# Patient Record
Sex: FEMALE | Race: BLACK OR AFRICAN AMERICAN | Employment: UNEMPLOYED | URBAN - METROPOLITAN AREA
[De-identification: names, ages, dates, MRNs, and addresses within clinical notes are randomized per-mention and may not be internally consistent; named-entity substitution may affect disease eponyms.]

---

## 2022-01-01 ENCOUNTER — APPOINTMENT (OUTPATIENT)
Dept: GENERAL RADIOLOGY | Age: 47
DRG: 720 | End: 2022-01-01
Payer: MEDICARE

## 2022-01-01 ENCOUNTER — APPOINTMENT (OUTPATIENT)
Dept: NUCLEAR MEDICINE | Age: 47
DRG: 720 | End: 2022-01-01
Payer: MEDICARE

## 2022-01-01 ENCOUNTER — APPOINTMENT (OUTPATIENT)
Dept: CT IMAGING | Age: 47
DRG: 720 | End: 2022-01-01
Payer: MEDICARE

## 2022-01-01 ENCOUNTER — APPOINTMENT (OUTPATIENT)
Dept: MRI IMAGING | Age: 47
DRG: 720 | End: 2022-01-01
Payer: MEDICARE

## 2022-01-01 ENCOUNTER — HOSPITAL ENCOUNTER (INPATIENT)
Age: 47
LOS: 10 days | DRG: 720 | End: 2022-11-29
Attending: EMERGENCY MEDICINE | Admitting: STUDENT IN AN ORGANIZED HEALTH CARE EDUCATION/TRAINING PROGRAM
Payer: MEDICARE

## 2022-01-01 VITALS
DIASTOLIC BLOOD PRESSURE: 56 MMHG | WEIGHT: 214.73 LBS | HEART RATE: 67 BPM | BODY MASS INDEX: 38.05 KG/M2 | SYSTOLIC BLOOD PRESSURE: 111 MMHG | HEIGHT: 63 IN | RESPIRATION RATE: 20 BRPM | TEMPERATURE: 97.5 F | OXYGEN SATURATION: 69 %

## 2022-01-01 DIAGNOSIS — R94.31 ABNORMAL EKG: ICD-10-CM

## 2022-01-01 DIAGNOSIS — A41.9 SEPTICEMIA (HCC): ICD-10-CM

## 2022-01-01 DIAGNOSIS — G93.1 ANOXIA OF BRAIN (HCC): Primary | ICD-10-CM

## 2022-01-01 DIAGNOSIS — J69.0 ASPIRATION PNEUMONIA, UNSPECIFIED ASPIRATION PNEUMONIA TYPE, UNSPECIFIED LATERALITY, UNSPECIFIED PART OF LUNG (HCC): ICD-10-CM

## 2022-01-01 DIAGNOSIS — A41.9 SEPTIC SHOCK (HCC): ICD-10-CM

## 2022-01-01 DIAGNOSIS — I46.9 CARDIAC ARREST (HCC): ICD-10-CM

## 2022-01-01 DIAGNOSIS — R65.21 SEPTIC SHOCK (HCC): ICD-10-CM

## 2022-01-01 LAB
A/G RATIO: 1.2 (ref 1.1–2.2)
ALBUMIN SERPL-MCNC: 3 G/DL (ref 3.4–5)
ALBUMIN SERPL-MCNC: 3.3 G/DL (ref 3.4–5)
ALP BLD-CCNC: 79 U/L (ref 40–129)
ALP BLD-CCNC: 79 U/L (ref 40–129)
ALT SERPL-CCNC: 17 U/L (ref 10–40)
ALT SERPL-CCNC: 23 U/L (ref 10–40)
AMPHETAMINE SCREEN, URINE: NORMAL
ANION GAP SERPL CALCULATED.3IONS-SCNC: 10 MMOL/L (ref 3–16)
ANION GAP SERPL CALCULATED.3IONS-SCNC: 10 MMOL/L (ref 3–16)
ANION GAP SERPL CALCULATED.3IONS-SCNC: 11 MMOL/L (ref 3–16)
ANION GAP SERPL CALCULATED.3IONS-SCNC: 12 MMOL/L (ref 3–16)
ANION GAP SERPL CALCULATED.3IONS-SCNC: 12 MMOL/L (ref 3–16)
ANION GAP SERPL CALCULATED.3IONS-SCNC: 13 MMOL/L (ref 3–16)
ANION GAP SERPL CALCULATED.3IONS-SCNC: 13 MMOL/L (ref 3–16)
ANION GAP SERPL CALCULATED.3IONS-SCNC: 14 MMOL/L (ref 3–16)
ANION GAP SERPL CALCULATED.3IONS-SCNC: 14 MMOL/L (ref 3–16)
ANION GAP SERPL CALCULATED.3IONS-SCNC: 22 MMOL/L (ref 3–16)
ANION GAP SERPL CALCULATED.3IONS-SCNC: 7 MMOL/L (ref 3–16)
ANISOCYTOSIS: ABNORMAL
APTT: 104.9 SEC (ref 23–34.3)
APTT: 112.1 SEC (ref 23–34.3)
APTT: 71.4 SEC (ref 23–34.3)
APTT: 72.8 SEC (ref 23–34.3)
AST SERPL-CCNC: 22 U/L (ref 15–37)
AST SERPL-CCNC: 34 U/L (ref 15–37)
BACTERIA: ABNORMAL /HPF
BANDED NEUTROPHILS RELATIVE PERCENT: 2 % (ref 0–7)
BANDED NEUTROPHILS RELATIVE PERCENT: 2 % (ref 0–7)
BANDED NEUTROPHILS RELATIVE PERCENT: 5 % (ref 0–7)
BANDED NEUTROPHILS RELATIVE PERCENT: 6 % (ref 0–7)
BARBITURATE SCREEN URINE: NORMAL
BASE EXCESS ARTERIAL: -10.8 MMOL/L (ref -3–3)
BASE EXCESS ARTERIAL: -11.5 MMOL/L (ref -3–3)
BASE EXCESS ARTERIAL: -12 MMOL/L (ref -3–3)
BASE EXCESS ARTERIAL: -2.4 MMOL/L (ref -3–3)
BASE EXCESS ARTERIAL: -5.1 MMOL/L (ref -3–3)
BASE EXCESS ARTERIAL: 0 MMOL/L (ref -3–3)
BASE EXCESS ARTERIAL: 10.4 MMOL/L (ref -3–3)
BASE EXCESS ARTERIAL: 10.7 MMOL/L (ref -3–3)
BASE EXCESS ARTERIAL: 11.3 MMOL/L (ref -3–3)
BASE EXCESS ARTERIAL: 2 (ref -3–3)
BASE EXCESS ARTERIAL: 2.9 MMOL/L (ref -3–3)
BASE EXCESS ARTERIAL: 3 (ref -3–3)
BASE EXCESS ARTERIAL: 5.7 MMOL/L (ref -3–3)
BASE EXCESS ARTERIAL: 5.8 MMOL/L (ref -3–3)
BASE EXCESS ARTERIAL: 5.8 MMOL/L (ref -3–3)
BASE EXCESS ARTERIAL: 6.3 MMOL/L (ref -3–3)
BASE EXCESS ARTERIAL: 6.4 MMOL/L (ref -3–3)
BASE EXCESS ARTERIAL: 7 MMOL/L (ref -3–3)
BASE EXCESS ARTERIAL: 8 (ref -3–3)
BASE EXCESS ARTERIAL: 8.1 MMOL/L (ref -3–3)
BASE EXCESS ARTERIAL: 8.1 MMOL/L (ref -3–3)
BASE EXCESS ARTERIAL: 8.4 MMOL/L (ref -3–3)
BASOPHILS ABSOLUTE: 0 K/UL (ref 0–0.2)
BASOPHILS ABSOLUTE: 0.1 K/UL (ref 0–0.2)
BASOPHILS ABSOLUTE: 0.1 K/UL (ref 0–0.2)
BASOPHILS RELATIVE PERCENT: 0 %
BASOPHILS RELATIVE PERCENT: 0.1 %
BASOPHILS RELATIVE PERCENT: 0.3 %
BASOPHILS RELATIVE PERCENT: 0.3 %
BASOPHILS RELATIVE PERCENT: 0.4 %
BASOPHILS RELATIVE PERCENT: 0.6 %
BASOPHILS RELATIVE PERCENT: 1.1 %
BENZODIAZEPINE SCREEN, URINE: NORMAL
BILIRUB SERPL-MCNC: 0.5 MG/DL (ref 0–1)
BILIRUB SERPL-MCNC: <0.2 MG/DL (ref 0–1)
BILIRUBIN DIRECT: <0.2 MG/DL (ref 0–0.3)
BILIRUBIN URINE: NEGATIVE
BILIRUBIN, INDIRECT: ABNORMAL MG/DL (ref 0–1)
BLOOD CULTURE, ROUTINE: NORMAL
BLOOD CULTURE, ROUTINE: NORMAL
BLOOD, URINE: ABNORMAL
BUN BLDV-MCNC: 10 MG/DL (ref 7–20)
BUN BLDV-MCNC: 13 MG/DL (ref 7–20)
BUN BLDV-MCNC: 14 MG/DL (ref 7–20)
BUN BLDV-MCNC: 15 MG/DL (ref 7–20)
BUN BLDV-MCNC: 15 MG/DL (ref 7–20)
BUN BLDV-MCNC: 17 MG/DL (ref 7–20)
BUN BLDV-MCNC: 18 MG/DL (ref 7–20)
BUN BLDV-MCNC: 18 MG/DL (ref 7–20)
BUN BLDV-MCNC: 20 MG/DL (ref 7–20)
BUN BLDV-MCNC: 22 MG/DL (ref 7–20)
BUN BLDV-MCNC: 23 MG/DL (ref 7–20)
BUN BLDV-MCNC: 23 MG/DL (ref 7–20)
BUN BLDV-MCNC: 30 MG/DL (ref 7–20)
BUN BLDV-MCNC: 8 MG/DL (ref 7–20)
BUN BLDV-MCNC: 9 MG/DL (ref 7–20)
CALCIUM IONIZED: 0.99 MMOL/L (ref 1.12–1.32)
CALCIUM IONIZED: 1.01 MMOL/L (ref 1.12–1.32)
CALCIUM IONIZED: 1.01 MMOL/L (ref 1.12–1.32)
CALCIUM IONIZED: 1.03 MMOL/L (ref 1.12–1.32)
CALCIUM IONIZED: 1.05 MMOL/L (ref 1.12–1.32)
CALCIUM IONIZED: 1.05 MMOL/L (ref 1.12–1.32)
CALCIUM IONIZED: 1.06 MMOL/L (ref 1.12–1.32)
CALCIUM IONIZED: 1.07 MMOL/L (ref 1.12–1.32)
CALCIUM IONIZED: 1.09 MMOL/L (ref 1.12–1.32)
CALCIUM SERPL-MCNC: 7 MG/DL (ref 8.3–10.6)
CALCIUM SERPL-MCNC: 7 MG/DL (ref 8.3–10.6)
CALCIUM SERPL-MCNC: 7.1 MG/DL (ref 8.3–10.6)
CALCIUM SERPL-MCNC: 7.2 MG/DL (ref 8.3–10.6)
CALCIUM SERPL-MCNC: 7.2 MG/DL (ref 8.3–10.6)
CALCIUM SERPL-MCNC: 7.4 MG/DL (ref 8.3–10.6)
CALCIUM SERPL-MCNC: 7.7 MG/DL (ref 8.3–10.6)
CALCIUM SERPL-MCNC: 7.9 MG/DL (ref 8.3–10.6)
CALCIUM SERPL-MCNC: 7.9 MG/DL (ref 8.3–10.6)
CALCIUM SERPL-MCNC: 8.5 MG/DL (ref 8.3–10.6)
CALCIUM SERPL-MCNC: 8.6 MG/DL (ref 8.3–10.6)
CALCIUM SERPL-MCNC: 8.8 MG/DL (ref 8.3–10.6)
CANNABINOID SCREEN URINE: NORMAL
CARBOXYHEMOGLOBIN ARTERIAL: 0.4 % (ref 0–1.5)
CARBOXYHEMOGLOBIN ARTERIAL: 0.4 % (ref 0–1.5)
CARBOXYHEMOGLOBIN ARTERIAL: 0.5 % (ref 0–1.5)
CARBOXYHEMOGLOBIN ARTERIAL: 0.6 % (ref 0–1.5)
CARBOXYHEMOGLOBIN ARTERIAL: 0.7 % (ref 0–1.5)
CARBOXYHEMOGLOBIN ARTERIAL: 0.7 % (ref 0–1.5)
CARBOXYHEMOGLOBIN ARTERIAL: 0.8 % (ref 0–1.5)
CARBOXYHEMOGLOBIN ARTERIAL: 1 % (ref 0–1.5)
CARBOXYHEMOGLOBIN ARTERIAL: 1.1 % (ref 0–1.5)
CARBOXYHEMOGLOBIN ARTERIAL: 1.2 % (ref 0–1.5)
CARBOXYHEMOGLOBIN ARTERIAL: 1.3 % (ref 0–1.5)
CARBOXYHEMOGLOBIN ARTERIAL: 1.3 % (ref 0–1.5)
CARBOXYHEMOGLOBIN ARTERIAL: 1.4 % (ref 0–1.5)
CARBOXYHEMOGLOBIN ARTERIAL: 1.4 % (ref 0–1.5)
CARBOXYHEMOGLOBIN ARTERIAL: 1.5 % (ref 0–1.5)
CARBOXYHEMOGLOBIN ARTERIAL: 1.9 % (ref 0–1.5)
CHLORIDE BLD-SCNC: 103 MMOL/L (ref 99–110)
CHLORIDE BLD-SCNC: 104 MMOL/L (ref 99–110)
CHLORIDE BLD-SCNC: 104 MMOL/L (ref 99–110)
CHLORIDE BLD-SCNC: 105 MMOL/L (ref 99–110)
CHLORIDE BLD-SCNC: 107 MMOL/L (ref 99–110)
CHLORIDE BLD-SCNC: 109 MMOL/L (ref 99–110)
CHLORIDE BLD-SCNC: 109 MMOL/L (ref 99–110)
CHLORIDE BLD-SCNC: 111 MMOL/L (ref 99–110)
CHLORIDE BLD-SCNC: 112 MMOL/L (ref 99–110)
CHLORIDE BLD-SCNC: 113 MMOL/L (ref 99–110)
CHLORIDE BLD-SCNC: 118 MMOL/L (ref 99–110)
CHLORIDE BLD-SCNC: 119 MMOL/L (ref 99–110)
CHLORIDE BLD-SCNC: 119 MMOL/L (ref 99–110)
CHLORIDE BLD-SCNC: 121 MMOL/L (ref 99–110)
CHLORIDE BLD-SCNC: 122 MMOL/L (ref 99–110)
CHLORIDE BLD-SCNC: 123 MMOL/L (ref 99–110)
CHLORIDE BLD-SCNC: 124 MMOL/L (ref 99–110)
CLARITY: ABNORMAL
CO2: 16 MMOL/L (ref 21–32)
CO2: 17 MMOL/L (ref 21–32)
CO2: 17 MMOL/L (ref 21–32)
CO2: 20 MMOL/L (ref 21–32)
CO2: 21 MMOL/L (ref 21–32)
CO2: 22 MMOL/L (ref 21–32)
CO2: 24 MMOL/L (ref 21–32)
CO2: 25 MMOL/L (ref 21–32)
CO2: 26 MMOL/L (ref 21–32)
CO2: 27 MMOL/L (ref 21–32)
CO2: 27 MMOL/L (ref 21–32)
CO2: 28 MMOL/L (ref 21–32)
CO2: 29 MMOL/L (ref 21–32)
CO2: 30 MMOL/L (ref 21–32)
CO2: 31 MMOL/L (ref 21–32)
CO2: 31 MMOL/L (ref 21–32)
CO2: 33 MMOL/L (ref 21–32)
COCAINE METABOLITE SCREEN URINE: NORMAL
COLOR: YELLOW
COMMENT UA: ABNORMAL
CREAT SERPL-MCNC: 0.5 MG/DL (ref 0.6–1.1)
CREAT SERPL-MCNC: 0.6 MG/DL (ref 0.6–1.1)
CREAT SERPL-MCNC: 0.7 MG/DL (ref 0.6–1.1)
CREAT SERPL-MCNC: 0.8 MG/DL (ref 0.6–1.1)
CREAT SERPL-MCNC: 0.9 MG/DL (ref 0.6–1.1)
CREAT SERPL-MCNC: <0.5 MG/DL (ref 0.6–1.1)
CREAT SERPL-MCNC: <0.5 MG/DL (ref 0.6–1.1)
CRYSTALS, UA: ABNORMAL /HPF
CULTURE, RESPIRATORY: NORMAL
EKG ATRIAL RATE: 159 BPM
EKG ATRIAL RATE: 84 BPM
EKG ATRIAL RATE: 93 BPM
EKG DIAGNOSIS: NORMAL
EKG P AXIS: 35 DEGREES
EKG P AXIS: 54 DEGREES
EKG P AXIS: 59 DEGREES
EKG P-R INTERVAL: 114 MS
EKG P-R INTERVAL: 134 MS
EKG P-R INTERVAL: 180 MS
EKG Q-T INTERVAL: 314 MS
EKG Q-T INTERVAL: 386 MS
EKG Q-T INTERVAL: 448 MS
EKG QRS DURATION: 86 MS
EKG QRS DURATION: 88 MS
EKG QRS DURATION: 92 MS
EKG QTC CALCULATION (BAZETT): 479 MS
EKG QTC CALCULATION (BAZETT): 497 MS
EKG QTC CALCULATION (BAZETT): 529 MS
EKG R AXIS: 57 DEGREES
EKG R AXIS: 64 DEGREES
EKG R AXIS: 75 DEGREES
EKG T AXIS: -32 DEGREES
EKG T AXIS: -82 DEGREES
EKG T AXIS: 37 DEGREES
EKG VENTRICULAR RATE: 151 BPM
EKG VENTRICULAR RATE: 84 BPM
EKG VENTRICULAR RATE: 93 BPM
EOSINOPHILS ABSOLUTE: 0 K/UL (ref 0–0.6)
EOSINOPHILS ABSOLUTE: 0.1 K/UL (ref 0–0.6)
EOSINOPHILS ABSOLUTE: 0.2 K/UL (ref 0–0.6)
EOSINOPHILS ABSOLUTE: 0.2 K/UL (ref 0–0.6)
EOSINOPHILS ABSOLUTE: 0.3 K/UL (ref 0–0.6)
EOSINOPHILS RELATIVE PERCENT: 0 %
EOSINOPHILS RELATIVE PERCENT: 0.2 %
EOSINOPHILS RELATIVE PERCENT: 1.9 %
EOSINOPHILS RELATIVE PERCENT: 2 %
EOSINOPHILS RELATIVE PERCENT: 2.1 %
EOSINOPHILS RELATIVE PERCENT: 2.3 %
EPITHELIAL CELLS, UA: 13 /HPF (ref 0–5)
ESTIMATED AVERAGE GLUCOSE: 125.5 MG/DL
FENTANYL SCREEN, URINE: NORMAL
FUNGUS (MYCOLOGY) CULTURE: NORMAL
FUNGUS STAIN: NORMAL
GFR SERPL CREATININE-BSD FRML MDRD: >60 ML/MIN/{1.73_M2}
GLUCOSE BLD-MCNC: 114 MG/DL (ref 70–99)
GLUCOSE BLD-MCNC: 124 MG/DL (ref 70–99)
GLUCOSE BLD-MCNC: 125 MG/DL (ref 70–99)
GLUCOSE BLD-MCNC: 129 MG/DL (ref 70–99)
GLUCOSE BLD-MCNC: 131 MG/DL (ref 70–99)
GLUCOSE BLD-MCNC: 132 MG/DL (ref 70–99)
GLUCOSE BLD-MCNC: 132 MG/DL (ref 70–99)
GLUCOSE BLD-MCNC: 135 MG/DL (ref 70–99)
GLUCOSE BLD-MCNC: 136 MG/DL (ref 70–99)
GLUCOSE BLD-MCNC: 136 MG/DL (ref 70–99)
GLUCOSE BLD-MCNC: 138 MG/DL (ref 70–99)
GLUCOSE BLD-MCNC: 138 MG/DL (ref 70–99)
GLUCOSE BLD-MCNC: 139 MG/DL (ref 70–99)
GLUCOSE BLD-MCNC: 139 MG/DL (ref 70–99)
GLUCOSE BLD-MCNC: 140 MG/DL (ref 70–99)
GLUCOSE BLD-MCNC: 142 MG/DL (ref 70–99)
GLUCOSE BLD-MCNC: 144 MG/DL (ref 70–99)
GLUCOSE BLD-MCNC: 145 MG/DL (ref 70–99)
GLUCOSE BLD-MCNC: 146 MG/DL (ref 70–99)
GLUCOSE BLD-MCNC: 147 MG/DL (ref 70–99)
GLUCOSE BLD-MCNC: 151 MG/DL (ref 70–99)
GLUCOSE BLD-MCNC: 152 MG/DL (ref 70–99)
GLUCOSE BLD-MCNC: 152 MG/DL (ref 70–99)
GLUCOSE BLD-MCNC: 153 MG/DL (ref 70–99)
GLUCOSE BLD-MCNC: 154 MG/DL (ref 70–99)
GLUCOSE BLD-MCNC: 155 MG/DL (ref 70–99)
GLUCOSE BLD-MCNC: 156 MG/DL (ref 70–99)
GLUCOSE BLD-MCNC: 157 MG/DL (ref 70–99)
GLUCOSE BLD-MCNC: 158 MG/DL (ref 70–99)
GLUCOSE BLD-MCNC: 158 MG/DL (ref 70–99)
GLUCOSE BLD-MCNC: 159 MG/DL (ref 70–99)
GLUCOSE BLD-MCNC: 159 MG/DL (ref 70–99)
GLUCOSE BLD-MCNC: 160 MG/DL (ref 70–99)
GLUCOSE BLD-MCNC: 161 MG/DL (ref 70–99)
GLUCOSE BLD-MCNC: 162 MG/DL (ref 70–99)
GLUCOSE BLD-MCNC: 164 MG/DL (ref 70–99)
GLUCOSE BLD-MCNC: 169 MG/DL (ref 70–99)
GLUCOSE BLD-MCNC: 170 MG/DL (ref 70–99)
GLUCOSE BLD-MCNC: 172 MG/DL (ref 70–99)
GLUCOSE BLD-MCNC: 173 MG/DL (ref 70–99)
GLUCOSE BLD-MCNC: 174 MG/DL (ref 70–99)
GLUCOSE BLD-MCNC: 177 MG/DL (ref 70–99)
GLUCOSE BLD-MCNC: 181 MG/DL (ref 70–99)
GLUCOSE BLD-MCNC: 187 MG/DL (ref 70–99)
GLUCOSE BLD-MCNC: 189 MG/DL (ref 70–99)
GLUCOSE BLD-MCNC: 191 MG/DL (ref 70–99)
GLUCOSE BLD-MCNC: 191 MG/DL (ref 70–99)
GLUCOSE BLD-MCNC: 193 MG/DL (ref 70–99)
GLUCOSE BLD-MCNC: 193 MG/DL (ref 70–99)
GLUCOSE BLD-MCNC: 194 MG/DL (ref 70–99)
GLUCOSE BLD-MCNC: 196 MG/DL (ref 70–99)
GLUCOSE BLD-MCNC: 197 MG/DL (ref 70–99)
GLUCOSE BLD-MCNC: 198 MG/DL (ref 70–99)
GLUCOSE BLD-MCNC: 199 MG/DL (ref 70–99)
GLUCOSE BLD-MCNC: 200 MG/DL (ref 70–99)
GLUCOSE BLD-MCNC: 207 MG/DL (ref 70–99)
GLUCOSE BLD-MCNC: 208 MG/DL (ref 70–99)
GLUCOSE BLD-MCNC: 213 MG/DL (ref 70–99)
GLUCOSE BLD-MCNC: 214 MG/DL (ref 70–99)
GLUCOSE BLD-MCNC: 216 MG/DL (ref 70–99)
GLUCOSE BLD-MCNC: 220 MG/DL (ref 70–99)
GLUCOSE BLD-MCNC: 227 MG/DL (ref 70–99)
GLUCOSE BLD-MCNC: 227 MG/DL (ref 70–99)
GLUCOSE BLD-MCNC: 230 MG/DL (ref 70–99)
GLUCOSE BLD-MCNC: 231 MG/DL (ref 70–99)
GLUCOSE BLD-MCNC: 231 MG/DL (ref 70–99)
GLUCOSE BLD-MCNC: 232 MG/DL (ref 70–99)
GLUCOSE BLD-MCNC: 233 MG/DL (ref 70–99)
GLUCOSE BLD-MCNC: 234 MG/DL (ref 70–99)
GLUCOSE BLD-MCNC: 236 MG/DL (ref 70–99)
GLUCOSE BLD-MCNC: 237 MG/DL (ref 70–99)
GLUCOSE BLD-MCNC: 240 MG/DL (ref 70–99)
GLUCOSE BLD-MCNC: 243 MG/DL (ref 70–99)
GLUCOSE BLD-MCNC: 247 MG/DL (ref 70–99)
GLUCOSE BLD-MCNC: 252 MG/DL (ref 70–99)
GLUCOSE BLD-MCNC: 256 MG/DL (ref 70–99)
GLUCOSE BLD-MCNC: 260 MG/DL (ref 70–99)
GLUCOSE BLD-MCNC: 261 MG/DL (ref 70–99)
GLUCOSE BLD-MCNC: 261 MG/DL (ref 70–99)
GLUCOSE BLD-MCNC: 262 MG/DL (ref 70–99)
GLUCOSE BLD-MCNC: 263 MG/DL (ref 70–99)
GLUCOSE BLD-MCNC: 265 MG/DL (ref 70–99)
GLUCOSE BLD-MCNC: 268 MG/DL (ref 70–99)
GLUCOSE BLD-MCNC: 270 MG/DL (ref 70–99)
GLUCOSE BLD-MCNC: 275 MG/DL (ref 70–99)
GLUCOSE BLD-MCNC: 280 MG/DL (ref 70–99)
GLUCOSE BLD-MCNC: 286 MG/DL (ref 70–99)
GLUCOSE BLD-MCNC: 290 MG/DL (ref 70–99)
GLUCOSE BLD-MCNC: 304 MG/DL (ref 70–99)
GLUCOSE BLD-MCNC: 307 MG/DL (ref 70–99)
GLUCOSE BLD-MCNC: 308 MG/DL (ref 70–99)
GLUCOSE BLD-MCNC: 313 MG/DL (ref 70–99)
GLUCOSE BLD-MCNC: 320 MG/DL (ref 70–99)
GLUCOSE BLD-MCNC: 322 MG/DL (ref 70–99)
GLUCOSE BLD-MCNC: 331 MG/DL (ref 70–99)
GLUCOSE BLD-MCNC: 333 MG/DL (ref 70–99)
GLUCOSE BLD-MCNC: 336 MG/DL (ref 70–99)
GLUCOSE BLD-MCNC: 338 MG/DL (ref 70–99)
GLUCOSE BLD-MCNC: 339 MG/DL (ref 70–99)
GLUCOSE BLD-MCNC: 344 MG/DL (ref 70–99)
GLUCOSE BLD-MCNC: 350 MG/DL (ref 70–99)
GLUCOSE BLD-MCNC: 351 MG/DL (ref 70–99)
GLUCOSE BLD-MCNC: 356 MG/DL (ref 70–99)
GLUCOSE BLD-MCNC: 374 MG/DL (ref 70–99)
GLUCOSE BLD-MCNC: 378 MG/DL (ref 70–99)
GLUCOSE URINE: NEGATIVE MG/DL
GRAM STAIN RESULT: NORMAL
HBA1C MFR BLD: 6 %
HCO3 ARTERIAL: 18.1 MMOL/L (ref 21–29)
HCO3 ARTERIAL: 18.5 MMOL/L (ref 21–29)
HCO3 ARTERIAL: 18.7 MMOL/L (ref 21–29)
HCO3 ARTERIAL: 24.3 MMOL/L (ref 21–29)
HCO3 ARTERIAL: 26.4 MMOL/L (ref 21–29)
HCO3 ARTERIAL: 27.9 MMOL/L (ref 21–29)
HCO3 ARTERIAL: 29.6 MMOL/L (ref 21–29)
HCO3 ARTERIAL: 29.6 MMOL/L (ref 21–29)
HCO3 ARTERIAL: 30 MMOL/L (ref 21–29)
HCO3 ARTERIAL: 31 MMOL/L (ref 21–29)
HCO3 ARTERIAL: 31.1 MMOL/L (ref 21–29)
HCO3 ARTERIAL: 31.8 MMOL/L (ref 21–29)
HCO3 ARTERIAL: 32 MMOL/L (ref 21–29)
HCO3 ARTERIAL: 32.7 MMOL/L (ref 21–29)
HCO3 ARTERIAL: 33 MMOL/L (ref 21–29)
HCO3 ARTERIAL: 33.1 MMOL/L (ref 21–29)
HCO3 ARTERIAL: 33.3 MMOL/L (ref 21–29)
HCO3 ARTERIAL: 33.5 MMOL/L (ref 21–29)
HCO3 ARTERIAL: 33.8 MMOL/L (ref 21–29)
HCO3 ARTERIAL: 34 MMOL/L (ref 21–29)
HCO3 ARTERIAL: 34.2 MMOL/L (ref 21–29)
HCO3 ARTERIAL: 34.8 MMOL/L (ref 21–29)
HCO3 ARTERIAL: 35.2 MMOL/L (ref 21–29)
HCO3 ARTERIAL: 36 MMOL/L (ref 21–29)
HCT VFR BLD CALC: 24 % (ref 36–48)
HCT VFR BLD CALC: 25.2 % (ref 36–48)
HCT VFR BLD CALC: 26.3 % (ref 36–48)
HCT VFR BLD CALC: 27.4 % (ref 36–48)
HCT VFR BLD CALC: 27.6 % (ref 36–48)
HCT VFR BLD CALC: 27.9 % (ref 36–48)
HCT VFR BLD CALC: 30.8 % (ref 36–48)
HCT VFR BLD CALC: 32.9 % (ref 36–48)
HCT VFR BLD CALC: 33 % (ref 36–48)
HCT VFR BLD CALC: 39.8 % (ref 36–48)
HCT VFR BLD CALC: 40.9 % (ref 36–48)
HEMATOLOGY PATH CONSULT: NO
HEMOGLOBIN, ART, EXTENDED: 10 G/DL (ref 12–16)
HEMOGLOBIN, ART, EXTENDED: 10.2 G/DL (ref 12–16)
HEMOGLOBIN, ART, EXTENDED: 10.3 G/DL (ref 12–16)
HEMOGLOBIN, ART, EXTENDED: 10.7 G/DL (ref 12–16)
HEMOGLOBIN, ART, EXTENDED: 11.1 G/DL (ref 12–16)
HEMOGLOBIN, ART, EXTENDED: 12.3 G/DL (ref 12–16)
HEMOGLOBIN, ART, EXTENDED: 12.4 G/DL (ref 12–16)
HEMOGLOBIN, ART, EXTENDED: 12.7 G/DL (ref 12–16)
HEMOGLOBIN, ART, EXTENDED: 12.8 G/DL (ref 12–16)
HEMOGLOBIN, ART, EXTENDED: 12.9 G/DL (ref 12–16)
HEMOGLOBIN, ART, EXTENDED: 12.9 G/DL (ref 12–16)
HEMOGLOBIN, ART, EXTENDED: 13.2 G/DL (ref 12–16)
HEMOGLOBIN, ART, EXTENDED: 13.5 G/DL (ref 12–16)
HEMOGLOBIN, ART, EXTENDED: 14.5 G/DL (ref 12–16)
HEMOGLOBIN, ART, EXTENDED: 16.8 G/DL (ref 12–16)
HEMOGLOBIN, ART, EXTENDED: 8.9 G/DL (ref 12–16)
HEMOGLOBIN, ART, EXTENDED: 9.9 G/DL (ref 12–16)
HEMOGLOBIN: 10.4 G/DL (ref 12–16)
HEMOGLOBIN: 10.4 G/DL (ref 12–16)
HEMOGLOBIN: 12.2 G/DL (ref 12–16)
HEMOGLOBIN: 12.5 G/DL (ref 12–16)
HEMOGLOBIN: 7.3 G/DL (ref 12–16)
HEMOGLOBIN: 7.5 G/DL (ref 12–16)
HEMOGLOBIN: 7.9 G/DL (ref 12–16)
HEMOGLOBIN: 8.5 G/DL (ref 12–16)
HEMOGLOBIN: 8.5 G/DL (ref 12–16)
HEMOGLOBIN: 9.1 G/DL (ref 12–16)
HEMOGLOBIN: 9.8 G/DL (ref 12–16)
HYALINE CASTS: 4 /LPF (ref 0–8)
HYPOCHROMIA: ABNORMAL
INR BLD: 1.39 (ref 0.87–1.14)
KETONES, URINE: 15 MG/DL
LACTIC ACID: 1.5 MMOL/L (ref 0.4–2)
LACTIC ACID: 1.7 MMOL/L (ref 0.4–2)
LACTIC ACID: 1.7 MMOL/L (ref 0.4–2)
LACTIC ACID: 2 MMOL/L (ref 0.4–2)
LACTIC ACID: 2.1 MMOL/L (ref 0.4–2)
LACTIC ACID: 2.2 MMOL/L (ref 0.4–2)
LACTIC ACID: 2.5 MMOL/L (ref 0.4–2)
LACTIC ACID: 2.6 MMOL/L (ref 0.4–2)
LACTIC ACID: 2.9 MMOL/L (ref 0.4–2)
LACTIC ACID: 3.2 MMOL/L (ref 0.4–2)
LACTIC ACID: 7.3 MMOL/L (ref 0.4–2)
LEUKOCYTE ESTERASE, URINE: NEGATIVE
LIPASE: 14 U/L (ref 13–60)
LV EF: 55 %
LVEF MODALITY: NORMAL
LYMPHOCYTES ABSOLUTE: 0.4 K/UL (ref 1–5.1)
LYMPHOCYTES ABSOLUTE: 0.4 K/UL (ref 1–5.1)
LYMPHOCYTES ABSOLUTE: 0.5 K/UL (ref 1–5.1)
LYMPHOCYTES ABSOLUTE: 0.6 K/UL (ref 1–5.1)
LYMPHOCYTES ABSOLUTE: 0.7 K/UL (ref 1–5.1)
LYMPHOCYTES ABSOLUTE: 0.9 K/UL (ref 1–5.1)
LYMPHOCYTES ABSOLUTE: 1.1 K/UL (ref 1–5.1)
LYMPHOCYTES ABSOLUTE: 1.3 K/UL (ref 1–5.1)
LYMPHOCYTES ABSOLUTE: 3.6 K/UL (ref 1–5.1)
LYMPHOCYTES RELATIVE PERCENT: 11.9 %
LYMPHOCYTES RELATIVE PERCENT: 14.1 %
LYMPHOCYTES RELATIVE PERCENT: 18 %
LYMPHOCYTES RELATIVE PERCENT: 2.9 %
LYMPHOCYTES RELATIVE PERCENT: 25.8 %
LYMPHOCYTES RELATIVE PERCENT: 3 %
LYMPHOCYTES RELATIVE PERCENT: 3.3 %
LYMPHOCYTES RELATIVE PERCENT: 6 %
LYMPHOCYTES RELATIVE PERCENT: 7.4 %
LYMPHOCYTES RELATIVE PERCENT: 9 %
LYMPHOCYTES RELATIVE PERCENT: 9.5 %
Lab: NORMAL
MACROCYTES: ABNORMAL
MACROCYTES: ABNORMAL
MAGNESIUM: 1.8 MG/DL (ref 1.8–2.4)
MAGNESIUM: 1.9 MG/DL (ref 1.8–2.4)
MAGNESIUM: 2 MG/DL (ref 1.8–2.4)
MAGNESIUM: 2 MG/DL (ref 1.8–2.4)
MAGNESIUM: 2.2 MG/DL (ref 1.8–2.4)
MAGNESIUM: 2.3 MG/DL (ref 1.8–2.4)
MAGNESIUM: 2.3 MG/DL (ref 1.8–2.4)
MAGNESIUM: 2.4 MG/DL (ref 1.8–2.4)
MAGNESIUM: 2.7 MG/DL (ref 1.8–2.4)
MAGNESIUM: 2.9 MG/DL (ref 1.8–2.4)
MAGNESIUM: 3 MG/DL (ref 1.8–2.4)
MCH RBC QN AUTO: 23.4 PG (ref 26–34)
MCH RBC QN AUTO: 23.4 PG (ref 26–34)
MCH RBC QN AUTO: 23.8 PG (ref 26–34)
MCH RBC QN AUTO: 24 PG (ref 26–34)
MCH RBC QN AUTO: 24.2 PG (ref 26–34)
MCH RBC QN AUTO: 24.2 PG (ref 26–34)
MCH RBC QN AUTO: 24.3 PG (ref 26–34)
MCH RBC QN AUTO: 24.3 PG (ref 26–34)
MCHC RBC AUTO-ENTMCNC: 29.7 G/DL (ref 31–36)
MCHC RBC AUTO-ENTMCNC: 29.8 G/DL (ref 31–36)
MCHC RBC AUTO-ENTMCNC: 30.1 G/DL (ref 31–36)
MCHC RBC AUTO-ENTMCNC: 30.5 G/DL (ref 31–36)
MCHC RBC AUTO-ENTMCNC: 30.8 G/DL (ref 31–36)
MCHC RBC AUTO-ENTMCNC: 30.9 G/DL (ref 31–36)
MCHC RBC AUTO-ENTMCNC: 31.5 G/DL (ref 31–36)
MCHC RBC AUTO-ENTMCNC: 31.5 G/DL (ref 31–36)
MCHC RBC AUTO-ENTMCNC: 31.7 G/DL (ref 31–36)
MCHC RBC AUTO-ENTMCNC: 32 G/DL (ref 31–36)
MCHC RBC AUTO-ENTMCNC: 32.4 G/DL (ref 31–36)
MCV RBC AUTO: 74.9 FL (ref 80–100)
MCV RBC AUTO: 75 FL (ref 80–100)
MCV RBC AUTO: 76.3 FL (ref 80–100)
MCV RBC AUTO: 76.4 FL (ref 80–100)
MCV RBC AUTO: 77.2 FL (ref 80–100)
MCV RBC AUTO: 77.2 FL (ref 80–100)
MCV RBC AUTO: 77.5 FL (ref 80–100)
MCV RBC AUTO: 77.6 FL (ref 80–100)
MCV RBC AUTO: 78.4 FL (ref 80–100)
MCV RBC AUTO: 78.6 FL (ref 80–100)
MCV RBC AUTO: 81.3 FL (ref 80–100)
METAMYELOCYTES RELATIVE PERCENT: 1 %
METHADONE SCREEN, URINE: NORMAL
METHEMOGLOBIN ARTERIAL: 0.2 %
METHEMOGLOBIN ARTERIAL: 0.4 %
METHEMOGLOBIN ARTERIAL: 0.5 %
METHEMOGLOBIN ARTERIAL: 0.6 %
METHEMOGLOBIN ARTERIAL: 0.7 %
METHEMOGLOBIN ARTERIAL: 0.7 %
METHEMOGLOBIN ARTERIAL: 0.8 %
METHEMOGLOBIN ARTERIAL: 0.8 %
MICROCYTES: ABNORMAL
MICROCYTES: ABNORMAL
MICROSCOPIC EXAMINATION: YES
MONOCYTES ABSOLUTE: 0 K/UL (ref 0–1.3)
MONOCYTES ABSOLUTE: 0.2 K/UL (ref 0–1.3)
MONOCYTES ABSOLUTE: 0.2 K/UL (ref 0–1.3)
MONOCYTES ABSOLUTE: 0.3 K/UL (ref 0–1.3)
MONOCYTES ABSOLUTE: 0.4 K/UL (ref 0–1.3)
MONOCYTES ABSOLUTE: 0.5 K/UL (ref 0–1.3)
MONOCYTES ABSOLUTE: 0.7 K/UL (ref 0–1.3)
MONOCYTES ABSOLUTE: 0.8 K/UL (ref 0–1.3)
MONOCYTES RELATIVE PERCENT: 0 %
MONOCYTES RELATIVE PERCENT: 1.8 %
MONOCYTES RELATIVE PERCENT: 2.6 %
MONOCYTES RELATIVE PERCENT: 2.6 %
MONOCYTES RELATIVE PERCENT: 2.8 %
MONOCYTES RELATIVE PERCENT: 2.9 %
MONOCYTES RELATIVE PERCENT: 3 %
MONOCYTES RELATIVE PERCENT: 3.5 %
MONOCYTES RELATIVE PERCENT: 4 %
MONOCYTES RELATIVE PERCENT: 5 %
MONOCYTES RELATIVE PERCENT: 5.6 %
MRSA SCREEN RT-PCR: NORMAL
MYELOCYTE PERCENT: 1 %
NEUTROPHILS ABSOLUTE: 10 K/UL (ref 1.7–7.7)
NEUTROPHILS ABSOLUTE: 11.3 K/UL (ref 1.7–7.7)
NEUTROPHILS ABSOLUTE: 12.3 K/UL (ref 1.7–7.7)
NEUTROPHILS ABSOLUTE: 17.3 K/UL (ref 1.7–7.7)
NEUTROPHILS ABSOLUTE: 5.8 K/UL (ref 1.7–7.7)
NEUTROPHILS ABSOLUTE: 6.4 K/UL (ref 1.7–7.7)
NEUTROPHILS ABSOLUTE: 7.2 K/UL (ref 1.7–7.7)
NEUTROPHILS ABSOLUTE: 8.9 K/UL (ref 1.7–7.7)
NEUTROPHILS ABSOLUTE: 9.1 K/UL (ref 1.7–7.7)
NEUTROPHILS ABSOLUTE: 9.4 K/UL (ref 1.7–7.7)
NEUTROPHILS ABSOLUTE: 9.8 K/UL (ref 1.7–7.7)
NEUTROPHILS RELATIVE PERCENT: 67.8 %
NEUTROPHILS RELATIVE PERCENT: 74 %
NEUTROPHILS RELATIVE PERCENT: 80.8 %
NEUTROPHILS RELATIVE PERCENT: 82.9 %
NEUTROPHILS RELATIVE PERCENT: 84 %
NEUTROPHILS RELATIVE PERCENT: 84.7 %
NEUTROPHILS RELATIVE PERCENT: 85 %
NEUTROPHILS RELATIVE PERCENT: 88 %
NEUTROPHILS RELATIVE PERCENT: 89.7 %
NEUTROPHILS RELATIVE PERCENT: 93.6 %
NEUTROPHILS RELATIVE PERCENT: 94.5 %
NITRITE, URINE: NEGATIVE
NUCLEATED RED BLOOD CELLS: 1 /100 WBC
NUCLEATED RED BLOOD CELLS: 2 /100 WBC
NUCLEATED RED BLOOD CELLS: 2 /100 WBC
O2 SAT, ARTERIAL: 43 % (ref 93–100)
O2 SAT, ARTERIAL: 44 % (ref 93–100)
O2 SAT, ARTERIAL: 80.5 %
O2 SAT, ARTERIAL: 84.1 %
O2 SAT, ARTERIAL: 84.8 %
O2 SAT, ARTERIAL: 87.7 %
O2 SAT, ARTERIAL: 87.8 %
O2 SAT, ARTERIAL: 88.6 %
O2 SAT, ARTERIAL: 89.5 %
O2 SAT, ARTERIAL: 91.8 %
O2 SAT, ARTERIAL: 92.1 %
O2 SAT, ARTERIAL: 93.2 %
O2 SAT, ARTERIAL: 93.6 %
O2 SAT, ARTERIAL: 94.4 %
O2 SAT, ARTERIAL: 96 % (ref 93–100)
O2 SAT, ARTERIAL: 97.3 %
O2 SAT, ARTERIAL: 97.8 %
O2 SAT, ARTERIAL: 98.3 %
O2 SAT, ARTERIAL: 98.4 %
O2 SAT, ARTERIAL: 98.6 %
O2 SAT, ARTERIAL: 98.9 %
O2 SAT, ARTERIAL: 99.6 %
O2 SAT, ARTERIAL: >100 %
O2 SAT, ARTERIAL: >100 %
O2 THERAPY: ABNORMAL
OPIATE SCREEN URINE: NORMAL
ORGANISM: ABNORMAL
OXYCODONE URINE: NORMAL
PCO2 ARTERIAL: 34.3 MMHG (ref 35–45)
PCO2 ARTERIAL: 38.1 MMHG (ref 35–45)
PCO2 ARTERIAL: 40.3 MMHG (ref 35–45)
PCO2 ARTERIAL: 40.5 MMHG (ref 35–45)
PCO2 ARTERIAL: 40.9 MMHG (ref 35–45)
PCO2 ARTERIAL: 44.2 MMHG (ref 35–45)
PCO2 ARTERIAL: 46.7 MMHG (ref 35–45)
PCO2 ARTERIAL: 50.3 MMHG (ref 35–45)
PCO2 ARTERIAL: 52.4 MMHG (ref 35–45)
PCO2 ARTERIAL: 53.6 MMHG (ref 35–45)
PCO2 ARTERIAL: 53.8 MMHG (ref 35–45)
PCO2 ARTERIAL: 54.5 MM HG (ref 35–45)
PCO2 ARTERIAL: 56.5 MMHG (ref 35–45)
PCO2 ARTERIAL: 56.9 MMHG (ref 35–45)
PCO2 ARTERIAL: 58.2 MMHG (ref 35–45)
PCO2 ARTERIAL: 58.7 MMHG (ref 35–45)
PCO2 ARTERIAL: 59.2 MMHG (ref 35–45)
PCO2 ARTERIAL: 64 MMHG (ref 35–45)
PCO2 ARTERIAL: 64.9 MMHG (ref 35–45)
PCO2 ARTERIAL: 70 MM HG (ref 35–45)
PCO2 ARTERIAL: 74.6 MM HG (ref 35–45)
PCO2 ARTERIAL: 75.3 MMHG (ref 35–45)
PCO2 ARTERIAL: 79.3 MMHG (ref 35–45)
PCO2 ARTERIAL: 84.6 MMHG (ref 35–45)
PDW BLD-RTO: 15.9 % (ref 12.4–15.4)
PDW BLD-RTO: 16.1 % (ref 12.4–15.4)
PDW BLD-RTO: 16.2 % (ref 12.4–15.4)
PDW BLD-RTO: 16.3 % (ref 12.4–15.4)
PDW BLD-RTO: 16.3 % (ref 12.4–15.4)
PDW BLD-RTO: 16.4 % (ref 12.4–15.4)
PDW BLD-RTO: 16.5 % (ref 12.4–15.4)
PDW BLD-RTO: 16.6 % (ref 12.4–15.4)
PDW BLD-RTO: 16.8 % (ref 12.4–15.4)
PDW BLD-RTO: 16.9 % (ref 12.4–15.4)
PDW BLD-RTO: 17.1 % (ref 12.4–15.4)
PERFORMED ON: ABNORMAL
PERFORMED ON: NORMAL
PERFORMED ON: NORMAL
PH ARTERIAL: 7.1 (ref 7.35–7.45)
PH ARTERIAL: 7.1 (ref 7.35–7.45)
PH ARTERIAL: 7.13 (ref 7.35–7.45)
PH ARTERIAL: 7.18 (ref 7.35–7.45)
PH ARTERIAL: 7.22 (ref 7.35–7.45)
PH ARTERIAL: 7.23 (ref 7.35–7.45)
PH ARTERIAL: 7.23 (ref 7.35–7.45)
PH ARTERIAL: 7.24 (ref 7.35–7.45)
PH ARTERIAL: 7.25 (ref 7.35–7.45)
PH ARTERIAL: 7.28 (ref 7.35–7.45)
PH ARTERIAL: 7.3 (ref 7.35–7.45)
PH ARTERIAL: 7.36 (ref 7.35–7.45)
PH ARTERIAL: 7.36 (ref 7.35–7.45)
PH ARTERIAL: 7.38 (ref 7.35–7.45)
PH ARTERIAL: 7.39 (ref 7.35–7.45)
PH ARTERIAL: 7.4 (ref 7.35–7.45)
PH ARTERIAL: 7.44 (ref 7.35–7.45)
PH ARTERIAL: 7.46 (ref 7.35–7.45)
PH ARTERIAL: 7.46 (ref 7.35–7.45)
PH ARTERIAL: 7.5 (ref 7.35–7.45)
PH ARTERIAL: 7.5 (ref 7.35–7.45)
PH ARTERIAL: 7.53 (ref 7.35–7.45)
PH ARTERIAL: 7.54 (ref 7.35–7.45)
PH ARTERIAL: 7.6 (ref 7.35–7.45)
PH UA: 5
PH UA: 5.5 (ref 5–8)
PH VENOUS: 7.1 (ref 7.35–7.45)
PH VENOUS: 7.1 (ref 7.35–7.45)
PH VENOUS: 7.13 (ref 7.35–7.45)
PH VENOUS: 7.18 (ref 7.35–7.45)
PH VENOUS: 7.23 (ref 7.35–7.45)
PH VENOUS: 7.31 (ref 7.35–7.45)
PH VENOUS: 7.37 (ref 7.35–7.45)
PH VENOUS: 7.4 (ref 7.35–7.45)
PH VENOUS: 7.42 (ref 7.35–7.45)
PHENCYCLIDINE SCREEN URINE: NORMAL
PHOSPHORUS: 0.8 MG/DL (ref 2.5–4.9)
PHOSPHORUS: 1.5 MG/DL (ref 2.5–4.9)
PHOSPHORUS: 1.9 MG/DL (ref 2.5–4.9)
PHOSPHORUS: 2.2 MG/DL (ref 2.5–4.9)
PHOSPHORUS: 2.8 MG/DL (ref 2.5–4.9)
PHOSPHORUS: 3.3 MG/DL (ref 2.5–4.9)
PHOSPHORUS: 3.3 MG/DL (ref 2.5–4.9)
PHOSPHORUS: 3.7 MG/DL (ref 2.5–4.9)
PHOSPHORUS: 3.8 MG/DL (ref 2.5–4.9)
PHOSPHORUS: 3.9 MG/DL (ref 2.5–4.9)
PHOSPHORUS: 5.1 MG/DL (ref 2.5–4.9)
PHOSPHORUS: 5.1 MG/DL (ref 2.5–4.9)
PHOSPHORUS: 6.2 MG/DL (ref 2.5–4.9)
PLATELET # BLD: 123 K/UL (ref 135–450)
PLATELET # BLD: 128 K/UL (ref 135–450)
PLATELET # BLD: 136 K/UL (ref 135–450)
PLATELET # BLD: 137 K/UL (ref 135–450)
PLATELET # BLD: 146 K/UL (ref 135–450)
PLATELET # BLD: 161 K/UL (ref 135–450)
PLATELET # BLD: 46 K/UL (ref 135–450)
PLATELET # BLD: 49 K/UL (ref 135–450)
PLATELET # BLD: 53 K/UL (ref 135–450)
PLATELET # BLD: 55 K/UL (ref 135–450)
PLATELET # BLD: 85 K/UL (ref 135–450)
PLATELET SLIDE REVIEW: ABNORMAL
PLATELET SLIDE REVIEW: ADEQUATE
PMV BLD AUTO: 10 FL (ref 5–10.5)
PMV BLD AUTO: 10.2 FL (ref 5–10.5)
PMV BLD AUTO: 10.2 FL (ref 5–10.5)
PMV BLD AUTO: 9 FL (ref 5–10.5)
PMV BLD AUTO: 9.4 FL (ref 5–10.5)
PMV BLD AUTO: 9.5 FL (ref 5–10.5)
PMV BLD AUTO: 9.5 FL (ref 5–10.5)
PMV BLD AUTO: 9.6 FL (ref 5–10.5)
PMV BLD AUTO: 9.8 FL (ref 5–10.5)
PO2 ARTERIAL: 105 MMHG (ref 75–108)
PO2 ARTERIAL: 116 MMHG (ref 75–108)
PO2 ARTERIAL: 120 MMHG (ref 75–108)
PO2 ARTERIAL: 171 MMHG (ref 75–108)
PO2 ARTERIAL: 200 MMHG (ref 75–108)
PO2 ARTERIAL: 288 MMHG (ref 75–108)
PO2 ARTERIAL: 29.5 MM HG (ref 75–108)
PO2 ARTERIAL: 30.1 MM HG (ref 75–108)
PO2 ARTERIAL: 52.8 MMHG (ref 75–108)
PO2 ARTERIAL: 54.6 MMHG (ref 75–108)
PO2 ARTERIAL: 55.2 MMHG (ref 75–108)
PO2 ARTERIAL: 59.8 MMHG (ref 75–108)
PO2 ARTERIAL: 62.9 MMHG (ref 75–108)
PO2 ARTERIAL: 68.2 MMHG (ref 75–108)
PO2 ARTERIAL: 71 MMHG (ref 75–108)
PO2 ARTERIAL: 71.3 MMHG (ref 75–108)
PO2 ARTERIAL: 72.4 MMHG (ref 75–108)
PO2 ARTERIAL: 74.9 MMHG (ref 75–108)
PO2 ARTERIAL: 75.4 MMHG (ref 75–108)
PO2 ARTERIAL: 77.8 MMHG (ref 75–108)
PO2 ARTERIAL: 84.1 MM HG (ref 75–108)
PO2 ARTERIAL: 87.4 MMHG (ref 75–108)
PO2 ARTERIAL: 88.4 MMHG (ref 75–108)
PO2 ARTERIAL: 89.4 MMHG (ref 75–108)
POC POTASSIUM: 3 MMOL/L (ref 3.5–5.1)
POC POTASSIUM: 3 MMOL/L (ref 3.5–5.1)
POC POTASSIUM: 3.2 MMOL/L (ref 3.5–5.1)
POC POTASSIUM: 3.2 MMOL/L (ref 3.5–5.1)
POC POTASSIUM: 3.3 MMOL/L (ref 3.5–5.1)
POC POTASSIUM: 3.3 MMOL/L (ref 3.5–5.1)
POC POTASSIUM: 3.4 MMOL/L (ref 3.5–5.1)
POC POTASSIUM: 3.6 MMOL/L (ref 3.5–5.1)
POC POTASSIUM: 3.9 MMOL/L (ref 3.5–5.1)
POC POTASSIUM: 4.5 MMOL/L (ref 3.5–5.1)
POC SAMPLE TYPE: ABNORMAL
POC SAMPLE TYPE: NORMAL
POC SAMPLE TYPE: NORMAL
POTASSIUM REFLEX MAGNESIUM: 3.5 MMOL/L (ref 3.5–5.1)
POTASSIUM SERPL-SCNC: 3.2 MMOL/L (ref 3.5–5.1)
POTASSIUM SERPL-SCNC: 3.4 MMOL/L (ref 3.5–5.1)
POTASSIUM SERPL-SCNC: 3.4 MMOL/L (ref 3.5–5.1)
POTASSIUM SERPL-SCNC: 3.5 MMOL/L (ref 3.5–5.1)
POTASSIUM SERPL-SCNC: 3.6 MMOL/L (ref 3.5–5.1)
POTASSIUM SERPL-SCNC: 3.8 MMOL/L (ref 3.5–5.1)
POTASSIUM SERPL-SCNC: 3.9 MMOL/L (ref 3.5–5.1)
POTASSIUM SERPL-SCNC: 4.1 MMOL/L (ref 3.5–5.1)
POTASSIUM SERPL-SCNC: 4.3 MMOL/L (ref 3.5–5.1)
POTASSIUM SERPL-SCNC: 4.4 MMOL/L (ref 3.5–5.1)
POTASSIUM SERPL-SCNC: 4.6 MMOL/L (ref 3.5–5.1)
PRO-BNP: 1088 PG/ML (ref 0–124)
PROCALCITONIN: 3.53 NG/ML (ref 0–0.15)
PROCALCITONIN: 8.34 NG/ML (ref 0–0.15)
PROMYELOCYTES PERCENT: 2 %
PROTEIN UA: 30 MG/DL
PROTHROMBIN TIME: 17 SEC (ref 11.7–14.5)
RAPID INFLUENZA  B AGN: NEGATIVE
RAPID INFLUENZA A AGN: NEGATIVE
RBC # BLD: 3.06 M/UL (ref 4–5.2)
RBC # BLD: 3.22 M/UL (ref 4–5.2)
RBC # BLD: 3.39 M/UL (ref 4–5.2)
RBC # BLD: 3.54 M/UL (ref 4–5.2)
RBC # BLD: 3.57 M/UL (ref 4–5.2)
RBC # BLD: 3.72 M/UL (ref 4–5.2)
RBC # BLD: 4.11 M/UL (ref 4–5.2)
RBC # BLD: 4.28 M/UL (ref 4–5.2)
RBC # BLD: 4.31 M/UL (ref 4–5.2)
RBC # BLD: 5.03 M/UL (ref 4–5.2)
RBC # BLD: 5.21 M/UL (ref 4–5.2)
RBC UA: ABNORMAL /HPF (ref 0–4)
REPORT: NORMAL
RESPIRATORY PANEL PCR: ABNORMAL
SARS-COV-2, NAAT: NOT DETECTED
SLIDE REVIEW: ABNORMAL
SODIUM BLD-SCNC: 136 MMOL/L (ref 136–145)
SODIUM BLD-SCNC: 137 MMOL/L (ref 136–145)
SODIUM BLD-SCNC: 137 MMOL/L (ref 136–145)
SODIUM BLD-SCNC: 140 MMOL/L (ref 136–145)
SODIUM BLD-SCNC: 141 MMOL/L (ref 136–145)
SODIUM BLD-SCNC: 142 MMOL/L (ref 136–145)
SODIUM BLD-SCNC: 142 MMOL/L (ref 136–145)
SODIUM BLD-SCNC: 143 MMOL/L (ref 136–145)
SODIUM BLD-SCNC: 144 MMOL/L (ref 136–145)
SODIUM BLD-SCNC: 144 MMOL/L (ref 136–145)
SODIUM BLD-SCNC: 145 MMOL/L (ref 136–145)
SODIUM BLD-SCNC: 149 MMOL/L (ref 136–145)
SODIUM BLD-SCNC: 156 MMOL/L (ref 136–145)
SODIUM BLD-SCNC: 156 MMOL/L (ref 136–145)
SODIUM BLD-SCNC: 157 MMOL/L (ref 136–145)
SODIUM BLD-SCNC: 157 MMOL/L (ref 136–145)
SODIUM BLD-SCNC: 159 MMOL/L (ref 136–145)
SODIUM BLD-SCNC: 160 MMOL/L (ref 136–145)
SODIUM BLD-SCNC: 162 MMOL/L (ref 136–145)
SODIUM BLD-SCNC: 164 MMOL/L (ref 136–145)
SPECIFIC GRAVITY UA: 1.04 (ref 1–1.03)
TCO2 ARTERIAL: 19.9 MMOL/L
TCO2 ARTERIAL: 20.3 MMOL/L
TCO2 ARTERIAL: 20.5 MMOL/L
TCO2 ARTERIAL: 26.3 MMOL/L
TCO2 ARTERIAL: 28.4 MMOL/L
TCO2 ARTERIAL: 29.7 MMOL/L
TCO2 ARTERIAL: 31.1 MMOL/L
TCO2 ARTERIAL: 31.2 MMOL/L
TCO2 ARTERIAL: 32 MMOL/L
TCO2 ARTERIAL: 32.4 MMOL/L
TCO2 ARTERIAL: 33 MMOL/L
TCO2 ARTERIAL: 33.2 MMOL/L
TCO2 ARTERIAL: 33.5 MMOL/L
TCO2 ARTERIAL: 34.4 MMOL/L
TCO2 ARTERIAL: 34.5 MMOL/L
TCO2 ARTERIAL: 34.5 MMOL/L
TCO2 ARTERIAL: 34.8 MMOL/L
TCO2 ARTERIAL: 35 MMOL/L
TCO2 ARTERIAL: 35.3 MMOL/L
TCO2 ARTERIAL: 35.3 MMOL/L
TCO2 ARTERIAL: 35.4 MMOL/L
TCO2 ARTERIAL: 37.2 MMOL/L
TCO2 ARTERIAL: 37.5 MMOL/L
TCO2 ARTERIAL: 38.5 MMOL/L
TOTAL PROTEIN: 5.5 G/DL (ref 6.4–8.2)
TOTAL PROTEIN: 6 G/DL (ref 6.4–8.2)
TRIGL SERPL-MCNC: 341 MG/DL (ref 0–150)
TROPONIN: 0.05 NG/ML
TROPONIN: 0.06 NG/ML
TROPONIN: <0.01 NG/ML
URINE REFLEX TO CULTURE: ABNORMAL
URINE TYPE: ABNORMAL
UROBILINOGEN, URINE: 1 E.U./DL
WBC # BLD: 10.3 K/UL (ref 4–11)
WBC # BLD: 11 K/UL (ref 4–11)
WBC # BLD: 11 K/UL (ref 4–11)
WBC # BLD: 11.6 K/UL (ref 4–11)
WBC # BLD: 12.1 K/UL (ref 4–11)
WBC # BLD: 13 K/UL (ref 4–11)
WBC # BLD: 13.9 K/UL (ref 4–11)
WBC # BLD: 18.6 K/UL (ref 4–11)
WBC # BLD: 7.1 K/UL (ref 4–11)
WBC # BLD: 7.3 K/UL (ref 4–11)
WBC # BLD: 8.9 K/UL (ref 4–11)
WBC UA: ABNORMAL /HPF (ref 0–5)

## 2022-01-01 PROCEDURE — 84145 PROCALCITONIN (PCT): CPT

## 2022-01-01 PROCEDURE — 37799 UNLISTED PX VASCULAR SURGERY: CPT

## 2022-01-01 PROCEDURE — 85025 COMPLETE CBC W/AUTO DIFF WBC: CPT

## 2022-01-01 PROCEDURE — 6360000002 HC RX W HCPCS: Performed by: EMERGENCY MEDICINE

## 2022-01-01 PROCEDURE — 6370000000 HC RX 637 (ALT 250 FOR IP): Performed by: NURSE PRACTITIONER

## 2022-01-01 PROCEDURE — 6370000000 HC RX 637 (ALT 250 FOR IP): Performed by: INTERNAL MEDICINE

## 2022-01-01 PROCEDURE — 83735 ASSAY OF MAGNESIUM: CPT

## 2022-01-01 PROCEDURE — 84100 ASSAY OF PHOSPHORUS: CPT

## 2022-01-01 PROCEDURE — 2000000000 HC ICU R&B

## 2022-01-01 PROCEDURE — 0BH17EZ INSERTION OF ENDOTRACHEAL AIRWAY INTO TRACHEA, VIA NATURAL OR ARTIFICIAL OPENING: ICD-10-PCS | Performed by: EMERGENCY MEDICINE

## 2022-01-01 PROCEDURE — 80048 BASIC METABOLIC PNL TOTAL CA: CPT

## 2022-01-01 PROCEDURE — C9113 INJ PANTOPRAZOLE SODIUM, VIA: HCPCS | Performed by: INTERNAL MEDICINE

## 2022-01-01 PROCEDURE — 94761 N-INVAS EAR/PLS OXIMETRY MLT: CPT

## 2022-01-01 PROCEDURE — 2500000003 HC RX 250 WO HCPCS: Performed by: INTERNAL MEDICINE

## 2022-01-01 PROCEDURE — 2500000003 HC RX 250 WO HCPCS: Performed by: NURSE PRACTITIONER

## 2022-01-01 PROCEDURE — 2700000000 HC OXYGEN THERAPY PER DAY

## 2022-01-01 PROCEDURE — 2580000003 HC RX 258: Performed by: EMERGENCY MEDICINE

## 2022-01-01 PROCEDURE — 6360000002 HC RX W HCPCS: Performed by: INTERNAL MEDICINE

## 2022-01-01 PROCEDURE — 87635 SARS-COV-2 COVID-19 AMP PRB: CPT

## 2022-01-01 PROCEDURE — 6360000002 HC RX W HCPCS

## 2022-01-01 PROCEDURE — 84478 ASSAY OF TRIGLYCERIDES: CPT

## 2022-01-01 PROCEDURE — 83605 ASSAY OF LACTIC ACID: CPT

## 2022-01-01 PROCEDURE — 71045 X-RAY EXAM CHEST 1 VIEW: CPT

## 2022-01-01 PROCEDURE — 2580000003 HC RX 258: Performed by: INTERNAL MEDICINE

## 2022-01-01 PROCEDURE — 94003 VENT MGMT INPAT SUBQ DAY: CPT

## 2022-01-01 PROCEDURE — 36620 INSERTION CATHETER ARTERY: CPT

## 2022-01-01 PROCEDURE — 2580000003 HC RX 258: Performed by: NURSE PRACTITIONER

## 2022-01-01 PROCEDURE — 99291 CRITICAL CARE FIRST HOUR: CPT | Performed by: INTERNAL MEDICINE

## 2022-01-01 PROCEDURE — 6360000002 HC RX W HCPCS: Performed by: NURSE PRACTITIONER

## 2022-01-01 PROCEDURE — 31622 DX BRONCHOSCOPE/WASH: CPT

## 2022-01-01 PROCEDURE — 31624 DX BRONCHOSCOPE/LAVAGE: CPT | Performed by: INTERNAL MEDICINE

## 2022-01-01 PROCEDURE — A9569 TECHNETIUM TC-99M AUTO WBC: HCPCS | Performed by: NURSE PRACTITIONER

## 2022-01-01 PROCEDURE — 6370000000 HC RX 637 (ALT 250 FOR IP): Performed by: EMERGENCY MEDICINE

## 2022-01-01 PROCEDURE — 6370000000 HC RX 637 (ALT 250 FOR IP): Performed by: STUDENT IN AN ORGANIZED HEALTH CARE EDUCATION/TRAINING PROGRAM

## 2022-01-01 PROCEDURE — 78315 BONE IMAGING 3 PHASE: CPT

## 2022-01-01 PROCEDURE — 6360000002 HC RX W HCPCS: Performed by: HOSPITALIST

## 2022-01-01 PROCEDURE — 82947 ASSAY GLUCOSE BLOOD QUANT: CPT

## 2022-01-01 PROCEDURE — 2500000003 HC RX 250 WO HCPCS

## 2022-01-01 PROCEDURE — 82803 BLOOD GASES ANY COMBINATION: CPT

## 2022-01-01 PROCEDURE — 3430000000 HC RX DIAGNOSTIC RADIOPHARMACEUTICAL: Performed by: NURSE PRACTITIONER

## 2022-01-01 PROCEDURE — APPNB15 APP NON BILLABLE TIME 0-15 MINS: Performed by: NURSE PRACTITIONER

## 2022-01-01 PROCEDURE — 85610 PROTHROMBIN TIME: CPT

## 2022-01-01 PROCEDURE — 83880 ASSAY OF NATRIURETIC PEPTIDE: CPT

## 2022-01-01 PROCEDURE — 84132 ASSAY OF SERUM POTASSIUM: CPT

## 2022-01-01 PROCEDURE — 84484 ASSAY OF TROPONIN QUANT: CPT

## 2022-01-01 PROCEDURE — 93306 TTE W/DOPPLER COMPLETE: CPT

## 2022-01-01 PROCEDURE — 80053 COMPREHEN METABOLIC PANEL: CPT

## 2022-01-01 PROCEDURE — 99233 SBSQ HOSP IP/OBS HIGH 50: CPT | Performed by: NURSE PRACTITIONER

## 2022-01-01 PROCEDURE — 85730 THROMBOPLASTIN TIME PARTIAL: CPT

## 2022-01-01 PROCEDURE — 80307 DRUG TEST PRSMV CHEM ANLYZR: CPT

## 2022-01-01 PROCEDURE — 93005 ELECTROCARDIOGRAM TRACING: CPT | Performed by: NURSE PRACTITIONER

## 2022-01-01 PROCEDURE — 2580000003 HC RX 258: Performed by: STUDENT IN AN ORGANIZED HEALTH CARE EDUCATION/TRAINING PROGRAM

## 2022-01-01 PROCEDURE — 2500000003 HC RX 250 WO HCPCS: Performed by: STUDENT IN AN ORGANIZED HEALTH CARE EDUCATION/TRAINING PROGRAM

## 2022-01-01 PROCEDURE — 36415 COLL VENOUS BLD VENIPUNCTURE: CPT

## 2022-01-01 PROCEDURE — 5A1955Z RESPIRATORY VENTILATION, GREATER THAN 96 CONSECUTIVE HOURS: ICD-10-PCS | Performed by: EMERGENCY MEDICINE

## 2022-01-01 PROCEDURE — 82330 ASSAY OF CALCIUM: CPT

## 2022-01-01 PROCEDURE — 87205 SMEAR GRAM STAIN: CPT

## 2022-01-01 PROCEDURE — 6360000002 HC RX W HCPCS: Performed by: STUDENT IN AN ORGANIZED HEALTH CARE EDUCATION/TRAINING PROGRAM

## 2022-01-01 PROCEDURE — 87641 MR-STAPH DNA AMP PROBE: CPT

## 2022-01-01 PROCEDURE — 36556 INSERT NON-TUNNEL CV CATH: CPT

## 2022-01-01 PROCEDURE — 81001 URINALYSIS AUTO W/SCOPE: CPT

## 2022-01-01 PROCEDURE — 93010 ELECTROCARDIOGRAM REPORT: CPT | Performed by: INTERNAL MEDICINE

## 2022-01-01 PROCEDURE — 94760 N-INVAS EAR/PLS OXIMETRY 1: CPT

## 2022-01-01 PROCEDURE — 31500 INSERT EMERGENCY AIRWAY: CPT

## 2022-01-01 PROCEDURE — 36592 COLLECT BLOOD FROM PICC: CPT

## 2022-01-01 PROCEDURE — 70450 CT HEAD/BRAIN W/O DYE: CPT

## 2022-01-01 PROCEDURE — 80076 HEPATIC FUNCTION PANEL: CPT

## 2022-01-01 PROCEDURE — 99285 EMERGENCY DEPT VISIT HI MDM: CPT

## 2022-01-01 PROCEDURE — 6360000004 HC RX CONTRAST MEDICATION: Performed by: EMERGENCY MEDICINE

## 2022-01-01 PROCEDURE — 51702 INSERT TEMP BLADDER CATH: CPT

## 2022-01-01 PROCEDURE — 71260 CT THORAX DX C+: CPT | Performed by: EMERGENCY MEDICINE

## 2022-01-01 PROCEDURE — 87040 BLOOD CULTURE FOR BACTERIA: CPT

## 2022-01-01 PROCEDURE — 70551 MRI BRAIN STEM W/O DYE: CPT

## 2022-01-01 PROCEDURE — 36620 INSERTION CATHETER ARTERY: CPT | Performed by: INTERNAL MEDICINE

## 2022-01-01 PROCEDURE — 76937 US GUIDE VASCULAR ACCESS: CPT | Performed by: INTERNAL MEDICINE

## 2022-01-01 PROCEDURE — 88305 TISSUE EXAM BY PATHOLOGIST: CPT

## 2022-01-01 PROCEDURE — 84295 ASSAY OF SERUM SODIUM: CPT

## 2022-01-01 PROCEDURE — 87102 FUNGUS ISOLATION CULTURE: CPT

## 2022-01-01 PROCEDURE — 83036 HEMOGLOBIN GLYCOSYLATED A1C: CPT

## 2022-01-01 PROCEDURE — 95819 EEG AWAKE AND ASLEEP: CPT

## 2022-01-01 PROCEDURE — 83690 ASSAY OF LIPASE: CPT

## 2022-01-01 PROCEDURE — 87070 CULTURE OTHR SPECIMN AEROBIC: CPT

## 2022-01-01 PROCEDURE — 0202U NFCT DS 22 TRGT SARS-COV-2: CPT

## 2022-01-01 PROCEDURE — 93005 ELECTROCARDIOGRAM TRACING: CPT | Performed by: INTERNAL MEDICINE

## 2022-01-01 PROCEDURE — 99291 CRITICAL CARE FIRST HOUR: CPT

## 2022-01-01 PROCEDURE — 99223 1ST HOSP IP/OBS HIGH 75: CPT | Performed by: INTERNAL MEDICINE

## 2022-01-01 PROCEDURE — 87804 INFLUENZA ASSAY W/OPTIC: CPT

## 2022-01-01 PROCEDURE — 0B9C8ZX DRAINAGE OF RIGHT UPPER LUNG LOBE, VIA NATURAL OR ARTIFICIAL OPENING ENDOSCOPIC, DIAGNOSTIC: ICD-10-PCS | Performed by: INTERNAL MEDICINE

## 2022-01-01 PROCEDURE — 99233 SBSQ HOSP IP/OBS HIGH 50: CPT | Performed by: INTERNAL MEDICINE

## 2022-01-01 PROCEDURE — 88112 CYTOPATH CELL ENHANCE TECH: CPT

## 2022-01-01 PROCEDURE — 02HV33Z INSERTION OF INFUSION DEVICE INTO SUPERIOR VENA CAVA, PERCUTANEOUS APPROACH: ICD-10-PCS | Performed by: EMERGENCY MEDICINE

## 2022-01-01 PROCEDURE — 94002 VENT MGMT INPAT INIT DAY: CPT

## 2022-01-01 PROCEDURE — 2500000003 HC RX 250 WO HCPCS: Performed by: EMERGENCY MEDICINE

## 2022-01-01 RX ORDER — FENTANYL CITRATE-0.9 % NACL/PF 10 MCG/ML
25-200 PLASTIC BAG, INJECTION (ML) INTRAVENOUS CONTINUOUS
Status: DISCONTINUED | OUTPATIENT
Start: 2022-01-01 | End: 2022-01-01

## 2022-01-01 RX ORDER — FENTANYL CITRATE-0.9 % NACL/PF 10 MCG/ML
50 PLASTIC BAG, INJECTION (ML) INTRAVENOUS EVERY 30 MIN PRN
Status: DISCONTINUED | OUTPATIENT
Start: 2022-01-01 | End: 2022-01-01

## 2022-01-01 RX ORDER — HEPARIN SODIUM 10000 [USP'U]/100ML
0-4000 INJECTION, SOLUTION INTRAVENOUS CONTINUOUS
Status: DISCONTINUED | OUTPATIENT
Start: 2022-01-01 | End: 2022-01-01

## 2022-01-01 RX ORDER — ACETAMINOPHEN 650 MG/1
650 SUPPOSITORY RECTAL EVERY 4 HOURS PRN
Status: DISCONTINUED | OUTPATIENT
Start: 2022-01-01 | End: 2022-11-30 | Stop reason: HOSPADM

## 2022-01-01 RX ORDER — POTASSIUM CHLORIDE 29.8 MG/ML
20 INJECTION INTRAVENOUS
Status: COMPLETED | OUTPATIENT
Start: 2022-01-01 | End: 2022-01-01

## 2022-01-01 RX ORDER — MAGNESIUM SULFATE IN WATER 40 MG/ML
2000 INJECTION, SOLUTION INTRAVENOUS ONCE
Status: COMPLETED | OUTPATIENT
Start: 2022-01-01 | End: 2022-01-01

## 2022-01-01 RX ORDER — ONDANSETRON 4 MG/1
4 TABLET, ORALLY DISINTEGRATING ORAL EVERY 8 HOURS PRN
Status: DISCONTINUED | OUTPATIENT
Start: 2022-01-01 | End: 2022-11-30 | Stop reason: HOSPADM

## 2022-01-01 RX ORDER — PROPOFOL 10 MG/ML
5-50 INJECTION, EMULSION INTRAVENOUS CONTINUOUS
Status: DISCONTINUED | OUTPATIENT
Start: 2022-01-01 | End: 2022-01-01

## 2022-01-01 RX ORDER — ROCURONIUM BROMIDE 10 MG/ML
0.6 INJECTION, SOLUTION INTRAVENOUS ONCE
Status: COMPLETED | OUTPATIENT
Start: 2022-01-01 | End: 2022-01-01

## 2022-01-01 RX ORDER — AMLODIPINE BESYLATE 5 MG/1
5 TABLET ORAL DAILY
COMMUNITY

## 2022-01-01 RX ORDER — OSELTAMIVIR PHOSPHATE 6 MG/ML
75 FOR SUSPENSION ORAL 2 TIMES DAILY
Status: COMPLETED | OUTPATIENT
Start: 2022-01-01 | End: 2022-01-01

## 2022-01-01 RX ORDER — CALCIUM GLUCONATE 20 MG/ML
1000 INJECTION, SOLUTION INTRAVENOUS ONCE
Status: COMPLETED | OUTPATIENT
Start: 2022-01-01 | End: 2022-01-01

## 2022-01-01 RX ORDER — ASPIRIN 300 MG/1
600 SUPPOSITORY RECTAL ONCE
Status: COMPLETED | OUTPATIENT
Start: 2022-01-01 | End: 2022-01-01

## 2022-01-01 RX ORDER — ONDANSETRON 2 MG/ML
4 INJECTION INTRAMUSCULAR; INTRAVENOUS EVERY 6 HOURS PRN
Status: DISCONTINUED | OUTPATIENT
Start: 2022-01-01 | End: 2022-11-30 | Stop reason: HOSPADM

## 2022-01-01 RX ORDER — DEXTROSE MONOHYDRATE 100 MG/ML
INJECTION, SOLUTION INTRAVENOUS CONTINUOUS PRN
Status: DISCONTINUED | OUTPATIENT
Start: 2022-01-01 | End: 2022-01-01 | Stop reason: SDUPTHER

## 2022-01-01 RX ORDER — DEXTROSE MONOHYDRATE 50 MG/ML
INJECTION, SOLUTION INTRAVENOUS CONTINUOUS
Status: DISCONTINUED | OUTPATIENT
Start: 2022-01-01 | End: 2022-11-30 | Stop reason: HOSPADM

## 2022-01-01 RX ORDER — LEVETIRACETAM 500 MG/1
500 TABLET ORAL 2 TIMES DAILY
COMMUNITY

## 2022-01-01 RX ORDER — ENOXAPARIN SODIUM 100 MG/ML
40 INJECTION SUBCUTANEOUS DAILY
Status: DISCONTINUED | OUTPATIENT
Start: 2022-01-01 | End: 2022-11-30 | Stop reason: HOSPADM

## 2022-01-01 RX ORDER — METRONIDAZOLE 500 MG/100ML
500 INJECTION, SOLUTION INTRAVENOUS EVERY 8 HOURS
Status: COMPLETED | OUTPATIENT
Start: 2022-01-01 | End: 2022-01-01

## 2022-01-01 RX ORDER — 0.9 % SODIUM CHLORIDE 0.9 %
1000 INTRAVENOUS SOLUTION INTRAVENOUS ONCE
Status: COMPLETED | OUTPATIENT
Start: 2022-01-01 | End: 2022-01-01

## 2022-01-01 RX ORDER — OSELTAMIVIR PHOSPHATE 6 MG/ML
75 FOR SUSPENSION ORAL 2 TIMES DAILY
Status: CANCELLED | OUTPATIENT
Start: 2022-01-01 | End: 2022-01-01

## 2022-01-01 RX ORDER — ACETAMINOPHEN 325 MG/1
650 TABLET ORAL EVERY 4 HOURS PRN
Status: DISCONTINUED | OUTPATIENT
Start: 2022-01-01 | End: 2022-11-30 | Stop reason: HOSPADM

## 2022-01-01 RX ORDER — INSULIN GLARGINE 100 [IU]/ML
10 INJECTION, SOLUTION SUBCUTANEOUS NIGHTLY
Status: DISCONTINUED | OUTPATIENT
Start: 2022-01-01 | End: 2022-01-01

## 2022-01-01 RX ORDER — INSULIN LISPRO 100 [IU]/ML
0-4 INJECTION, SOLUTION INTRAVENOUS; SUBCUTANEOUS EVERY 4 HOURS
Status: DISCONTINUED | OUTPATIENT
Start: 2022-01-01 | End: 2022-01-01

## 2022-01-01 RX ORDER — CALCIUM GLUCONATE 20 MG/ML
2000 INJECTION, SOLUTION INTRAVENOUS ONCE
Status: COMPLETED | OUTPATIENT
Start: 2022-01-01 | End: 2022-01-01

## 2022-01-01 RX ORDER — CHLORHEXIDINE GLUCONATE 0.12 MG/ML
15 RINSE ORAL 2 TIMES DAILY
Status: DISCONTINUED | OUTPATIENT
Start: 2022-01-01 | End: 2022-11-30 | Stop reason: HOSPADM

## 2022-01-01 RX ORDER — DEXTROSE AND SODIUM CHLORIDE 5; .45 G/100ML; G/100ML
INJECTION, SOLUTION INTRAVENOUS CONTINUOUS
Status: DISCONTINUED | OUTPATIENT
Start: 2022-01-01 | End: 2022-01-01

## 2022-01-01 RX ORDER — INSULIN LISPRO 100 [IU]/ML
0-16 INJECTION, SOLUTION INTRAVENOUS; SUBCUTANEOUS EVERY 4 HOURS
Status: DISCONTINUED | OUTPATIENT
Start: 2022-01-01 | End: 2022-11-30 | Stop reason: HOSPADM

## 2022-01-01 RX ORDER — HEPARIN SODIUM 1000 [USP'U]/ML
4000 INJECTION, SOLUTION INTRAVENOUS; SUBCUTANEOUS ONCE
Status: COMPLETED | OUTPATIENT
Start: 2022-01-01 | End: 2022-01-01

## 2022-01-01 RX ORDER — FUROSEMIDE 10 MG/ML
40 INJECTION INTRAMUSCULAR; INTRAVENOUS 2 TIMES DAILY
Status: DISCONTINUED | OUTPATIENT
Start: 2022-01-01 | End: 2022-01-01

## 2022-01-01 RX ORDER — METHYLPREDNISOLONE SODIUM SUCCINATE 40 MG/ML
40 INJECTION, POWDER, LYOPHILIZED, FOR SOLUTION INTRAMUSCULAR; INTRAVENOUS EVERY 12 HOURS
Status: DISCONTINUED | OUTPATIENT
Start: 2022-01-01 | End: 2022-01-01

## 2022-01-01 RX ORDER — LACTOBACILLUS RHAMNOSUS GG 10B CELL
2 CAPSULE ORAL 2 TIMES DAILY
Status: DISCONTINUED | OUTPATIENT
Start: 2022-01-01 | End: 2022-11-30 | Stop reason: HOSPADM

## 2022-01-01 RX ORDER — PANTOPRAZOLE SODIUM 40 MG/10ML
40 INJECTION, POWDER, LYOPHILIZED, FOR SOLUTION INTRAVENOUS DAILY
Status: DISCONTINUED | OUTPATIENT
Start: 2022-01-01 | End: 2022-11-30 | Stop reason: HOSPADM

## 2022-01-01 RX ORDER — MINERAL OIL AND WHITE PETROLATUM 150; 830 MG/G; MG/G
OINTMENT OPHTHALMIC
Status: DISCONTINUED | OUTPATIENT
Start: 2022-01-01 | End: 2022-11-30 | Stop reason: HOSPADM

## 2022-01-01 RX ORDER — LEVETIRACETAM 10 MG/ML
1000 INJECTION INTRAVASCULAR EVERY 12 HOURS
Status: DISCONTINUED | OUTPATIENT
Start: 2022-01-01 | End: 2022-11-30 | Stop reason: HOSPADM

## 2022-01-01 RX ORDER — INSULIN LISPRO 100 [IU]/ML
0-8 INJECTION, SOLUTION INTRAVENOUS; SUBCUTANEOUS EVERY 4 HOURS
Status: DISCONTINUED | OUTPATIENT
Start: 2022-01-01 | End: 2022-01-01

## 2022-01-01 RX ORDER — HEPARIN SODIUM 1000 [USP'U]/ML
4000 INJECTION, SOLUTION INTRAVENOUS; SUBCUTANEOUS PRN
Status: DISCONTINUED | OUTPATIENT
Start: 2022-01-01 | End: 2022-01-01

## 2022-01-01 RX ORDER — DEXTROSE MONOHYDRATE 100 MG/ML
INJECTION, SOLUTION INTRAVENOUS CONTINUOUS PRN
Status: DISCONTINUED | OUTPATIENT
Start: 2022-01-01 | End: 2022-11-30 | Stop reason: HOSPADM

## 2022-01-01 RX ORDER — ACETAMINOPHEN 650 MG/1
650 SUPPOSITORY RECTAL EVERY 4 HOURS PRN
Status: DISCONTINUED | OUTPATIENT
Start: 2022-01-01 | End: 2022-01-01

## 2022-01-01 RX ORDER — HEPARIN SODIUM 1000 [USP'U]/ML
2000 INJECTION, SOLUTION INTRAVENOUS; SUBCUTANEOUS PRN
Status: DISCONTINUED | OUTPATIENT
Start: 2022-01-01 | End: 2022-01-01

## 2022-01-01 RX ORDER — INSULIN GLARGINE 100 [IU]/ML
15 INJECTION, SOLUTION SUBCUTANEOUS NIGHTLY
Status: DISCONTINUED | OUTPATIENT
Start: 2022-01-01 | End: 2022-01-01

## 2022-01-01 RX ORDER — INSULIN GLARGINE 100 [IU]/ML
20 INJECTION, SOLUTION SUBCUTANEOUS NIGHTLY
Status: DISCONTINUED | OUTPATIENT
Start: 2022-01-01 | End: 2022-11-30 | Stop reason: HOSPADM

## 2022-01-01 RX ORDER — METHYLPREDNISOLONE SODIUM SUCCINATE 40 MG/ML
40 INJECTION, POWDER, LYOPHILIZED, FOR SOLUTION INTRAMUSCULAR; INTRAVENOUS DAILY
Status: COMPLETED | OUTPATIENT
Start: 2022-01-01 | End: 2022-01-01

## 2022-01-01 RX ORDER — POTASSIUM CHLORIDE 29.8 MG/ML
20 INJECTION INTRAVENOUS ONCE
Status: COMPLETED | OUTPATIENT
Start: 2022-01-01 | End: 2022-01-01

## 2022-01-01 RX ADMIN — WHITE PETROLATUM 57.7 %-MINERAL OIL 31.9 % EYE OINTMENT: at 09:55

## 2022-01-01 RX ADMIN — MUPIROCIN: 20 OINTMENT TOPICAL at 20:27

## 2022-01-01 RX ADMIN — Medication 15 MCG/MIN: at 02:37

## 2022-01-01 RX ADMIN — CHLORHEXIDINE GLUCONATE 0.12% ORAL RINSE 15 ML: 1.2 LIQUID ORAL at 08:33

## 2022-01-01 RX ADMIN — PANTOPRAZOLE SODIUM 40 MG: 40 INJECTION, POWDER, FOR SOLUTION INTRAVENOUS at 08:55

## 2022-01-01 RX ADMIN — WHITE PETROLATUM 57.7 %-MINERAL OIL 31.9 % EYE OINTMENT: at 07:59

## 2022-01-01 RX ADMIN — LEVETIRACETAM 1000 MG: 10 INJECTION INTRAVENOUS at 20:36

## 2022-01-01 RX ADMIN — PROPOFOL 50 MCG/KG/MIN: 10 INJECTION, EMULSION INTRAVENOUS at 18:15

## 2022-01-01 RX ADMIN — MUPIROCIN: 20 OINTMENT TOPICAL at 08:18

## 2022-01-01 RX ADMIN — LEVETIRACETAM 1000 MG: 10 INJECTION INTRAVENOUS at 20:17

## 2022-01-01 RX ADMIN — METRONIDAZOLE 500 MG: 500 INJECTION, SOLUTION INTRAVENOUS at 01:32

## 2022-01-01 RX ADMIN — WHITE PETROLATUM 57.7 %-MINERAL OIL 31.9 % EYE OINTMENT: at 20:10

## 2022-01-01 RX ADMIN — Medication 2 CAPSULE: at 08:04

## 2022-01-01 RX ADMIN — Medication 2 CAPSULE: at 08:33

## 2022-01-01 RX ADMIN — LEVETIRACETAM 1000 MG: 10 INJECTION INTRAVENOUS at 07:50

## 2022-01-01 RX ADMIN — PROPOFOL 50 MCG/KG/MIN: 10 INJECTION, EMULSION INTRAVENOUS at 20:49

## 2022-01-01 RX ADMIN — INSULIN LISPRO 8 UNITS: 100 INJECTION, SOLUTION INTRAVENOUS; SUBCUTANEOUS at 16:39

## 2022-01-01 RX ADMIN — ASPIRIN 600 MG: 300 SUPPOSITORY RECTAL at 10:14

## 2022-01-01 RX ADMIN — WHITE PETROLATUM 57.7 %-MINERAL OIL 31.9 % EYE OINTMENT: at 16:57

## 2022-01-01 RX ADMIN — PROPOFOL 50 MCG/KG/MIN: 10 INJECTION, EMULSION INTRAVENOUS at 23:04

## 2022-01-01 RX ADMIN — Medication 75 MG: at 09:02

## 2022-01-01 RX ADMIN — MAGNESIUM SULFATE HEPTAHYDRATE 2000 MG: 40 INJECTION, SOLUTION INTRAVENOUS at 12:29

## 2022-01-01 RX ADMIN — Medication 2 CAPSULE: at 08:22

## 2022-01-01 RX ADMIN — Medication 2 CAPSULE: at 09:07

## 2022-01-01 RX ADMIN — CISATRACURIUM BESYLATE 4 MCG/KG/MIN: 10 INJECTION INTRAVENOUS at 12:47

## 2022-01-01 RX ADMIN — LEVETIRACETAM 1000 MG: 10 INJECTION INTRAVENOUS at 20:20

## 2022-01-01 RX ADMIN — Medication 75 MG: at 20:10

## 2022-01-01 RX ADMIN — INSULIN LISPRO 8 UNITS: 100 INJECTION, SOLUTION INTRAVENOUS; SUBCUTANEOUS at 04:41

## 2022-01-01 RX ADMIN — PANTOPRAZOLE SODIUM 40 MG: 40 INJECTION, POWDER, FOR SOLUTION INTRAVENOUS at 08:39

## 2022-01-01 RX ADMIN — SODIUM BICARBONATE: 84 INJECTION, SOLUTION INTRAVENOUS at 20:03

## 2022-01-01 RX ADMIN — CISATRACURIUM BESYLATE 4 MCG/KG/MIN: 10 INJECTION INTRAVENOUS at 20:20

## 2022-01-01 RX ADMIN — CALCIUM GLUCONATE 1000 MG: 20 INJECTION, SOLUTION INTRAVENOUS at 09:15

## 2022-01-01 RX ADMIN — METRONIDAZOLE 500 MG: 500 INJECTION, SOLUTION INTRAVENOUS at 18:00

## 2022-01-01 RX ADMIN — WHITE PETROLATUM 57.7 %-MINERAL OIL 31.9 % EYE OINTMENT: at 16:54

## 2022-01-01 RX ADMIN — CEFEPIME 2000 MG: 2 INJECTION, POWDER, FOR SOLUTION INTRAVENOUS at 01:42

## 2022-01-01 RX ADMIN — Medication 15 MCG/MIN: at 06:31

## 2022-01-01 RX ADMIN — METRONIDAZOLE 500 MG: 500 INJECTION, SOLUTION INTRAVENOUS at 08:55

## 2022-01-01 RX ADMIN — PROPOFOL 50 MCG/KG/MIN: 10 INJECTION, EMULSION INTRAVENOUS at 13:34

## 2022-01-01 RX ADMIN — MUPIROCIN: 20 OINTMENT TOPICAL at 08:31

## 2022-01-01 RX ADMIN — PROPOFOL 50 MCG/KG/MIN: 10 INJECTION, EMULSION INTRAVENOUS at 04:00

## 2022-01-01 RX ADMIN — Medication 75 MG: at 20:28

## 2022-01-01 RX ADMIN — SODIUM BICARBONATE: 84 INJECTION, SOLUTION INTRAVENOUS at 08:40

## 2022-01-01 RX ADMIN — DOCUSATE SODIUM 100 MG: 50 LIQUID ORAL at 08:55

## 2022-01-01 RX ADMIN — POTASSIUM PHOSPHATE, MONOBASIC AND POTASSIUM PHOSPHATE, DIBASIC 20 MMOL: 224; 236 INJECTION, SOLUTION, CONCENTRATE INTRAVENOUS at 12:19

## 2022-01-01 RX ADMIN — LEVETIRACETAM 1000 MG: 10 INJECTION INTRAVENOUS at 20:59

## 2022-01-01 RX ADMIN — METRONIDAZOLE 500 MG: 500 INJECTION, SOLUTION INTRAVENOUS at 17:11

## 2022-01-01 RX ADMIN — DOCUSATE SODIUM 100 MG: 50 LIQUID ORAL at 08:33

## 2022-01-01 RX ADMIN — LEVETIRACETAM 1000 MG: 10 INJECTION INTRAVENOUS at 21:55

## 2022-01-01 RX ADMIN — Medication 50 MCG/HR: at 01:04

## 2022-01-01 RX ADMIN — DEXTROSE MONOHYDRATE: 50 INJECTION, SOLUTION INTRAVENOUS at 08:14

## 2022-01-01 RX ADMIN — PROPOFOL 50 MCG/KG/MIN: 10 INJECTION, EMULSION INTRAVENOUS at 23:40

## 2022-01-01 RX ADMIN — MUPIROCIN: 20 OINTMENT TOPICAL at 20:41

## 2022-01-01 RX ADMIN — CEFEPIME 2000 MG: 2 INJECTION, POWDER, FOR SOLUTION INTRAVENOUS at 02:06

## 2022-01-01 RX ADMIN — CEFEPIME 2000 MG: 2 INJECTION, POWDER, FOR SOLUTION INTRAVENOUS at 02:02

## 2022-01-01 RX ADMIN — WHITE PETROLATUM 57.7 %-MINERAL OIL 31.9 % EYE OINTMENT: at 20:09

## 2022-01-01 RX ADMIN — Medication 20 MEQ: at 05:31

## 2022-01-01 RX ADMIN — PANTOPRAZOLE SODIUM 40 MG: 40 INJECTION, POWDER, FOR SOLUTION INTRAVENOUS at 08:23

## 2022-01-01 RX ADMIN — METRONIDAZOLE 500 MG: 500 INJECTION, SOLUTION INTRAVENOUS at 18:37

## 2022-01-01 RX ADMIN — WHITE PETROLATUM 57.7 %-MINERAL OIL 31.9 % EYE OINTMENT: at 16:02

## 2022-01-01 RX ADMIN — WHITE PETROLATUM 57.7 %-MINERAL OIL 31.9 % EYE OINTMENT: at 00:14

## 2022-01-01 RX ADMIN — Medication 75 MG: at 08:44

## 2022-01-01 RX ADMIN — PANTOPRAZOLE SODIUM 40 MG: 40 INJECTION, POWDER, FOR SOLUTION INTRAVENOUS at 09:08

## 2022-01-01 RX ADMIN — DOCUSATE SODIUM 100 MG: 50 LIQUID ORAL at 09:44

## 2022-01-01 RX ADMIN — Medication 15 MCG/MIN: at 10:11

## 2022-01-01 RX ADMIN — LEVETIRACETAM 1000 MG: 10 INJECTION INTRAVENOUS at 09:36

## 2022-01-01 RX ADMIN — PROPOFOL 50 MCG/KG/MIN: 10 INJECTION, EMULSION INTRAVENOUS at 15:42

## 2022-01-01 RX ADMIN — INSULIN LISPRO 4 UNITS: 100 INJECTION, SOLUTION INTRAVENOUS; SUBCUTANEOUS at 03:49

## 2022-01-01 RX ADMIN — Medication 75 MG: at 20:30

## 2022-01-01 RX ADMIN — LEVETIRACETAM 1000 MG: 10 INJECTION INTRAVENOUS at 10:10

## 2022-01-01 RX ADMIN — PROPOFOL 20 MCG/KG/MIN: 10 INJECTION, EMULSION INTRAVENOUS at 05:54

## 2022-01-01 RX ADMIN — Medication 2 CAPSULE: at 21:03

## 2022-01-01 RX ADMIN — CHLORHEXIDINE GLUCONATE 0.12% ORAL RINSE 15 ML: 1.2 LIQUID ORAL at 20:33

## 2022-01-01 RX ADMIN — LEVETIRACETAM 1000 MG: 10 INJECTION INTRAVENOUS at 20:29

## 2022-01-01 RX ADMIN — INSULIN LISPRO 12 UNITS: 100 INJECTION, SOLUTION INTRAVENOUS; SUBCUTANEOUS at 09:44

## 2022-01-01 RX ADMIN — METHYLPREDNISOLONE SODIUM SUCCINATE 40 MG: 40 INJECTION, POWDER, FOR SOLUTION INTRAMUSCULAR; INTRAVENOUS at 08:23

## 2022-01-01 RX ADMIN — WHITE PETROLATUM 57.7 %-MINERAL OIL 31.9 % EYE OINTMENT: at 20:14

## 2022-01-01 RX ADMIN — PROPOFOL 50 MCG/KG/MIN: 10 INJECTION, EMULSION INTRAVENOUS at 00:38

## 2022-01-01 RX ADMIN — METHYLPREDNISOLONE SODIUM SUCCINATE 40 MG: 40 INJECTION, POWDER, FOR SOLUTION INTRAMUSCULAR; INTRAVENOUS at 08:12

## 2022-01-01 RX ADMIN — PROPOFOL 50 MCG/KG/MIN: 10 INJECTION, EMULSION INTRAVENOUS at 06:29

## 2022-01-01 RX ADMIN — METRONIDAZOLE 500 MG: 500 INJECTION, SOLUTION INTRAVENOUS at 10:43

## 2022-01-01 RX ADMIN — POTASSIUM CHLORIDE 20 MEQ: 29.8 INJECTION, SOLUTION INTRAVENOUS at 01:16

## 2022-01-01 RX ADMIN — METHYLPREDNISOLONE SODIUM SUCCINATE 40 MG: 40 INJECTION, POWDER, FOR SOLUTION INTRAMUSCULAR; INTRAVENOUS at 20:27

## 2022-01-01 RX ADMIN — ENOXAPARIN SODIUM 40 MG: 100 INJECTION SUBCUTANEOUS at 09:08

## 2022-01-01 RX ADMIN — PROPOFOL 50 MCG/KG/MIN: 10 INJECTION, EMULSION INTRAVENOUS at 10:06

## 2022-01-01 RX ADMIN — DEXTROSE MONOHYDRATE: 50 INJECTION, SOLUTION INTRAVENOUS at 00:56

## 2022-01-01 RX ADMIN — WHITE PETROLATUM 57.7 %-MINERAL OIL 31.9 % EYE OINTMENT: at 08:56

## 2022-01-01 RX ADMIN — CHLORHEXIDINE GLUCONATE 0.12% ORAL RINSE 15 ML: 1.2 LIQUID ORAL at 20:06

## 2022-01-01 RX ADMIN — CHLORHEXIDINE GLUCONATE 0.12% ORAL RINSE 15 ML: 1.2 LIQUID ORAL at 20:05

## 2022-01-01 RX ADMIN — CEFEPIME 2000 MG: 2 INJECTION, POWDER, FOR SOLUTION INTRAVENOUS at 09:19

## 2022-01-01 RX ADMIN — WHITE PETROLATUM 57.7 %-MINERAL OIL 31.9 % EYE OINTMENT: at 23:45

## 2022-01-01 RX ADMIN — DEXTROSE MONOHYDRATE: 50 INJECTION, SOLUTION INTRAVENOUS at 16:23

## 2022-01-01 RX ADMIN — CHLORHEXIDINE GLUCONATE 0.12% ORAL RINSE 15 ML: 1.2 LIQUID ORAL at 08:38

## 2022-01-01 RX ADMIN — INSULIN LISPRO 4 UNITS: 100 INJECTION, SOLUTION INTRAVENOUS; SUBCUTANEOUS at 16:54

## 2022-01-01 RX ADMIN — INSULIN LISPRO 4 UNITS: 100 INJECTION, SOLUTION INTRAVENOUS; SUBCUTANEOUS at 20:22

## 2022-01-01 RX ADMIN — METRONIDAZOLE 500 MG: 500 INJECTION, SOLUTION INTRAVENOUS at 10:01

## 2022-01-01 RX ADMIN — METHYLPREDNISOLONE SODIUM SUCCINATE 40 MG: 40 INJECTION, POWDER, FOR SOLUTION INTRAMUSCULAR; INTRAVENOUS at 08:40

## 2022-01-01 RX ADMIN — PROPOFOL 50 MCG/KG/MIN: 10 INJECTION, EMULSION INTRAVENOUS at 09:48

## 2022-01-01 RX ADMIN — MUPIROCIN: 20 OINTMENT TOPICAL at 08:40

## 2022-01-01 RX ADMIN — METRONIDAZOLE 500 MG: 500 INJECTION, SOLUTION INTRAVENOUS at 09:23

## 2022-01-01 RX ADMIN — CEFEPIME 2000 MG: 2 INJECTION, POWDER, FOR SOLUTION INTRAVENOUS at 01:52

## 2022-01-01 RX ADMIN — INSULIN LISPRO 12 UNITS: 100 INJECTION, SOLUTION INTRAVENOUS; SUBCUTANEOUS at 00:12

## 2022-01-01 RX ADMIN — INSULIN LISPRO 4 UNITS: 100 INJECTION, SOLUTION INTRAVENOUS; SUBCUTANEOUS at 20:09

## 2022-01-01 RX ADMIN — Medication 2 CAPSULE: at 20:33

## 2022-01-01 RX ADMIN — WHITE PETROLATUM 57.7 %-MINERAL OIL 31.9 % EYE OINTMENT: at 20:21

## 2022-01-01 RX ADMIN — DOCUSATE SODIUM 100 MG: 50 LIQUID ORAL at 08:04

## 2022-01-01 RX ADMIN — INSULIN LISPRO 6 UNITS: 100 INJECTION, SOLUTION INTRAVENOUS; SUBCUTANEOUS at 03:47

## 2022-01-01 RX ADMIN — PANTOPRAZOLE SODIUM 40 MG: 40 INJECTION, POWDER, FOR SOLUTION INTRAVENOUS at 07:51

## 2022-01-01 RX ADMIN — PROPOFOL 50 MCG/KG/MIN: 10 INJECTION, EMULSION INTRAVENOUS at 06:34

## 2022-01-01 RX ADMIN — Medication 2 CAPSULE: at 08:02

## 2022-01-01 RX ADMIN — WHITE PETROLATUM 57.7 %-MINERAL OIL 31.9 % EYE OINTMENT: at 12:15

## 2022-01-01 RX ADMIN — CEFEPIME 2000 MG: 2 INJECTION, POWDER, FOR SOLUTION INTRAVENOUS at 18:07

## 2022-01-01 RX ADMIN — EXAMETAZIME 25 MILLICURIE: KIT at 13:36

## 2022-01-01 RX ADMIN — METHYLPREDNISOLONE SODIUM SUCCINATE 40 MG: 40 INJECTION, POWDER, FOR SOLUTION INTRAMUSCULAR; INTRAVENOUS at 20:16

## 2022-01-01 RX ADMIN — METRONIDAZOLE 500 MG: 500 INJECTION, SOLUTION INTRAVENOUS at 02:07

## 2022-01-01 RX ADMIN — HEPARIN SODIUM 1000 UNITS/HR: 10000 INJECTION, SOLUTION INTRAVENOUS at 06:25

## 2022-01-01 RX ADMIN — INSULIN GLARGINE 10 UNITS: 100 INJECTION, SOLUTION SUBCUTANEOUS at 20:56

## 2022-01-01 RX ADMIN — METRONIDAZOLE 500 MG: 500 INJECTION, SOLUTION INTRAVENOUS at 02:01

## 2022-01-01 RX ADMIN — WHITE PETROLATUM 57.7 %-MINERAL OIL 31.9 % EYE OINTMENT: at 23:47

## 2022-01-01 RX ADMIN — METHYLPREDNISOLONE SODIUM SUCCINATE 40 MG: 40 INJECTION, POWDER, FOR SOLUTION INTRAMUSCULAR; INTRAVENOUS at 09:42

## 2022-01-01 RX ADMIN — PROPOFOL 50 MCG/KG/MIN: 10 INJECTION, EMULSION INTRAVENOUS at 06:28

## 2022-01-01 RX ADMIN — DEXTROSE MONOHYDRATE: 50 INJECTION, SOLUTION INTRAVENOUS at 23:04

## 2022-01-01 RX ADMIN — PANTOPRAZOLE SODIUM 40 MG: 40 INJECTION, POWDER, FOR SOLUTION INTRAVENOUS at 08:04

## 2022-01-01 RX ADMIN — ENOXAPARIN SODIUM 40 MG: 100 INJECTION SUBCUTANEOUS at 08:22

## 2022-01-01 RX ADMIN — PANTOPRAZOLE SODIUM 40 MG: 40 INJECTION, POWDER, FOR SOLUTION INTRAVENOUS at 09:42

## 2022-01-01 RX ADMIN — Medication 10 MCG/MIN: at 14:49

## 2022-01-01 RX ADMIN — DEXTROSE MONOHYDRATE: 50 INJECTION, SOLUTION INTRAVENOUS at 21:44

## 2022-01-01 RX ADMIN — INSULIN LISPRO 8 UNITS: 100 INJECTION, SOLUTION INTRAVENOUS; SUBCUTANEOUS at 11:59

## 2022-01-01 RX ADMIN — INSULIN HUMAN 1.5 UNITS/HR: 1 INJECTION, SOLUTION INTRAVENOUS at 01:07

## 2022-01-01 RX ADMIN — PROPOFOL 50 MCG/KG/MIN: 10 INJECTION, EMULSION INTRAVENOUS at 03:25

## 2022-01-01 RX ADMIN — DEXTROSE MONOHYDRATE: 50 INJECTION, SOLUTION INTRAVENOUS at 06:04

## 2022-01-01 RX ADMIN — METHYLPREDNISOLONE SODIUM SUCCINATE 40 MG: 40 INJECTION, POWDER, FOR SOLUTION INTRAMUSCULAR; INTRAVENOUS at 08:33

## 2022-01-01 RX ADMIN — Medication 2 CAPSULE: at 08:44

## 2022-01-01 RX ADMIN — Medication 50 MCG/HR: at 19:01

## 2022-01-01 RX ADMIN — WHITE PETROLATUM 57.7 %-MINERAL OIL 31.9 % EYE OINTMENT: at 12:14

## 2022-01-01 RX ADMIN — INSULIN LISPRO 16 UNITS: 100 INJECTION, SOLUTION INTRAVENOUS; SUBCUTANEOUS at 16:52

## 2022-01-01 RX ADMIN — METRONIDAZOLE 500 MG: 500 INJECTION, SOLUTION INTRAVENOUS at 02:15

## 2022-01-01 RX ADMIN — CHLORHEXIDINE GLUCONATE 0.12% ORAL RINSE 15 ML: 1.2 LIQUID ORAL at 08:56

## 2022-01-01 RX ADMIN — PROPOFOL 50 MCG/KG/MIN: 10 INJECTION, EMULSION INTRAVENOUS at 01:54

## 2022-01-01 RX ADMIN — DEXTROSE AND SODIUM CHLORIDE: 5; 450 INJECTION, SOLUTION INTRAVENOUS at 09:29

## 2022-01-01 RX ADMIN — CEFEPIME 2000 MG: 2 INJECTION, POWDER, FOR SOLUTION INTRAVENOUS at 01:35

## 2022-01-01 RX ADMIN — METRONIDAZOLE 500 MG: 500 INJECTION, SOLUTION INTRAVENOUS at 01:34

## 2022-01-01 RX ADMIN — CISATRACURIUM BESYLATE 2 MCG/KG/MIN: 10 INJECTION INTRAVENOUS at 23:13

## 2022-01-01 RX ADMIN — CHLORHEXIDINE GLUCONATE 0.12% ORAL RINSE 15 ML: 1.2 LIQUID ORAL at 20:10

## 2022-01-01 RX ADMIN — CHLORHEXIDINE GLUCONATE 0.12% ORAL RINSE 15 ML: 1.2 LIQUID ORAL at 09:24

## 2022-01-01 RX ADMIN — Medication 2 CAPSULE: at 20:21

## 2022-01-01 RX ADMIN — METHYLPREDNISOLONE SODIUM SUCCINATE 40 MG: 40 INJECTION, POWDER, FOR SOLUTION INTRAMUSCULAR; INTRAVENOUS at 21:22

## 2022-01-01 RX ADMIN — Medication 16 MG: at 05:15

## 2022-01-01 RX ADMIN — PROPOFOL 50 MCG/KG/MIN: 10 INJECTION, EMULSION INTRAVENOUS at 05:56

## 2022-01-01 RX ADMIN — ENOXAPARIN SODIUM 40 MG: 100 INJECTION SUBCUTANEOUS at 08:39

## 2022-01-01 RX ADMIN — CALCIUM GLUCONATE 2000 MG: 20 INJECTION, SOLUTION INTRAVENOUS at 09:46

## 2022-01-01 RX ADMIN — INSULIN LISPRO 4 UNITS: 100 INJECTION, SOLUTION INTRAVENOUS; SUBCUTANEOUS at 23:47

## 2022-01-01 RX ADMIN — Medication 2 CAPSULE: at 20:28

## 2022-01-01 RX ADMIN — Medication 2 CAPSULE: at 08:29

## 2022-01-01 RX ADMIN — METRONIDAZOLE 500 MG: 500 INJECTION, SOLUTION INTRAVENOUS at 10:12

## 2022-01-01 RX ADMIN — LEVETIRACETAM 1000 MG: 10 INJECTION INTRAVENOUS at 08:46

## 2022-01-01 RX ADMIN — CISATRACURIUM BESYLATE 4 MCG/KG/MIN: 10 INJECTION INTRAVENOUS at 13:09

## 2022-01-01 RX ADMIN — Medication 2 CAPSULE: at 20:10

## 2022-01-01 RX ADMIN — CALCIUM GLUCONATE 1000 MG: 20 INJECTION, SOLUTION INTRAVENOUS at 06:28

## 2022-01-01 RX ADMIN — Medication 50 MCG/HR: at 18:59

## 2022-01-01 RX ADMIN — DEXTROSE MONOHYDRATE: 50 INJECTION, SOLUTION INTRAVENOUS at 04:38

## 2022-01-01 RX ADMIN — WHITE PETROLATUM 57.7 %-MINERAL OIL 31.9 % EYE OINTMENT: at 03:47

## 2022-01-01 RX ADMIN — PROPOFOL 50 MCG/KG/MIN: 10 INJECTION, EMULSION INTRAVENOUS at 14:10

## 2022-01-01 RX ADMIN — PANTOPRAZOLE SODIUM 40 MG: 40 INJECTION, POWDER, FOR SOLUTION INTRAVENOUS at 08:35

## 2022-01-01 RX ADMIN — METHYLPREDNISOLONE SODIUM SUCCINATE 40 MG: 40 INJECTION, POWDER, FOR SOLUTION INTRAMUSCULAR; INTRAVENOUS at 20:21

## 2022-01-01 RX ADMIN — CISATRACURIUM BESYLATE 2 MCG/KG/MIN: 10 INJECTION INTRAVENOUS at 12:16

## 2022-01-01 RX ADMIN — METRONIDAZOLE 500 MG: 500 INJECTION, SOLUTION INTRAVENOUS at 18:28

## 2022-01-01 RX ADMIN — METRONIDAZOLE 500 MG: 500 INJECTION, SOLUTION INTRAVENOUS at 01:52

## 2022-01-01 RX ADMIN — WHITE PETROLATUM 57.7 %-MINERAL OIL 31.9 % EYE OINTMENT: at 20:23

## 2022-01-01 RX ADMIN — POTASSIUM PHOSPHATE, MONOBASIC AND POTASSIUM PHOSPHATE, DIBASIC 20 MMOL: 224; 236 INJECTION, SOLUTION, CONCENTRATE INTRAVENOUS at 10:56

## 2022-01-01 RX ADMIN — WHITE PETROLATUM 57.7 %-MINERAL OIL 31.9 % EYE OINTMENT: at 04:17

## 2022-01-01 RX ADMIN — DEXTROSE MONOHYDRATE: 50 INJECTION, SOLUTION INTRAVENOUS at 16:37

## 2022-01-01 RX ADMIN — WHITE PETROLATUM 57.7 %-MINERAL OIL 31.9 % EYE OINTMENT: at 00:09

## 2022-01-01 RX ADMIN — LEVETIRACETAM 1000 MG: 10 INJECTION INTRAVENOUS at 07:57

## 2022-01-01 RX ADMIN — WHITE PETROLATUM 57.7 %-MINERAL OIL 31.9 % EYE OINTMENT: at 08:42

## 2022-01-01 RX ADMIN — DEXTROSE MONOHYDRATE: 50 INJECTION, SOLUTION INTRAVENOUS at 12:02

## 2022-01-01 RX ADMIN — DEXTROSE MONOHYDRATE: 50 INJECTION, SOLUTION INTRAVENOUS at 23:16

## 2022-01-01 RX ADMIN — PIPERACILLIN AND TAZOBACTAM 3375 MG: 3; .375 INJECTION, POWDER, FOR SOLUTION INTRAVENOUS at 17:49

## 2022-01-01 RX ADMIN — FUROSEMIDE 40 MG: 10 INJECTION, SOLUTION INTRAMUSCULAR; INTRAVENOUS at 09:16

## 2022-01-01 RX ADMIN — Medication 7 MCG/MIN: at 04:07

## 2022-01-01 RX ADMIN — DOCUSATE SODIUM 100 MG: 50 LIQUID ORAL at 09:08

## 2022-01-01 RX ADMIN — DEXTROSE MONOHYDRATE: 50 INJECTION, SOLUTION INTRAVENOUS at 21:32

## 2022-01-01 RX ADMIN — SODIUM BICARBONATE: 84 INJECTION, SOLUTION INTRAVENOUS at 20:44

## 2022-01-01 RX ADMIN — WHITE PETROLATUM 57.7 %-MINERAL OIL 31.9 % EYE OINTMENT: at 17:07

## 2022-01-01 RX ADMIN — POTASSIUM CHLORIDE 20 MEQ: 29.8 INJECTION, SOLUTION INTRAVENOUS at 23:09

## 2022-01-01 RX ADMIN — PROPOFOL 40 MCG/KG/MIN: 10 INJECTION, EMULSION INTRAVENOUS at 02:44

## 2022-01-01 RX ADMIN — METHYLPREDNISOLONE SODIUM SUCCINATE 40 MG: 40 INJECTION, POWDER, FOR SOLUTION INTRAMUSCULAR; INTRAVENOUS at 09:09

## 2022-01-01 RX ADMIN — CHLORHEXIDINE GLUCONATE 0.12% ORAL RINSE 15 ML: 1.2 LIQUID ORAL at 20:27

## 2022-01-01 RX ADMIN — LEVETIRACETAM 1000 MG: 10 INJECTION INTRAVENOUS at 07:58

## 2022-01-01 RX ADMIN — CEFEPIME 2000 MG: 2 INJECTION, POWDER, FOR SOLUTION INTRAVENOUS at 18:28

## 2022-01-01 RX ADMIN — CEFEPIME 2000 MG: 2 INJECTION, POWDER, FOR SOLUTION INTRAVENOUS at 10:39

## 2022-01-01 RX ADMIN — CEFEPIME 2000 MG: 2 INJECTION, POWDER, FOR SOLUTION INTRAVENOUS at 09:59

## 2022-01-01 RX ADMIN — WHITE PETROLATUM 57.7 %-MINERAL OIL 31.9 % EYE OINTMENT: at 04:51

## 2022-01-01 RX ADMIN — CEFEPIME 2000 MG: 2 INJECTION, POWDER, FOR SOLUTION INTRAVENOUS at 18:24

## 2022-01-01 RX ADMIN — WHITE PETROLATUM 57.7 %-MINERAL OIL 31.9 % EYE OINTMENT: at 00:18

## 2022-01-01 RX ADMIN — Medication 2 CAPSULE: at 09:42

## 2022-01-01 RX ADMIN — WHITE PETROLATUM 57.7 %-MINERAL OIL 31.9 % EYE OINTMENT: at 12:44

## 2022-01-01 RX ADMIN — PROPOFOL 50 MCG/KG/MIN: 10 INJECTION, EMULSION INTRAVENOUS at 13:51

## 2022-01-01 RX ADMIN — WHITE PETROLATUM 57.7 %-MINERAL OIL 31.9 % EYE OINTMENT: at 12:17

## 2022-01-01 RX ADMIN — CEFEPIME 2000 MG: 2 INJECTION, POWDER, FOR SOLUTION INTRAVENOUS at 10:09

## 2022-01-01 RX ADMIN — CEFEPIME 2000 MG: 2 INJECTION, POWDER, FOR SOLUTION INTRAVENOUS at 17:52

## 2022-01-01 RX ADMIN — WHITE PETROLATUM 57.7 %-MINERAL OIL 31.9 % EYE OINTMENT: at 04:15

## 2022-01-01 RX ADMIN — METRONIDAZOLE 500 MG: 500 INJECTION, SOLUTION INTRAVENOUS at 17:55

## 2022-01-01 RX ADMIN — METRONIDAZOLE 500 MG: 500 INJECTION, SOLUTION INTRAVENOUS at 18:23

## 2022-01-01 RX ADMIN — DEXTROSE MONOHYDRATE: 50 INJECTION, SOLUTION INTRAVENOUS at 11:13

## 2022-01-01 RX ADMIN — WHITE PETROLATUM 57.7 %-MINERAL OIL 31.9 % EYE OINTMENT: at 12:49

## 2022-01-01 RX ADMIN — INSULIN LISPRO 4 UNITS: 100 INJECTION, SOLUTION INTRAVENOUS; SUBCUTANEOUS at 03:23

## 2022-01-01 RX ADMIN — ENOXAPARIN SODIUM 40 MG: 100 INJECTION SUBCUTANEOUS at 08:02

## 2022-01-01 RX ADMIN — PIPERACILLIN AND TAZOBACTAM 3375 MG: 3; .375 INJECTION, POWDER, FOR SOLUTION INTRAVENOUS at 00:41

## 2022-01-01 RX ADMIN — WHITE PETROLATUM 57.7 %-MINERAL OIL 31.9 % EYE OINTMENT: at 08:31

## 2022-01-01 RX ADMIN — DEXTROSE MONOHYDRATE: 50 INJECTION, SOLUTION INTRAVENOUS at 20:08

## 2022-01-01 RX ADMIN — Medication 14 MCG/MIN: at 21:20

## 2022-01-01 RX ADMIN — CEFEPIME 2000 MG: 2 INJECTION, POWDER, FOR SOLUTION INTRAVENOUS at 18:38

## 2022-01-01 RX ADMIN — PANTOPRAZOLE SODIUM 40 MG: 40 INJECTION, POWDER, FOR SOLUTION INTRAVENOUS at 08:13

## 2022-01-01 RX ADMIN — METRONIDAZOLE 500 MG: 500 INJECTION, SOLUTION INTRAVENOUS at 17:49

## 2022-01-01 RX ADMIN — SODIUM BICARBONATE: 84 INJECTION, SOLUTION INTRAVENOUS at 08:04

## 2022-01-01 RX ADMIN — WHITE PETROLATUM 57.7 %-MINERAL OIL 31.9 % EYE OINTMENT: at 16:09

## 2022-01-01 RX ADMIN — Medication 20 MEQ: at 06:32

## 2022-01-01 RX ADMIN — Medication 75 MG: at 20:29

## 2022-01-01 RX ADMIN — ACETAMINOPHEN 650 MG: 325 TABLET ORAL at 02:42

## 2022-01-01 RX ADMIN — INSULIN LISPRO 4 UNITS: 100 INJECTION, SOLUTION INTRAVENOUS; SUBCUTANEOUS at 08:29

## 2022-01-01 RX ADMIN — CHLORHEXIDINE GLUCONATE 0.12% ORAL RINSE 15 ML: 1.2 LIQUID ORAL at 09:07

## 2022-01-01 RX ADMIN — DEXTROSE MONOHYDRATE: 50 INJECTION, SOLUTION INTRAVENOUS at 07:46

## 2022-01-01 RX ADMIN — INSULIN LISPRO 16 UNITS: 100 INJECTION, SOLUTION INTRAVENOUS; SUBCUTANEOUS at 20:30

## 2022-01-01 RX ADMIN — INSULIN LISPRO 12 UNITS: 100 INJECTION, SOLUTION INTRAVENOUS; SUBCUTANEOUS at 12:08

## 2022-01-01 RX ADMIN — WHITE PETROLATUM 57.7 %-MINERAL OIL 31.9 % EYE OINTMENT: at 21:00

## 2022-01-01 RX ADMIN — Medication 50 MCG/HR: at 06:43

## 2022-01-01 RX ADMIN — WHITE PETROLATUM 57.7 %-MINERAL OIL 31.9 % EYE OINTMENT: at 16:25

## 2022-01-01 RX ADMIN — POTASSIUM CHLORIDE 20 MEQ: 29.8 INJECTION, SOLUTION INTRAVENOUS at 12:23

## 2022-01-01 RX ADMIN — CALCIUM GLUCONATE 2000 MG: 20 INJECTION, SOLUTION INTRAVENOUS at 02:35

## 2022-01-01 RX ADMIN — Medication 2 CAPSULE: at 09:44

## 2022-01-01 RX ADMIN — INSULIN GLARGINE 20 UNITS: 100 INJECTION, SOLUTION SUBCUTANEOUS at 20:09

## 2022-01-01 RX ADMIN — WHITE PETROLATUM 57.7 %-MINERAL OIL 31.9 % EYE OINTMENT: at 12:12

## 2022-01-01 RX ADMIN — CEFEPIME 2000 MG: 2 INJECTION, POWDER, FOR SOLUTION INTRAVENOUS at 17:13

## 2022-01-01 RX ADMIN — INSULIN LISPRO 12 UNITS: 100 INJECTION, SOLUTION INTRAVENOUS; SUBCUTANEOUS at 04:16

## 2022-01-01 RX ADMIN — WHITE PETROLATUM 57.7 %-MINERAL OIL 31.9 % EYE OINTMENT: at 23:55

## 2022-01-01 RX ADMIN — HEPARIN SODIUM 1000 UNITS/HR: 10000 INJECTION, SOLUTION INTRAVENOUS at 06:29

## 2022-01-01 RX ADMIN — MUPIROCIN: 20 OINTMENT TOPICAL at 20:22

## 2022-01-01 RX ADMIN — FUROSEMIDE 40 MG: 10 INJECTION, SOLUTION INTRAMUSCULAR; INTRAVENOUS at 18:22

## 2022-01-01 RX ADMIN — CHLORHEXIDINE GLUCONATE 0.12% ORAL RINSE 15 ML: 1.2 LIQUID ORAL at 20:11

## 2022-01-01 RX ADMIN — WHITE PETROLATUM 57.7 %-MINERAL OIL 31.9 % EYE OINTMENT: at 04:03

## 2022-01-01 RX ADMIN — DEXTROSE MONOHYDRATE: 50 INJECTION, SOLUTION INTRAVENOUS at 10:42

## 2022-01-01 RX ADMIN — PROPOFOL 50 MCG/KG/MIN: 10 INJECTION, EMULSION INTRAVENOUS at 14:18

## 2022-01-01 RX ADMIN — LEVETIRACETAM 1000 MG: 10 INJECTION INTRAVENOUS at 09:09

## 2022-01-01 RX ADMIN — Medication 75 MG: at 21:22

## 2022-01-01 RX ADMIN — INSULIN LISPRO 16 UNITS: 100 INJECTION, SOLUTION INTRAVENOUS; SUBCUTANEOUS at 08:25

## 2022-01-01 RX ADMIN — METRONIDAZOLE 500 MG: 500 INJECTION, SOLUTION INTRAVENOUS at 09:54

## 2022-01-01 RX ADMIN — SODIUM CHLORIDE 1000 ML: 9 INJECTION, SOLUTION INTRAVENOUS at 05:51

## 2022-01-01 RX ADMIN — CEFEPIME 2000 MG: 2 INJECTION, POWDER, FOR SOLUTION INTRAVENOUS at 09:12

## 2022-01-01 RX ADMIN — PIPERACILLIN AND TAZOBACTAM 3375 MG: 3; .375 INJECTION, POWDER, FOR SOLUTION INTRAVENOUS at 08:31

## 2022-01-01 RX ADMIN — LEVETIRACETAM 1000 MG: 10 INJECTION INTRAVENOUS at 08:22

## 2022-01-01 RX ADMIN — WHITE PETROLATUM 57.7 %-MINERAL OIL 31.9 % EYE OINTMENT: at 08:33

## 2022-01-01 RX ADMIN — WHITE PETROLATUM 57.7 %-MINERAL OIL 31.9 % EYE OINTMENT: at 11:40

## 2022-01-01 RX ADMIN — ENOXAPARIN SODIUM 40 MG: 100 INJECTION SUBCUTANEOUS at 09:02

## 2022-01-01 RX ADMIN — CHLORHEXIDINE GLUCONATE 0.12% ORAL RINSE 15 ML: 1.2 LIQUID ORAL at 21:16

## 2022-01-01 RX ADMIN — WHITE PETROLATUM 57.7 %-MINERAL OIL 31.9 % EYE OINTMENT: at 11:51

## 2022-01-01 RX ADMIN — Medication 6 MCG/MIN: at 21:32

## 2022-01-01 RX ADMIN — Medication 2 CAPSULE: at 20:16

## 2022-01-01 RX ADMIN — Medication 20 MEQ: at 08:29

## 2022-01-01 RX ADMIN — INSULIN LISPRO 12 UNITS: 100 INJECTION, SOLUTION INTRAVENOUS; SUBCUTANEOUS at 23:58

## 2022-01-01 RX ADMIN — CEFEPIME 2000 MG: 2 INJECTION, POWDER, FOR SOLUTION INTRAVENOUS at 10:12

## 2022-01-01 RX ADMIN — DOCUSATE SODIUM 100 MG: 50 LIQUID ORAL at 08:29

## 2022-01-01 RX ADMIN — CHLORHEXIDINE GLUCONATE 0.12% ORAL RINSE 15 ML: 1.2 LIQUID ORAL at 09:42

## 2022-01-01 RX ADMIN — CALCIUM GLUCONATE 1000 MG: 20 INJECTION, SOLUTION INTRAVENOUS at 08:29

## 2022-01-01 RX ADMIN — PROPOFOL 50 MCG/KG/MIN: 10 INJECTION, EMULSION INTRAVENOUS at 12:58

## 2022-01-01 RX ADMIN — WHITE PETROLATUM 57.7 %-MINERAL OIL 31.9 % EYE OINTMENT: at 12:22

## 2022-01-01 RX ADMIN — PROPOFOL 50 MCG/KG/MIN: 10 INJECTION, EMULSION INTRAVENOUS at 19:14

## 2022-01-01 RX ADMIN — CISATRACURIUM BESYLATE 3 MCG/KG/MIN: 10 INJECTION INTRAVENOUS at 22:16

## 2022-01-01 RX ADMIN — SODIUM CHLORIDE 1000 ML: 9 INJECTION, SOLUTION INTRAVENOUS at 08:11

## 2022-01-01 RX ADMIN — CHLORHEXIDINE GLUCONATE 0.12% ORAL RINSE 15 ML: 1.2 LIQUID ORAL at 20:08

## 2022-01-01 RX ADMIN — INSULIN LISPRO 8 UNITS: 100 INJECTION, SOLUTION INTRAVENOUS; SUBCUTANEOUS at 08:05

## 2022-01-01 RX ADMIN — MAGNESIUM SULFATE HEPTAHYDRATE 2000 MG: 40 INJECTION, SOLUTION INTRAVENOUS at 08:26

## 2022-01-01 RX ADMIN — PROPOFOL 50 MCG/KG/MIN: 10 INJECTION, EMULSION INTRAVENOUS at 21:19

## 2022-01-01 RX ADMIN — METRONIDAZOLE 500 MG: 500 INJECTION, SOLUTION INTRAVENOUS at 10:51

## 2022-01-01 RX ADMIN — MUPIROCIN: 20 OINTMENT TOPICAL at 09:42

## 2022-01-01 RX ADMIN — Medication 75 MG: at 10:06

## 2022-01-01 RX ADMIN — WHITE PETROLATUM 57.7 %-MINERAL OIL 31.9 % EYE OINTMENT: at 20:25

## 2022-01-01 RX ADMIN — CISATRACURIUM BESYLATE 5 MCG/KG/MIN: 10 INJECTION INTRAVENOUS at 04:55

## 2022-01-01 RX ADMIN — CISATRACURIUM BESYLATE 4 MCG/KG/MIN: 10 INJECTION INTRAVENOUS at 10:42

## 2022-01-01 RX ADMIN — WHITE PETROLATUM 57.7 %-MINERAL OIL 31.9 % EYE OINTMENT: at 04:00

## 2022-01-01 RX ADMIN — SODIUM BICARBONATE 100 MEQ: 84 INJECTION INTRAVENOUS at 19:49

## 2022-01-01 RX ADMIN — CHLORHEXIDINE GLUCONATE 0.12% ORAL RINSE 15 ML: 1.2 LIQUID ORAL at 20:23

## 2022-01-01 RX ADMIN — POTASSIUM CHLORIDE 20 MEQ: 29.8 INJECTION, SOLUTION INTRAVENOUS at 17:31

## 2022-01-01 RX ADMIN — LEVETIRACETAM 1000 MG: 10 INJECTION INTRAVENOUS at 09:27

## 2022-01-01 RX ADMIN — PIPERACILLIN AND TAZOBACTAM 3375 MG: 3; .375 INJECTION, POWDER, FOR SOLUTION INTRAVENOUS at 08:18

## 2022-01-01 RX ADMIN — Medication 2 CAPSULE: at 08:39

## 2022-01-01 RX ADMIN — METHYLPREDNISOLONE SODIUM SUCCINATE 40 MG: 40 INJECTION, POWDER, FOR SOLUTION INTRAMUSCULAR; INTRAVENOUS at 07:51

## 2022-01-01 RX ADMIN — PROPOFOL 50 MCG/KG/MIN: 10 INJECTION, EMULSION INTRAVENOUS at 10:09

## 2022-01-01 RX ADMIN — HEPARIN SODIUM 4000 UNITS: 1000 INJECTION INTRAVENOUS; SUBCUTANEOUS at 06:18

## 2022-01-01 RX ADMIN — INSULIN LISPRO 4 UNITS: 100 INJECTION, SOLUTION INTRAVENOUS; SUBCUTANEOUS at 00:03

## 2022-01-01 RX ADMIN — PROPOFOL 50 MCG/KG/MIN: 10 INJECTION, EMULSION INTRAVENOUS at 22:00

## 2022-01-01 RX ADMIN — CHLORHEXIDINE GLUCONATE 0.12% ORAL RINSE 15 ML: 1.2 LIQUID ORAL at 08:28

## 2022-01-01 RX ADMIN — CHLORHEXIDINE GLUCONATE 0.12% ORAL RINSE 15 ML: 1.2 LIQUID ORAL at 20:21

## 2022-01-01 RX ADMIN — Medication 50 MCG/MIN: at 12:19

## 2022-01-01 RX ADMIN — PROPOFOL 50 MCG/KG/MIN: 10 INJECTION, EMULSION INTRAVENOUS at 08:53

## 2022-01-01 RX ADMIN — Medication 2 CAPSULE: at 20:08

## 2022-01-01 RX ADMIN — PROPOFOL 50 MCG/KG/MIN: 10 INJECTION, EMULSION INTRAVENOUS at 02:06

## 2022-01-01 RX ADMIN — METHYLPREDNISOLONE SODIUM SUCCINATE 40 MG: 40 INJECTION, POWDER, FOR SOLUTION INTRAMUSCULAR; INTRAVENOUS at 21:14

## 2022-01-01 RX ADMIN — DEXTROSE MONOHYDRATE: 50 INJECTION, SOLUTION INTRAVENOUS at 14:01

## 2022-01-01 RX ADMIN — INSULIN LISPRO 4 UNITS: 100 INJECTION, SOLUTION INTRAVENOUS; SUBCUTANEOUS at 00:12

## 2022-01-01 RX ADMIN — CHLORHEXIDINE GLUCONATE 0.12% ORAL RINSE 15 ML: 1.2 LIQUID ORAL at 08:02

## 2022-01-01 RX ADMIN — Medication 2 CAPSULE: at 08:55

## 2022-01-01 RX ADMIN — INSULIN GLARGINE 20 UNITS: 100 INJECTION, SOLUTION SUBCUTANEOUS at 20:21

## 2022-01-01 RX ADMIN — ROCURONIUM BROMIDE 57 MG: 10 INJECTION, SOLUTION INTRAVENOUS at 07:53

## 2022-01-01 RX ADMIN — DEXTROSE MONOHYDRATE: 50 INJECTION, SOLUTION INTRAVENOUS at 14:44

## 2022-01-01 RX ADMIN — LEVETIRACETAM 1000 MG: 10 INJECTION INTRAVENOUS at 08:32

## 2022-01-01 RX ADMIN — CEFEPIME 2000 MG: 2 INJECTION, POWDER, FOR SOLUTION INTRAVENOUS at 09:24

## 2022-01-01 RX ADMIN — PROPOFOL 50 MCG/KG/MIN: 10 INJECTION, EMULSION INTRAVENOUS at 18:25

## 2022-01-01 RX ADMIN — METHYLPREDNISOLONE SODIUM SUCCINATE 40 MG: 40 INJECTION, POWDER, FOR SOLUTION INTRAMUSCULAR; INTRAVENOUS at 20:07

## 2022-01-01 RX ADMIN — Medication 27 MCG/MIN: at 18:09

## 2022-01-01 RX ADMIN — Medication 75 MG: at 10:14

## 2022-01-01 RX ADMIN — MUPIROCIN: 20 OINTMENT TOPICAL at 08:32

## 2022-01-01 RX ADMIN — LEVETIRACETAM 1000 MG: 10 INJECTION INTRAVENOUS at 20:14

## 2022-01-01 RX ADMIN — CEFEPIME 2000 MG: 2 INJECTION, POWDER, FOR SOLUTION INTRAVENOUS at 10:01

## 2022-01-01 RX ADMIN — LEVETIRACETAM 1000 MG: 10 INJECTION INTRAVENOUS at 08:28

## 2022-01-01 RX ADMIN — CEFEPIME 2000 MG: 2 INJECTION, POWDER, FOR SOLUTION INTRAVENOUS at 02:13

## 2022-01-01 RX ADMIN — MUPIROCIN: 20 OINTMENT TOPICAL at 20:07

## 2022-01-01 RX ADMIN — METHYLPREDNISOLONE SODIUM SUCCINATE 40 MG: 40 INJECTION, POWDER, FOR SOLUTION INTRAMUSCULAR; INTRAVENOUS at 08:55

## 2022-01-01 RX ADMIN — INSULIN LISPRO 2 UNITS: 100 INJECTION, SOLUTION INTRAVENOUS; SUBCUTANEOUS at 17:08

## 2022-01-01 RX ADMIN — PROPOFOL 50 MCG/KG/MIN: 10 INJECTION, EMULSION INTRAVENOUS at 19:40

## 2022-01-01 RX ADMIN — PIPERACILLIN AND TAZOBACTAM 3375 MG: 3; .375 INJECTION, POWDER, FOR SOLUTION INTRAVENOUS at 00:37

## 2022-01-01 RX ADMIN — PIPERACILLIN AND TAZOBACTAM 3375 MG: 3; .375 INJECTION, POWDER, FOR SOLUTION INTRAVENOUS at 09:53

## 2022-01-01 RX ADMIN — Medication 50 MCG/HR: at 22:45

## 2022-01-01 RX ADMIN — PROPOFOL 50 MCG/KG/MIN: 10 INJECTION, EMULSION INTRAVENOUS at 10:30

## 2022-01-01 RX ADMIN — INSULIN LISPRO 8 UNITS: 100 INJECTION, SOLUTION INTRAVENOUS; SUBCUTANEOUS at 09:08

## 2022-01-01 RX ADMIN — Medication 50 MCG/HR: at 08:00

## 2022-01-01 RX ADMIN — METRONIDAZOLE 500 MG: 500 INJECTION, SOLUTION INTRAVENOUS at 02:08

## 2022-01-01 RX ADMIN — INSULIN LISPRO 4 UNITS: 100 INJECTION, SOLUTION INTRAVENOUS; SUBCUTANEOUS at 20:21

## 2022-01-01 RX ADMIN — Medication 50 MCG/HR: at 12:14

## 2022-01-01 RX ADMIN — DEXTROSE MONOHYDRATE: 50 INJECTION, SOLUTION INTRAVENOUS at 05:52

## 2022-01-01 RX ADMIN — CEFEPIME 2000 MG: 2 INJECTION, POWDER, FOR SOLUTION INTRAVENOUS at 17:53

## 2022-01-01 RX ADMIN — POTASSIUM PHOSPHATE, MONOBASIC POTASSIUM PHOSPHATE, DIBASIC 30 MMOL: 224; 236 INJECTION, SOLUTION, CONCENTRATE INTRAVENOUS at 05:53

## 2022-01-01 RX ADMIN — POTASSIUM CHLORIDE 20 MEQ: 29.8 INJECTION, SOLUTION INTRAVENOUS at 18:33

## 2022-01-01 RX ADMIN — CHLORHEXIDINE GLUCONATE 0.12% ORAL RINSE 15 ML: 1.2 LIQUID ORAL at 08:04

## 2022-01-01 RX ADMIN — INSULIN LISPRO 12 UNITS: 100 INJECTION, SOLUTION INTRAVENOUS; SUBCUTANEOUS at 12:22

## 2022-01-01 RX ADMIN — WHITE PETROLATUM 57.7 %-MINERAL OIL 31.9 % EYE OINTMENT: at 20:53

## 2022-01-01 RX ADMIN — WHITE PETROLATUM 57.7 %-MINERAL OIL 31.9 % EYE OINTMENT: at 03:59

## 2022-01-01 RX ADMIN — LEVETIRACETAM 1000 MG: 10 INJECTION INTRAVENOUS at 20:16

## 2022-01-01 RX ADMIN — WHITE PETROLATUM 57.7 %-MINERAL OIL 31.9 % EYE OINTMENT: at 16:20

## 2022-01-01 RX ADMIN — POTASSIUM CHLORIDE 20 MEQ: 29.8 INJECTION, SOLUTION INTRAVENOUS at 00:14

## 2022-01-01 RX ADMIN — INSULIN LISPRO 12 UNITS: 100 INJECTION, SOLUTION INTRAVENOUS; SUBCUTANEOUS at 16:44

## 2022-01-01 RX ADMIN — DEXTROSE MONOHYDRATE: 50 INJECTION, SOLUTION INTRAVENOUS at 02:35

## 2022-01-01 RX ADMIN — INSULIN LISPRO 4 UNITS: 100 INJECTION, SOLUTION INTRAVENOUS; SUBCUTANEOUS at 12:46

## 2022-01-01 RX ADMIN — PANTOPRAZOLE SODIUM 40 MG: 40 INJECTION, POWDER, FOR SOLUTION INTRAVENOUS at 08:30

## 2022-01-01 RX ADMIN — Medication 75 MG: at 08:02

## 2022-01-01 RX ADMIN — FUROSEMIDE 40 MG: 10 INJECTION, SOLUTION INTRAMUSCULAR; INTRAVENOUS at 08:40

## 2022-01-01 RX ADMIN — Medication 2 CAPSULE: at 20:27

## 2022-01-01 RX ADMIN — WHITE PETROLATUM 57.7 %-MINERAL OIL 31.9 % EYE OINTMENT: at 09:19

## 2022-01-01 RX ADMIN — WHITE PETROLATUM 57.7 %-MINERAL OIL 31.9 % EYE OINTMENT: at 17:08

## 2022-01-01 RX ADMIN — PROPOFOL 25 MCG/KG/MIN: 10 INJECTION, EMULSION INTRAVENOUS at 08:17

## 2022-01-01 RX ADMIN — PIPERACILLIN AND TAZOBACTAM 3375 MG: 3; .375 INJECTION, POWDER, FOR SOLUTION INTRAVENOUS at 17:21

## 2022-01-01 RX ADMIN — IOPAMIDOL 75 ML: 755 INJECTION, SOLUTION INTRAVENOUS at 05:32

## 2022-01-01 RX ADMIN — WHITE PETROLATUM 57.7 %-MINERAL OIL 31.9 % EYE OINTMENT: at 00:04

## 2022-01-01 RX ADMIN — CHLORHEXIDINE GLUCONATE 0.12% ORAL RINSE 15 ML: 1.2 LIQUID ORAL at 09:55

## 2022-01-01 RX ADMIN — SODIUM CHLORIDE 1000 ML: 9 INJECTION, SOLUTION INTRAVENOUS at 06:33

## 2022-01-01 RX ADMIN — PROPOFOL 50 MCG/KG/MIN: 10 INJECTION, EMULSION INTRAVENOUS at 19:04

## 2022-01-01 RX ADMIN — INSULIN LISPRO 12 UNITS: 100 INJECTION, SOLUTION INTRAVENOUS; SUBCUTANEOUS at 20:56

## 2022-01-01 RX ADMIN — WHITE PETROLATUM 57.7 %-MINERAL OIL 31.9 % EYE OINTMENT: at 16:39

## 2022-01-01 RX ADMIN — WHITE PETROLATUM 57.7 %-MINERAL OIL 31.9 % EYE OINTMENT: at 03:24

## 2022-01-01 RX ADMIN — DEXTROSE MONOHYDRATE: 50 INJECTION, SOLUTION INTRAVENOUS at 09:27

## 2022-01-01 RX ADMIN — Medication 2 CAPSULE: at 20:56

## 2022-01-01 RX ADMIN — LEVETIRACETAM 1000 MG: 10 INJECTION INTRAVENOUS at 20:25

## 2022-01-01 RX ADMIN — Medication 50 MCG/HR: at 20:47

## 2022-01-01 RX ADMIN — PROPOFOL 50 MCG/KG/MIN: 10 INJECTION, EMULSION INTRAVENOUS at 16:51

## 2022-01-01 RX ADMIN — INSULIN LISPRO 12 UNITS: 100 INJECTION, SOLUTION INTRAVENOUS; SUBCUTANEOUS at 12:25

## 2022-01-01 RX ADMIN — PANTOPRAZOLE SODIUM 40 MG: 40 INJECTION, POWDER, FOR SOLUTION INTRAVENOUS at 09:44

## 2022-01-01 RX ADMIN — LEVETIRACETAM 1000 MG: 10 INJECTION INTRAVENOUS at 21:37

## 2022-01-01 ASSESSMENT — PULMONARY FUNCTION TESTS
PIF_VALUE: 32
PIF_VALUE: 32
PIF_VALUE: 33
PIF_VALUE: 32
PIF_VALUE: 37
PIF_VALUE: 33
PIF_VALUE: 38
PIF_VALUE: 37
PIF_VALUE: 31
PIF_VALUE: 32
PIF_VALUE: 34
PIF_VALUE: 39
PIF_VALUE: 32
PIF_VALUE: 38
PIF_VALUE: 32
PIF_VALUE: 32
PIF_VALUE: 42
PIF_VALUE: 38
PIF_VALUE: 34
PIF_VALUE: 38
PIF_VALUE: 33
PIF_VALUE: 37
PIF_VALUE: 32
PIF_VALUE: 33
PIF_VALUE: 34
PIF_VALUE: 36
PIF_VALUE: 36
PIF_VALUE: 32
PIF_VALUE: 36
PIF_VALUE: 34
PIF_VALUE: 34
PIF_VALUE: 37
PIF_VALUE: 33
PIF_VALUE: 32
PIF_VALUE: 36
PIF_VALUE: 36
PIF_VALUE: 32
PIF_VALUE: 36
PIF_VALUE: 38
PIF_VALUE: 33
PIF_VALUE: 34
PIF_VALUE: 36
PIF_VALUE: 36
PIF_VALUE: 33
PIF_VALUE: 44
PIF_VALUE: 31
PIF_VALUE: 32
PIF_VALUE: 38
PIF_VALUE: 38
PIF_VALUE: 37
PIF_VALUE: 38
PIF_VALUE: 33
PIF_VALUE: 37
PIF_VALUE: 41
PIF_VALUE: 32
PIF_VALUE: 34
PIF_VALUE: 37
PIF_VALUE: 33
PIF_VALUE: 36
PIF_VALUE: 37
PIF_VALUE: 32
PIF_VALUE: 34
PIF_VALUE: 32
PIF_VALUE: 33
PIF_VALUE: 33
PIF_VALUE: 32
PIF_VALUE: 38
PIF_VALUE: 36
PIF_VALUE: 36
PIF_VALUE: 30
PIF_VALUE: 33
PIF_VALUE: 33
PIF_VALUE: 32
PIF_VALUE: 32
PIF_VALUE: 38
PIF_VALUE: 36
PIF_VALUE: 32
PIF_VALUE: 40
PIF_VALUE: 37
PIF_VALUE: 37
PIF_VALUE: 36
PIF_VALUE: 33
PIF_VALUE: 32
PIF_VALUE: 36
PIF_VALUE: 32
PIF_VALUE: 32
PIF_VALUE: 37
PIF_VALUE: 37
PIF_VALUE: 39
PIF_VALUE: 39
PIF_VALUE: 38
PIF_VALUE: 38
PIF_VALUE: 33
PIF_VALUE: 32
PIF_VALUE: 32
PIF_VALUE: 33
PIF_VALUE: 32
PIF_VALUE: 32
PIF_VALUE: 36
PIF_VALUE: 36
PIF_VALUE: 33
PIF_VALUE: 32
PIF_VALUE: 36
PIF_VALUE: 42
PIF_VALUE: 38
PIF_VALUE: 32
PIF_VALUE: 36
PIF_VALUE: 33
PIF_VALUE: 34
PIF_VALUE: 38
PIF_VALUE: 32
PIF_VALUE: 32
PIF_VALUE: 33
PIF_VALUE: 32
PIF_VALUE: 32
PIF_VALUE: 39
PIF_VALUE: 33
PIF_VALUE: 32
PIF_VALUE: 39
PIF_VALUE: 34
PIF_VALUE: 38
PIF_VALUE: 32
PIF_VALUE: 33
PIF_VALUE: 32
PIF_VALUE: 33
PIF_VALUE: 32
PIF_VALUE: 32
PIF_VALUE: 39
PIF_VALUE: 37
PIF_VALUE: 33
PIF_VALUE: 34
PIF_VALUE: 32
PIF_VALUE: 37
PIF_VALUE: 37
PIF_VALUE: 32
PIF_VALUE: 38
PIF_VALUE: 32
PIF_VALUE: 32
PIF_VALUE: 34
PIF_VALUE: 38
PIF_VALUE: 32
PIF_VALUE: 39
PIF_VALUE: 32
PIF_VALUE: 32
PIF_VALUE: 34
PIF_VALUE: 38
PIF_VALUE: 36
PIF_VALUE: 32
PIF_VALUE: 32
PIF_VALUE: 38
PIF_VALUE: 32
PIF_VALUE: 34
PIF_VALUE: 32
PIF_VALUE: 32
PIF_VALUE: 34
PIF_VALUE: 33
PIF_VALUE: 38
PIF_VALUE: 33
PIF_VALUE: 32
PIF_VALUE: 38
PIF_VALUE: 35
PIF_VALUE: 38
PIF_VALUE: 36
PIF_VALUE: 35
PIF_VALUE: 32
PIF_VALUE: 32
PIF_VALUE: 37
PIF_VALUE: 32
PIF_VALUE: 32
PIF_VALUE: 38
PIF_VALUE: 39
PIF_VALUE: 33
PIF_VALUE: 36
PIF_VALUE: 34
PIF_VALUE: 32
PIF_VALUE: 32
PIF_VALUE: 33
PIF_VALUE: 38
PIF_VALUE: 37
PIF_VALUE: 36
PIF_VALUE: 38
PIF_VALUE: 33
PIF_VALUE: 37
PIF_VALUE: 38
PIF_VALUE: 33
PIF_VALUE: 33
PIF_VALUE: 36
PIF_VALUE: 38
PIF_VALUE: 33
PIF_VALUE: 32
PIF_VALUE: 33
PIF_VALUE: 32
PIF_VALUE: 32
PIF_VALUE: 38
PIF_VALUE: 32
PIF_VALUE: 30
PIF_VALUE: 33
PIF_VALUE: 36
PIF_VALUE: 32
PIF_VALUE: 38
PIF_VALUE: 32
PIF_VALUE: 36
PIF_VALUE: 32
PIF_VALUE: 33
PIF_VALUE: 32
PIF_VALUE: 37
PIF_VALUE: 32
PIF_VALUE: 39
PIF_VALUE: 33
PIF_VALUE: 32
PIF_VALUE: 40
PIF_VALUE: 33
PIF_VALUE: 32
PIF_VALUE: 37
PIF_VALUE: 38
PIF_VALUE: 38
PIF_VALUE: 33
PIF_VALUE: 32
PIF_VALUE: 34
PIF_VALUE: 40
PIF_VALUE: 38
PIF_VALUE: 35
PIF_VALUE: 33
PIF_VALUE: 35
PIF_VALUE: 41
PIF_VALUE: 32
PIF_VALUE: 32
PIF_VALUE: 33
PIF_VALUE: 35
PIF_VALUE: 38
PIF_VALUE: 34
PIF_VALUE: 33
PIF_VALUE: 33
PIF_VALUE: 39
PIF_VALUE: 40
PIF_VALUE: 33
PIF_VALUE: 32
PIF_VALUE: 38
PIF_VALUE: 39
PIF_VALUE: 32
PIF_VALUE: 37
PIF_VALUE: 32
PIF_VALUE: 32
PIF_VALUE: 34
PIF_VALUE: 33
PIF_VALUE: 32
PIF_VALUE: 32
PIF_VALUE: 33
PIF_VALUE: 36
PIF_VALUE: 37
PIF_VALUE: 32
PIF_VALUE: 37
PIF_VALUE: 32
PIF_VALUE: 40
PIF_VALUE: 32
PIF_VALUE: 37
PIF_VALUE: 39
PIF_VALUE: 36
PIF_VALUE: 42
PIF_VALUE: 33
PIF_VALUE: 32
PIF_VALUE: 38
PIF_VALUE: 36
PIF_VALUE: 32
PIF_VALUE: 32
PIF_VALUE: 38
PIF_VALUE: 33
PIF_VALUE: 34
PIF_VALUE: 32
PIF_VALUE: 32
PIF_VALUE: 34
PIF_VALUE: 34
PIF_VALUE: 36
PIF_VALUE: 32
PIF_VALUE: 36
PIF_VALUE: 32
PIF_VALUE: 33
PIF_VALUE: 39
PIF_VALUE: 37
PIF_VALUE: 32
PIF_VALUE: 36
PIF_VALUE: 37
PIF_VALUE: 38
PIF_VALUE: 32
PIF_VALUE: 33
PIF_VALUE: 37
PIF_VALUE: 32
PIF_VALUE: 39
PIF_VALUE: 33
PIF_VALUE: 36
PIF_VALUE: 34
PIF_VALUE: 36
PIF_VALUE: 33
PIF_VALUE: 33
PIF_VALUE: 32
PIF_VALUE: 34
PIF_VALUE: 32
PIF_VALUE: 33
PIF_VALUE: 32
PIF_VALUE: 33
PIF_VALUE: 38
PIF_VALUE: 32
PIF_VALUE: 36
PIF_VALUE: 32
PIF_VALUE: 33
PIF_VALUE: 32
PIF_VALUE: 39
PIF_VALUE: 32
PIF_VALUE: 38
PIF_VALUE: 33
PIF_VALUE: 33
PIF_VALUE: 32
PIF_VALUE: 38
PIF_VALUE: 39
PIF_VALUE: 32
PIF_VALUE: 34
PIF_VALUE: 34
PIF_VALUE: 32
PIF_VALUE: 37
PIF_VALUE: 32
PIF_VALUE: 33
PIF_VALUE: 32
PIF_VALUE: 36
PIF_VALUE: 39
PIF_VALUE: 32
PIF_VALUE: 33
PIF_VALUE: 36
PIF_VALUE: 38
PIF_VALUE: 39
PIF_VALUE: 33
PIF_VALUE: 37
PIF_VALUE: 37
PIF_VALUE: 36
PIF_VALUE: 32
PIF_VALUE: 32
PIF_VALUE: 38
PIF_VALUE: 32
PIF_VALUE: 32
PIF_VALUE: 33
PIF_VALUE: 37
PIF_VALUE: 32
PIF_VALUE: 32
PIF_VALUE: 38
PIF_VALUE: 33
PIF_VALUE: 32
PIF_VALUE: 34
PIF_VALUE: 32
PIF_VALUE: 36
PIF_VALUE: 32
PIF_VALUE: 34
PIF_VALUE: 32
PIF_VALUE: 32
PIF_VALUE: 38
PIF_VALUE: 32
PIF_VALUE: 33
PIF_VALUE: 33
PIF_VALUE: 36
PIF_VALUE: 33
PIF_VALUE: 32
PIF_VALUE: 33
PIF_VALUE: 36
PIF_VALUE: 32
PIF_VALUE: 32
PIF_VALUE: 33
PIF_VALUE: 33
PIF_VALUE: 38
PIF_VALUE: 43
PIF_VALUE: 38
PIF_VALUE: 32
PIF_VALUE: 33
PIF_VALUE: 32
PIF_VALUE: 33
PIF_VALUE: 33
PIF_VALUE: 40
PIF_VALUE: 35
PIF_VALUE: 32
PIF_VALUE: 32
PIF_VALUE: 38
PIF_VALUE: 32
PIF_VALUE: 36
PIF_VALUE: 38
PIF_VALUE: 33
PIF_VALUE: 39
PIF_VALUE: 32
PIF_VALUE: 38
PIF_VALUE: 37
PIF_VALUE: 36
PIF_VALUE: 38
PIF_VALUE: 32
PIF_VALUE: 31
PIF_VALUE: 32
PIF_VALUE: 33
PIF_VALUE: 38
PIF_VALUE: 44
PIF_VALUE: 39
PIF_VALUE: 32
PIF_VALUE: 32
PIF_VALUE: 37
PIF_VALUE: 32
PIF_VALUE: 36
PIF_VALUE: 33
PIF_VALUE: 32
PIF_VALUE: 32
PIF_VALUE: 38
PIF_VALUE: 39
PIF_VALUE: 37
PIF_VALUE: 31
PIF_VALUE: 32
PIF_VALUE: 33
PIF_VALUE: 32
PIF_VALUE: 32
PIF_VALUE: 33
PIF_VALUE: 32
PIF_VALUE: 37
PIF_VALUE: 39
PIF_VALUE: 32
PIF_VALUE: 32
PIF_VALUE: 33
PIF_VALUE: 39
PIF_VALUE: 32
PIF_VALUE: 32
PIF_VALUE: 34
PIF_VALUE: 34
PIF_VALUE: 35
PIF_VALUE: 34
PIF_VALUE: 32
PIF_VALUE: 33
PIF_VALUE: 32
PIF_VALUE: 36
PIF_VALUE: 36
PIF_VALUE: 32
PIF_VALUE: 32
PIF_VALUE: 34
PIF_VALUE: 33
PIF_VALUE: 38
PIF_VALUE: 38
PIF_VALUE: 32
PIF_VALUE: 32
PIF_VALUE: 39
PIF_VALUE: 32
PIF_VALUE: 37
PIF_VALUE: 43
PIF_VALUE: 39
PIF_VALUE: 43
PIF_VALUE: 32
PIF_VALUE: 39
PIF_VALUE: 33
PIF_VALUE: 36
PIF_VALUE: 32
PIF_VALUE: 32
PIF_VALUE: 34
PIF_VALUE: 36
PIF_VALUE: 32
PIF_VALUE: 33
PIF_VALUE: 33
PIF_VALUE: 38
PIF_VALUE: 32
PIF_VALUE: 32
PIF_VALUE: 36
PIF_VALUE: 35
PIF_VALUE: 39
PIF_VALUE: 37
PIF_VALUE: 38
PIF_VALUE: 33
PIF_VALUE: 34
PIF_VALUE: 38
PIF_VALUE: 33
PIF_VALUE: 31
PIF_VALUE: 38
PIF_VALUE: 34
PIF_VALUE: 32
PIF_VALUE: 36
PIF_VALUE: 41
PIF_VALUE: 32
PIF_VALUE: 36
PIF_VALUE: 38
PIF_VALUE: 36
PIF_VALUE: 37
PIF_VALUE: 33
PIF_VALUE: 38
PIF_VALUE: 32
PIF_VALUE: 33
PIF_VALUE: 37
PIF_VALUE: 32
PIF_VALUE: 32
PIF_VALUE: 38
PIF_VALUE: 34
PIF_VALUE: 37
PIF_VALUE: 32
PIF_VALUE: 36
PIF_VALUE: 33
PIF_VALUE: 35
PIF_VALUE: 32
PIF_VALUE: 33
PIF_VALUE: 32
PIF_VALUE: 31
PIF_VALUE: 32
PIF_VALUE: 37
PIF_VALUE: 33
PIF_VALUE: 32
PIF_VALUE: 37
PIF_VALUE: 33
PIF_VALUE: 32
PIF_VALUE: 37
PIF_VALUE: 38
PIF_VALUE: 37
PIF_VALUE: 36
PIF_VALUE: 32
PIF_VALUE: 33
PIF_VALUE: 32
PIF_VALUE: 36
PIF_VALUE: 32
PIF_VALUE: 33
PIF_VALUE: 32
PIF_VALUE: 39
PIF_VALUE: 33
PIF_VALUE: 32
PIF_VALUE: 32
PIF_VALUE: 39
PIF_VALUE: 34
PIF_VALUE: 32
PIF_VALUE: 36
PIF_VALUE: 32
PIF_VALUE: 33
PIF_VALUE: 38
PIF_VALUE: 34
PIF_VALUE: 33
PIF_VALUE: 33
PIF_VALUE: 37
PIF_VALUE: 33
PIF_VALUE: 39
PIF_VALUE: 37
PIF_VALUE: 36
PIF_VALUE: 38
PIF_VALUE: 39
PIF_VALUE: 34
PIF_VALUE: 35
PIF_VALUE: 39
PIF_VALUE: 33
PIF_VALUE: 37
PIF_VALUE: 32
PIF_VALUE: 40
PIF_VALUE: 32
PIF_VALUE: 34
PIF_VALUE: 34
PIF_VALUE: 32
PIF_VALUE: 32
PIF_VALUE: 35
PIF_VALUE: 35
PIF_VALUE: 39
PIF_VALUE: 37
PIF_VALUE: 41
PIF_VALUE: 35
PIF_VALUE: 36
PIF_VALUE: 32
PIF_VALUE: 40
PIF_VALUE: 33
PIF_VALUE: 36
PIF_VALUE: 43
PIF_VALUE: 34
PIF_VALUE: 32
PIF_VALUE: 33
PIF_VALUE: 37
PIF_VALUE: 32
PIF_VALUE: 33
PIF_VALUE: 32
PIF_VALUE: 33
PIF_VALUE: 33
PIF_VALUE: 32
PIF_VALUE: 32
PIF_VALUE: 41
PIF_VALUE: 32
PIF_VALUE: 38
PIF_VALUE: 32
PIF_VALUE: 36
PIF_VALUE: 32
PIF_VALUE: 32
PIF_VALUE: 36
PIF_VALUE: 36
PIF_VALUE: 32
PIF_VALUE: 33
PIF_VALUE: 39
PIF_VALUE: 35
PIF_VALUE: 35
PIF_VALUE: 33
PIF_VALUE: 32
PIF_VALUE: 36
PIF_VALUE: 33
PIF_VALUE: 32
PIF_VALUE: 31
PIF_VALUE: 32
PIF_VALUE: 39
PIF_VALUE: 32
PIF_VALUE: 38
PIF_VALUE: 32
PIF_VALUE: 38
PIF_VALUE: 32
PIF_VALUE: 36
PIF_VALUE: 38
PIF_VALUE: 32
PIF_VALUE: 34
PIF_VALUE: 32
PIF_VALUE: 33
PIF_VALUE: 32
PIF_VALUE: 32
PIF_VALUE: 38
PIF_VALUE: 40
PIF_VALUE: 32
PIF_VALUE: 34
PIF_VALUE: 36
PIF_VALUE: 33
PIF_VALUE: 34
PIF_VALUE: 38
PIF_VALUE: 32
PIF_VALUE: 37
PIF_VALUE: 34
PIF_VALUE: 34
PIF_VALUE: 38
PIF_VALUE: 33
PIF_VALUE: 32
PIF_VALUE: 33
PIF_VALUE: 36
PIF_VALUE: 40
PIF_VALUE: 32
PIF_VALUE: 33
PIF_VALUE: 32
PIF_VALUE: 37
PIF_VALUE: 32
PIF_VALUE: 32
PIF_VALUE: 39
PIF_VALUE: 33
PIF_VALUE: 33
PIF_VALUE: 32
PIF_VALUE: 34
PIF_VALUE: 34
PIF_VALUE: 32
PIF_VALUE: 34
PIF_VALUE: 32
PIF_VALUE: 32
PIF_VALUE: 39
PIF_VALUE: 32
PIF_VALUE: 33
PIF_VALUE: 38
PIF_VALUE: 33
PIF_VALUE: 39
PIF_VALUE: 38
PIF_VALUE: 31
PIF_VALUE: 37
PIF_VALUE: 34
PIF_VALUE: 32
PIF_VALUE: 37
PIF_VALUE: 38
PIF_VALUE: 34
PIF_VALUE: 34
PIF_VALUE: 36
PIF_VALUE: 39
PIF_VALUE: 38
PIF_VALUE: 38
PIF_VALUE: 36
PIF_VALUE: 32
PIF_VALUE: 36
PIF_VALUE: 33
PIF_VALUE: 35
PIF_VALUE: 36
PIF_VALUE: 35
PIF_VALUE: 33
PIF_VALUE: 36
PIF_VALUE: 33
PIF_VALUE: 34
PIF_VALUE: 36
PIF_VALUE: 39
PIF_VALUE: 32
PIF_VALUE: 36
PIF_VALUE: 38
PIF_VALUE: 33
PIF_VALUE: 32
PIF_VALUE: 37
PIF_VALUE: 36
PIF_VALUE: 32
PIF_VALUE: 34
PIF_VALUE: 34
PIF_VALUE: 33
PIF_VALUE: 32
PIF_VALUE: 32
PIF_VALUE: 41
PIF_VALUE: 37
PIF_VALUE: 32
PIF_VALUE: 32
PIF_VALUE: 40
PIF_VALUE: 36
PIF_VALUE: 34
PIF_VALUE: 33
PIF_VALUE: 39
PIF_VALUE: 36
PIF_VALUE: 36
PIF_VALUE: 33
PIF_VALUE: 36
PIF_VALUE: 31
PIF_VALUE: 32
PIF_VALUE: 38
PIF_VALUE: 36
PIF_VALUE: 34
PIF_VALUE: 32
PIF_VALUE: 33
PIF_VALUE: 32
PIF_VALUE: 36
PIF_VALUE: 34
PIF_VALUE: 32
PIF_VALUE: 35
PIF_VALUE: 36
PIF_VALUE: 34
PIF_VALUE: 32
PIF_VALUE: 32
PIF_VALUE: 33
PIF_VALUE: 36
PIF_VALUE: 32
PIF_VALUE: 33
PIF_VALUE: 32
PIF_VALUE: 40
PIF_VALUE: 42
PIF_VALUE: 35
PIF_VALUE: 36
PIF_VALUE: 34
PIF_VALUE: 33
PIF_VALUE: 32
PIF_VALUE: 33
PIF_VALUE: 32
PIF_VALUE: 33
PIF_VALUE: 33
PIF_VALUE: 42
PIF_VALUE: 37
PIF_VALUE: 33
PIF_VALUE: 36
PIF_VALUE: 32
PIF_VALUE: 33
PIF_VALUE: 32
PIF_VALUE: 33
PIF_VALUE: 36
PIF_VALUE: 37
PIF_VALUE: 36
PIF_VALUE: 38
PIF_VALUE: 36
PIF_VALUE: 32
PIF_VALUE: 34
PIF_VALUE: 37
PIF_VALUE: 32
PIF_VALUE: 33
PIF_VALUE: 33
PIF_VALUE: 32
PIF_VALUE: 36
PIF_VALUE: 37
PIF_VALUE: 32
PIF_VALUE: 32
PIF_VALUE: 36
PIF_VALUE: 33
PIF_VALUE: 36
PIF_VALUE: 33
PIF_VALUE: 35
PIF_VALUE: 32
PIF_VALUE: 34
PIF_VALUE: 35
PIF_VALUE: 39
PIF_VALUE: 34
PIF_VALUE: 32
PIF_VALUE: 38
PIF_VALUE: 37
PIF_VALUE: 38
PIF_VALUE: 34
PIF_VALUE: 37
PIF_VALUE: 32
PIF_VALUE: 36
PIF_VALUE: 32
PIF_VALUE: 34
PIF_VALUE: 32
PIF_VALUE: 38
PIF_VALUE: 37
PIF_VALUE: 33
PIF_VALUE: 32
PIF_VALUE: 43
PIF_VALUE: 33
PIF_VALUE: 32
PIF_VALUE: 36
PIF_VALUE: 32
PIF_VALUE: 38
PIF_VALUE: 32
PIF_VALUE: 36
PIF_VALUE: 32
PIF_VALUE: 39
PIF_VALUE: 33
PIF_VALUE: 35
PIF_VALUE: 32
PIF_VALUE: 38
PIF_VALUE: 35
PIF_VALUE: 35
PIF_VALUE: 36
PIF_VALUE: 33
PIF_VALUE: 32
PIF_VALUE: 33
PIF_VALUE: 31
PIF_VALUE: 37
PIF_VALUE: 32
PIF_VALUE: 33
PIF_VALUE: 32
PIF_VALUE: 33
PIF_VALUE: 36
PIF_VALUE: 32
PIF_VALUE: 32
PIF_VALUE: 39
PIF_VALUE: 32
PIF_VALUE: 34
PIF_VALUE: 34
PIF_VALUE: 39
PIF_VALUE: 32
PIF_VALUE: 38
PIF_VALUE: 32
PIF_VALUE: 36
PIF_VALUE: 33
PIF_VALUE: 32
PIF_VALUE: 32
PIF_VALUE: 31
PIF_VALUE: 37
PIF_VALUE: 38
PIF_VALUE: 37
PIF_VALUE: 32
PIF_VALUE: 38
PIF_VALUE: 35
PIF_VALUE: 34
PIF_VALUE: 32
PIF_VALUE: 32
PIF_VALUE: 34
PIF_VALUE: 31
PIF_VALUE: 33
PIF_VALUE: 33
PIF_VALUE: 32
PIF_VALUE: 33
PIF_VALUE: 36
PIF_VALUE: 32
PIF_VALUE: 32
PIF_VALUE: 36
PIF_VALUE: 32
PIF_VALUE: 33
PIF_VALUE: 37
PIF_VALUE: 32
PIF_VALUE: 38
PIF_VALUE: 30
PIF_VALUE: 33
PIF_VALUE: 34
PIF_VALUE: 32
PIF_VALUE: 33
PIF_VALUE: 44
PIF_VALUE: 39
PIF_VALUE: 33
PIF_VALUE: 40
PIF_VALUE: 33
PIF_VALUE: 32
PIF_VALUE: 32
PIF_VALUE: 37
PIF_VALUE: 37
PIF_VALUE: 38
PIF_VALUE: 36
PIF_VALUE: 31
PIF_VALUE: 32
PIF_VALUE: 32
PIF_VALUE: 36
PIF_VALUE: 32

## 2022-01-01 ASSESSMENT — PAIN SCALES - GENERAL
PAINLEVEL_OUTOF10: 0

## 2022-11-19 PROBLEM — I46.9 CARDIAC ARREST (HCC): Status: ACTIVE | Noted: 2022-01-01

## 2022-11-19 PROBLEM — J69.0 ASPIRATION PNEUMONIA (HCC): Status: ACTIVE | Noted: 2022-01-01

## 2022-11-19 NOTE — H&P
HOSPITAL MEDICINE     History & Physical        Patient:  Simba Sands  YOB: 1975    MRN: [id number]     Acct: [de-identified]    PCP: FARHAN Foley CNP    Date of Admission: 11/19/2022    Date of Service:   Pt seen/examined on 11/19/2022     Admitted to Inpatient with expected LOS greater than two midnights due to medical therapy. History obtained from reviewing the medical record and patient/family Interview. Assessment:     Simba Sands is a 52 y.o. female who presented/brought to  on 11/19/2022  For cardiac arrest    Cardiopulmonary arrest with ROSC approximately after 10 minutes of resuscitation. Initial rhythm was PEA. Reported flulike symptoms  Severe acute hypoxic respiratory failure, CTA of the chest showed no pulmonary embolism  Acute toxic metabolic encephalopathy  Myoclonic jerks/seizure ? Prior history of seizures  Probable pulmonary edema  Probable aspiration pneumonia  Hyperglycemia ? History of diabetes mellitus  Nonzero troponinemia, multifactorial  Obesity    Plan:    Neuro:  -Patient currently under hypothermic protocol. No regain of consciousness. Differential diagnosis including status epilepticus, anoxic brain injury. CT scan of the head without large territorial infarct or bleeding.  -Continue sedation with propofol, continue empiric Keppra. -Neurology consulted,   -Patient is unstable for transfer to [facility] secondary to her respiratory status    Pulm:  -Severe hypoxic and hypercapnic respiratory failure respiratory failure/currently on pressure cycle ventilation. Patient is paralyzed for vent synchrony  -Continue empiric antibiotics, status post bronchoscopy and BAL  -  CVS:  -Status post cardiac arrest, cardiology team consulted. Follow echocardiogram.  Nonzero troponin, further treatment per cardiology. -Vasopressor for blood pressure support as required.       Renal:  -Monitor renal function, urine output, replete electrolyte as required  -Severe   acidosis multifactorial (metabolic and respiratory) continue resuscitative effort    GI:  GI prophylaxis, continue n.p.o. ID:  -Continue empiric antibiotics    Endocrine:  Monitor blood sugar, insulin as required. Currently receiving empiric steroid  Hem/Onc:  Monitor hemoglobin     Skin:  Skin care per ICU         Overall prognosis is poor. Plan of care discussed with critical care team        Code Status: Full Code        DVT prophylaxis: [] Lovenox                                 [] SCDs                                 [] SQ Heparin                                 [] Encourage ambulation           [x] Already on Anticoagulation     Disposition:    [x] Home       [] TCU       [] Rehab       [] Psych       [] SNF       [] [place]       [] Other-    -------------------------------------------------------------  Chief Complaint: Cardiac arrest      History Of Present Illness:       52 y.o. female who brought into the emergency room with altered mental status. Unable to obtain history/locate EMS run sheet. Collateral information obtained from chart review  -EMS was initially called for flulike symptoms. Apparently walked to the ambulance  -She became unconscious after vomiting pink frothy material.  Upon arrival to the ED she was found to be in PEA, CPR initiated, femoral line was placed. Patient was intubated. ROSC achieved approximately 10 minutes of CPR. Hypothermia protocol initiated. Patient transferred to the ICU. While in ICU patient noted to have significant hypoxemia requiring significant ventilatory changes. She underwent bedside bronchoscopy      CT of the chest showed no pulmonary embolism. Interstitial edema along with bilateral airspace disease.           Past Medical History:    Unable to obtain given the patient medical condition    Past Surgical History:      Unable to obtain given the patient medical condition    Medications Prior to Admission:      Prior to Admission medications    Not on File       Allergies:  Patient has no allergy information on record. Social History:           TOBACCO:   has no history on file for tobacco use. ETOH:   has no history on file for alcohol use. Family History:       Reviewed in detail . Positive as follows:  Unable to obtain given the patient medical condition    Diet:  Diet NPO    REVIEW OF SYSTEMS:   Pertinent positives as noted in the HPI. All other systems reviewed and negative. PHYSICAL EXAM:    BP 93/71   Pulse 94   Resp 29   Wt 207 lb 10.8 oz (94.2 kg)   SpO2 98%          Vitals:    11/19/22 0601 11/19/22 0642 11/19/22 0648 11/19/22 0652   BP:  (!) 90/48 108/69 93/71   Pulse: (!) 114 94 94 94   Resp: 23 30 30 29   SpO2: 98%      Weight:           Gen: sedated  Head: Normocephalic. Atraumatic. ENT: ETT and tube feeding in place, feeding tube in place, no oral ulceration. CVS: Nml S1S2, no murmur  RRR  Pulmomary: Reduce air entry anteriorly , on the vent  Gastrointestinal: Soft, nondistended, no grimacing, . Musculoskeletal: No edema. Warm  Neuro: Sedated, unable to assess  Psychiatry:  unable to assess       Labs:     Recent Labs     11/19/22  0501   WBC 13.9*   HGB 12.2   HCT 40.9        Recent Labs     11/19/22  0501      K 3.5      CO2 16*   BUN 17   CREATININE 0.9   CALCIUM 8.8     Recent Labs     11/19/22  0501   AST 22   ALT 17   BILIDIR <0.2   BILITOT 0.5   ALKPHOS 79     No results for input(s): INR in the last 72 hours.   Recent Labs     11/19/22  0501   TROPONINI <0.01       Urinalysis:      Lab Results   Component Value Date/Time    NITRU Negative 11/19/2022 06:00 AM    WBCUA 3-5 11/19/2022 06:00 AM    BACTERIA 1+ 11/19/2022 06:00 AM    RBCUA 0-2 11/19/2022 06:00 AM    BLOODU TRACE 11/19/2022 06:00 AM    SPECGRAV 1.045 11/19/2022 06:00 AM    GLUCOSEU Negative 11/19/2022 06:00 AM       Radiology:     CXR: I have reviewed the CXR with the following interpretation: Bilateral airspace disease. EKG:  I have reviewed the EKG with the following interpretation: Narrow complex tachycardia, no apparent ST segment elevation. CT CHEST PULMONARY EMBOLISM W CONTRAST   Final Result   1. No evidence of pulmonary embolism. 2.  Right mainstem intubation, for which retraction 3 cm is recommended. 3.  Enteric tube terminates in the stomach. 4.  Interstitial edema with small right effusion and trace left effusion. Bilateral airspace disease may represent a component of alveolar edema as   well as aspiration pneumonitis. CT HEAD WO CONTRAST   Final Result   No acute intracranial abnormality. XR CHEST PORTABLE    (Results Pending)   XR CHEST PORTABLE    (Results Pending)            Thank you FARHAN Parsons - LULI for the opportunity to be involved in this patient's care.   Narrow complex tachycardia    Electronically signed by Gretchen Braxton MD on 11/19/2022 at 8:29 AM

## 2022-11-19 NOTE — PROCEDURES
PROCEDURE:  BRONCHOSCOPY with BRONCHOALVEOLAR LAVAGE    Diagnosis: Pneumonia    ASA: 3    Sedation: Prior    Estimated Blood Loss: Minimal    DESCRIPTION OF PROCEDURE: A time out was taken. ICU sedation continued. The scope was passed with ease via the endotracheal tube. A complete airway inspection was performed. No endobronchial lesions were identified. There were minimal secretions throughout the  airways. Mucosa with irritation without active signs of bleeding. Bronchoscopy advanced to the right upper lobe. BAL completed with 60 mL in and approximately 35 mL out a pink frothy fluid. Fluid mostly consistent with CHF. Sent for culture. The patient bacterial culture fungal culture and cytology ordered for fluid.

## 2022-11-19 NOTE — CONSULTS
REASON FOR CONSULTATION/CC: Status post cardiac arrest      Consult at request of DO Raheem for  PCP: FARHAN Pagan - CNP  Established Pulmonologist: None    HISTORY OF PRESENT ILLNESS: Miguel A Marsh is a 52y.o. year old female with a history of prediabetes, seizure disorder who presents with      Patient currently intubated therefore all history per the chart. Patient presented to the emergency room secondary to cardiac arrest.  Had a lead to downtime of 10 minutes in PEA per EMS. It appears EMS was called secondary to flulike symptoms. Patient walked to the ambulance without assistance. After vomiting, the patient had a loss of consciousness. Once in the ED, was found to be in PEA with ACLS completed. At this time, it is unclear whether she coded twice or once. Documentation clearly appears to be consistent with cardiac arrest in the ER. This note may have been  transcribed using [id number] [employer]. Please disregard any translational errors. Assessment:     History of seizure  Obesity  Hypertension  History of learning disability  Prediabetes      Plan:      Hospital Day 0     Status post cardiac arrest/cardiovascular  Patient currently not responding to pain. Having movements concerning for myoclonus. Start Keppra for concern for seizure. Propofol for sedation. Continue with hypothermia but likely concerning neurologic status. Lactic acid 7.3. Echocardiogram ordered  EKG changes on initial EKG. Cardiology consulted. Heparin drip ordered. Head CT normal  CODE STATUS placed full by default. Unable to contact family at this time. Aspiration pneumonia  Copious bloody secretions. Sent for culture. Viral PCR to be sent. Assess for COVID flu and bacterial pneumonia. Agree with Zosyn. MRSA nasal probe. Worsening hypoxemia. Start  steroids for concern for pneumonitis      Mechanical ventilation  Currently significantly tachypneic.   Recruit maneuver completed. Placed on spontaneous full support 25/10. Change to [facility] plus after sedation increased. Hyperglycemia  Insulin drip    History of seizure disorder  Movements currently more consistent with myoclonus and seizures. However, given her history of seizures, transfer for continuous EEG would be reasonable. Increase sedation, start Keppra. Prophylaxis  - GI -   protonix    - DVT - heparin    - VAP - peridex  - C. Diff - culturelle   - Nasal Decolonization - Bactroban    Nutrition  - No diet orders on file    Mobility       Access  Right femoral line in place. Consider arterial line      I spent 40 minutes of critical care time with this patient excluding any procedures. This note was transcribed using [id number] [employer]. Please disregard any translational errors. Thank you for the consult    Anibal Gonzalez Pul[facility], [facility] Care  [phone]             Data:     PAST MEDICAL HISTORY:  No past medical history on file. PAST SURGICAL HISTORY:  No past surgical history on file. FAMILY HISTORY:  family history is not on file. SOCIAL HISTORY:   has no history on file for tobacco use. Scheduled Meds:   piperacillin-tazobactam  3,375 mg IntraVENous Once    sodium chloride  1,000 mL IntraVENous Once    sodium chloride  1,000 mL IntraVENous Once    aspirin  600 mg Rectal Once       Continuous Infusions:   propofol 25 mcg/kg/min (11/19/22 0654)    dextrose      insulin      heparin (PORCINE) Infusion 1,000 Units/hr (11/19/22 0654)       PRN Meds:  glucose, dextrose bolus **OR** dextrose bolus, glucagon (rDNA), dextrose    ALLERGIES:  Patient has no allergies on file. Objective:   PHYSICAL EXAM:  Blood pressure 93/71, pulse 94, resp. rate 29, weight 207 lb 10.8 oz (94.2 kg), SpO2 98 %.'  Gen: No distress. Eyes: PERRL. No sclera icterus. No conjunctival injection. Left eye with inward deviation. ENT: No discharge. Pharynx clear.  External appearance of ears and nose normal.  Neck: Trachea midline. No obvious mass. Resp: No accessory muscle use. No crackles. No wheezes. ++  rhonchi. Pink frothy fluid in circuit. CV: Regular rate. Regular rhythm. No murmur or rub. No edema. GI: Non-tender. Non-distended. No hernia. Skin: Warm, dry, normal texture and turgor. No nodule on exposed extremities. Lymph: No cervical LAD. No supraclavicular LAD. M/S: No cyanosis. No clubbing. No joint deformity. Neuro: Moves all four extremities. Jerking. Concern for myoclonus. Psych:       Data Reviewed:   LABS:  CBC:   Recent Labs     11/19/22  0501   WBC 13.9*   HGB 12.2   HCT 40.9   MCV 81.3        BMP:   Recent Labs     11/19/22  0501      K 3.5      CO2 16*   BUN 17   CREATININE 0.9     LIVER PROFILE:   Recent Labs     11/19/22  0501   AST 22   ALT 17   LIPASE 14.0   BILIDIR <0.2   BILITOT 0.5   ALKPHOS 79     PT/INR: No results for input(s): PROTIME, INR in the last 72 hours. APTT: No results for input(s): APTT in the last 72 hours. UA:  Recent Labs     11/19/22  0600   COLORU Yellow   PHUR 5.0  5.5   CLARITYU CLOUDY*   SPECGRAV 1.045   LEUKOCYTESUR Negative   UROBILINOGEN 1.0   BILIRUBINUR Negative   BLOODU TRACE*   GLUCOSEU Negative     No results for input(s): PHART, NEY7RLZ, PO2ART in the last 72 hours. Vent Information  Ventilator ID: [id number]  Ventilator Initiate: Yes  Vent Mode: /[facility]    Radiology Review:  Pertinent images / reports were reviewed as a part of this visit. CT Chest w/ contrast: No results found for this or any previous visit. CT Chest w/o contrast: No results found for this or any previous visit.       CTPA: Results for orders placed during the hospital encounter of 11/19/22    CT CHEST PULMONARY EMBOLISM W CONTRAST    Narrative  EXAMINATION:  CTA OF THE CHEST 11/19/2022 5:31 am    TECHNIQUE:  CTA of the chest was performed after the administration of intravenous  contrast.  Multiplanar reformatted images are provided for review. MIP  images are provided for review. Automated exposure control, iterative  reconstruction, and/or weight based adjustment of the mA/kV was utilized to  reduce the radiation dose to as low as reasonably achievable. COMPARISON:  None. HISTORY:  ORDERING SYSTEM PROVIDED HISTORY: Arrest  TECHNOLOGIST PROVIDED HISTORY:  Reason for exam:->Arrest  Decision Support Exception - unselect if not a suspected or confirmed  emergency medical condition->Emergency Medical Condition (MA)  Reason for Exam: Arrest    FINDINGS:  Pulmonary Arteries: Pulmonary arteries are adequately opacified for  evaluation. No evidence of intraluminal filling defect to suggest pulmonary  embolism. Main pulmonary artery is normal in caliber. Mediastinum: Endotracheal tube terminates in the proximal right mainstem  bronchus. Enteric tube terminates in the body of the stomach, coursing off  the field of view. No evidence of mediastinal lymphadenopathy. The heart  and pericardium demonstrate no acute abnormality. Lungs/pleura: Septal thickening and bilateral available opacities are noted. The opacities are most confluent in the dependent lungs. Trace left pleural  effusion and small right effusion. No pneumothorax. Upper Abdomen: Limited images of the upper abdomen are unremarkable. Soft Tissues/Bones: No acute bone or soft tissue abnormality. Impression  1. No evidence of pulmonary embolism. 2.  Right mainstem intubation, for which retraction 3 cm is recommended. 3.  Enteric tube terminates in the stomach. 4.  Interstitial edema with small right effusion and trace left effusion. Bilateral airspace disease may represent a component of alveolar edema as  well as aspiration pneumonitis. CXR PA/LAT: No results found for this or any previous visit. CXR portable: No results found for this or any previous visit.

## 2022-11-19 NOTE — PROGRESS NOTES
[facility]   Cardiology Progress Note     Admit Date: 2022     Reason for follow up: PEA arreast    HPI   History obtained from chart review. Apparently paramedics called for flu-like symptoms with mild respiratory distress. Patient walked to ambulance where she had LOC for about 10 minutes with PEA. In the ED found to be in PEA where ACLS was completed. Labs notable for profound acemedia, lactic acidosis. EKG showed sinus tachycardia with likely LVH strain vs endocardial ischemia. Resolved with subsequent ECG in better rates. Trop mildly elevated. Review of System:  Cannot be obtained given current clinical status    Physical Examination:  Vitals:    22 1115   BP:    Pulse: 76   Resp: 23   Temp:    SpO2: (!) 83%      In: 15.9 [I.V.:15.9]  Out: -    Wt Readings from Last 3 Encounters:   22 207 lb 10.8 oz (94.2 kg)     Temp  Av.7 °F (33.7 °C)  Min: 92.7 °F (33.7 °C)  Max: 92.7 °F (33.7 °C)  Pulse  Av.4  Min: 76  Max: 143  BP  Min: 49/35  Max: 143/130  SpO2  Av.8 %  Min: 77 %  Max: 100 %  FiO2   Av.1 %  Min: 60 %  Max: 100 %    Intake/Output Summary (Last 24 hours) at 2022 1123  Last data filed at 2022 0654  Gross per 24 hour   Intake 15.93 ml   Output --   Net 15.93 ml       Telemetry: Sinus rhythm   Constitutional: Intubated and sedated. Head: Normocephalic and atraumatic. Mouth/Throat: Intubated. Eyes: Conjunctivae normal    Cardiovascular: Normal rate, regular rhythm, S1&S2. Pulmonary/Chest: ventilator breath sounds. Abdominal: Soft. Neurological:sedated. Skin: Skin is warm and dry. Psychiatric: cannot be assessed. Labs:  Reviewed.    Recent Labs     22  0501 22  0830    143   K 3.5 3.9    113*   CO2 16* 20*   PHOS  --  5.1*   BUN 17 18   CREATININE 0.9 0.8     Recent Labs     22  0501   WBC 13.9*   HGB 12.2   HCT 40.9   MCV 81.3        Lab Results   Component Value Date/Time TROPONINI 0.06 11/19/2022 08:30 AM     Estimated Creatinine Clearance: 95 mL/min (based on SCr of 0.8 mg/dL). No results found for: BNP  Lab Results   Component Value Date/Time    PROTIME 17.0 11/19/2022 08:30 AM    INR 1.39 11/19/2022 08:30 AM     No results found for: CHOL, HDL, TRIG    Scheduled Meds:   chlorhexidine  15 mL Mouth/Throat BID    piperacillin-tazobactam  3,375 mg IntraVENous Q8H    lactobacillus  2 capsule Oral BID    pantoprazole  40 mg IntraVENous Daily    mupirocin   Topical BID    levETIRAcetam  1,000 mg IntraVENous Q12H    methylPREDNISolone  40 mg IntraVENous Q12H     Continuous Infusions:   propofol 50 mcg/kg/min (11/19/22 1009)    heparin (PORCINE) Infusion 1,000 Units/hr (11/19/22 0654)    norepinephrine 16 mg (11/19/22 0515)    insulin 0.5 Units/hr (11/19/22 1044)    dextrose      dextrose 5 % and 0.45 % NaCl 100 mL/hr at 11/19/22 0929    fentaNYL 50 mcg/hr (11/19/22 0800)     PRN Meds:ondansetron **OR** ondansetron, perflutren lipid microspheres, glucose, dextrose bolus **OR** dextrose bolus, glucagon (rDNA), dextrose, fentaNYL **AND** fentaNYL     Diagnostic and imaging results reviewed. Patient Active Problem List    Diagnosis Date Noted    Cardiac arrest ([facility] [address] 11/19/2022    Aspiration pneumonia ([facility] [address] 11/19/2022      Active Hospital Problems    Diagnosis Date Noted    Cardiac arrest [facility]) [I46.9] 11/19/2022     Priority: Medium    Aspiration pneumonia ([facility] [address] [J69.0] 11/19/2022     Priority: Medium       Assessment and Plan  PEA arrest  Aspiration pneumonia  Hypoxemia  Acidemia    No strips or evidence of arrest from an arrhythmia. Most likely respiratory. Currently sedated. No purposeful movement per nurse.    - get echocardiogram to assess wall motion. - on hypothermia protocol.    - monitor electrolytes   - awaiting neurological assessment.        Lisandro Bird MD  Cardiac Electrophysiology  [facility]

## 2022-11-19 NOTE — CONSULTS
Clinical Pharmacy Note  Heparin Dosing Consult    Miriam Santiago is a 52 y.o. female ordered heparin per low dose nomogram by Dr. Francois Avery. No results found for: APTT  Lab Results   Component Value Date/Time    HGB 12.2 11/19/2022 05:01 AM    HCT 40.9 11/19/2022 05:01 AM     11/19/2022 05:01 AM       Ht Readings from Last 1 Encounters:   No data found for Ht        Wt Readings from Last 1 Encounters:   11/19/22 207 lb 10.8 oz (94.2 kg)        Assessment/Plan:  Initial bolus: 4000 units  Initial infusion rate: 1000 units/hr  Next aPTT: 1300 11/19/22    Pharmacy will continue to monitor adjust heparin based on aPTT results using nomogram below:     CAD/STEMI/NSTEMI/UA/AFIB Heparin Nomogram     Initial Bolus: 60 units/kg Max Bolus: 4,000 units       Initial Rate: 12 units/kg/hr Max Initial Rate: 1,000 units/hr     aPTT < 59    Heparin 60 units/kg bolus Increase infusion by 4 units/kg/hr        (maximum 4,000 units)   aPTT 59.1-72.9 Heparin 30 units/kg bolus Increase infusion by 2 units/kg/hr        (maximum 2,000 units)   aPTT     No bolus   No change   aPTT 102.1-109 No bolus   Decrease infusion by 1 units/kg/hr   aPTT 109.1-122.9 No bolus     Decrease infusion by 2 units/kg/hr   aPTT > 123    Hold heparin for 1 hour Decrease infusion by 3 units/kg/hr     Obtain aPTT 6 hours after initial bolus and 6 hours after any dose change until two consecutive therapeutic aPTTs are achieved - then daily.     Sukhwinder Carrera, PharmD

## 2022-11-19 NOTE — PROGRESS NOTES
Continues have worsening hypoxemia. Patient given a dose of paralytic increase sedation. Recurrent maneuver completed again. Change to [facility] VC. Extended time to 1.2 seconds. Tidal volumes 300 and respiratory rate increased to 24. No air trapping. PEEP 15.  Stat chest x-ray ordered. Bedside ultrasound completed. Lung sliding noted on both lungs. Saturations slowly improving to 87%.

## 2022-11-19 NOTE — CONSULTS
Neurology    Came to see pt, but she is sedated, paralyzed, and on hypothermia protocol. Will defer completion of consult until tomorrow, ideally after rewarming if possible.

## 2022-11-19 NOTE — PROCEDURES
Arterial Catheter Insertion Procedure Note  Consent: Unable to be obtained due to the emergent nature of this procedure. Procedure: Insertion of Arterial Catheter    Indications:   Frequent Blood Gases, Hypotension, Shock    Estimated Blood Loss: Minimal    Procedure Details   Informed consent was obtained for the procedure, including sedation. Risks of  hemorrhage, arrhythmia, infection and adverse drug reaction were discussed. Ultrasound used and left axillary artery was noted to be patent. Under sterile conditions the skin above the left axillary artery was prepped with betadine and covered with a sterile drape with gown, mask, gloves . Alem Balling Local anesthesia was applied to the skin and subcutaneous tissues. A 22-gauge needle was inserted into the left axillary artery under active ultrasound guidance. A guide wire was then passed easily through the catheter which was then advanced with no resistance. Good pulsatile blood returned. The catheter was sutured into place. Findings: There were no changes to vital signs. Patient did tolerate procedure well. Total time of procedure 15 minutes. [id number]      While completing the procedure, the family called. ED updated as much as possible on speaker phone. Unfortunately speaker phone disconnected sometime during the conversation. Disconnection was silent so therefore unknown known how much information they received from me.

## 2022-11-19 NOTE — ED NOTES
[facility] arrived with 52 yof, cardiac arrest: PEA. EMS stated they were called for flu-like symptoms. Pt walked to ambulance without assistance. Once in the ambulance began vomiting pink froth and lost consciousness. Pt transferred to ED bed, 14. MD at bedside. Initial rhythm PEA @ 0451. Pt noted with IO left humeral head. 1 mg Epi administered IOP and flushed @ 0451. Pt intubated by Dory Kincaid MD with 7.5 ETT, 25@ teeth. Capnography color change noted. RT at bedside to manually ventilate and set up vent. 1 mg Epi administered IOP and flushed @ 0454. Blood glucose 198 @ 0454. Rhythm change noted, sinus tach @ 0455. A-fib RVR. Initial /130 @ 0458. Additional 22G IV established in right hand @ 0501. Dory Kincaid MD established triple lumen fem access @ 0502. OG placed @ 0502, 47 at the lips. Immediate pink frothy return noted in OG. Levo at 10mcg started at 0511. Penn placed at 0516, yellow clear return noted. RT and 2 RN s transported pt to CT and returned to room. Radiology advised to pull back ETT by 3 @ 0555. RT completed. Labs collected and medications initiated. Cooling measures with ice packs and 1L cooled saline @ 0624.       Elaine Zelaya RN  11/19/22 9102

## 2022-11-19 NOTE — PLAN OF CARE
Problem: Discharge Planning  Goal: Discharge to home or other facility with appropriate resources  Outcome: Not Progressing     Problem: Nutrition Deficit:  Goal: Optimize nutritional status  Outcome: Not Progressing     Problem: Pain  Goal: Verbalizes/displays adequate comfort level or baseline comfort level  Outcome: Not Progressing     Problem: Safety - Violent/Self-destructive Restraint  Goal: Remains free of injury from restraints (Restraint for Violent/Self-Destructive Behavior)  Description: INTERVENTIONS:  1. Determine that de-escalation and other, less restrictive measures have been tried or would not be effective before applying the restraint  2. Identify and document the criteria for restraint  3. Evaluate the patient's condition at the time of restraint application  4. Inform patient/family regarding the reason for restraint/seclusion  5. Q2H: Monitor comfort, nutrition and hydration needs  6. Q15M: Perform safety checks including skin, circulation, sensory, respiratory and psychological status  7. Ensure continuous observation  8. Identify and implement measures to help patient regain control, assess readiness for release and initiate progressive release per policy  Outcome: Progressing     Problem: Safety - Medical Restraint  Goal: Remains free of injury from restraints (Restraint for Interference with Medical Device)  Description: INTERVENTIONS:  1. Determine that other, less restrictive measures have been tried or would not be effective before applying the restraint  2. Evaluate the patient's condition at the time of restraint application  3. Inform patient/family regarding the reason for restraint  4. Q2H: Monitor safety, psychosocial status, comfort, nutrition and hydration  Outcome: Progressing     Problem: Skin/Tissue Integrity  Goal: Absence of new skin breakdown  Description: 1. Monitor for areas of redness and/or skin breakdown  2. Assess vascular access sites hourly  3.   Every 4-6 hours minimum:  Change oxygen saturation probe site  4. Every 4-6 hours:  If on nasal continuous positive airway pressure, respiratory therapy assess nares and determine need for appliance change or resting period.   Outcome: Progressing     Problem: Discharge Planning  Goal: Discharge to home or other facility with appropriate resources  Outcome: Not Progressing     Problem: Nutrition Deficit:  Goal: Optimize nutritional status  Outcome: Not Progressing     Problem: Pain  Goal: Verbalizes/displays adequate comfort level or baseline comfort level  Outcome: Not Progressing

## 2022-11-19 NOTE — ED PROVIDER NOTES
I PERSONALLY SAW THE PATIENT AND PERFORMED A SUBSTANTIVE PORTION OF THE VISIT INCLUDING ALL ASPECTS OF THE MEDICAL DECISION MAKING PROCESS. [address]      Pt Name: Wendy Barajas  MRN: [id number]  Claudia 1975  Date of evaluation: 11/19/2022  Provider: Sebastian Mercado MD    CHIEF COMPLAINT     Cardiac arrest    HISTORY OF PRESENT ILLNESS    Wendy Barajas is a 52 y.o. female who presents to the emergency department with cardiac arrest.  Patient presents status post cardiac arrest.  Witnessed arrest.  Downtime 10 minutes. PEA arrest.  No airway on arrival per EMS. History otherwise limited as patient is unresponsive on arrival    Nursing Notes were reviewed. Including nursing noted for FM, Surgical History, Past Medical History, Social History, vitals, and allergies; agree with all. REVIEW OF SYSTEMS       Review of Systems   Unable to perform ROS: Mental status change     PAST MEDICAL HISTORY   No past medical history on file. SURGICAL HISTORY     No past surgical history on file. CURRENT MEDICATIONS       Previous Medications    No medications on file       ALLERGIES     Patient has no allergy information on record. FAMILY HISTORY      No family history on file. SOCIAL HISTORY       Social History     Socioeconomic History    Marital status: Single       PHYSICAL EXAM       ED Triage Vitals   BP Temp Temp src Heart Rate Resp SpO2 Height Weight   11/19/22 0458 -- -- 11/19/22 0458 11/19/22 0458 11/19/22 0547 -- 11/19/22 0546   (!) 143/130   (!) 143 11 100 %  207 lb 10.8 oz (94.2 kg)       Physical Exam  Vitals and nursing note reviewed. Constitutional:       General: She is in acute distress. Appearance: She is well-developed. She is ill-appearing. She is not toxic-appearing or diaphoretic. HENT:      Head: Normocephalic and atraumatic.       Right Ear: External ear normal.      Left Ear: External ear normal. Eyes:      General:         Right eye: Discharge present. Left eye: No discharge. Conjunctiva/sclera: Conjunctivae normal.      Comments: Fixed and dilated pupils   Neck:      Vascular: No carotid bruit. Cardiovascular:      Rate and Rhythm: Tachycardia present. Heart sounds: No murmur heard. Pulmonary:      Breath sounds: Rhonchi present. Comments: Mechanical breath sounds from ET tube  Abdominal:      General: Bowel sounds are normal.      Palpations: Abdomen is soft. Genitourinary:     Comments: Deferred  Musculoskeletal:         General: No deformity or signs of injury. Lymphadenopathy:      Cervical: No cervical adenopathy. Skin:     General: Skin is warm. Findings: No erythema or rash. Neurological:      Mental Status: She is disoriented. Motor: No atrophy or abnormal muscle tone.       Comments: Biting at tube otherwise no neurological function at this time       DIAGNOSTIC RESULTS     EKG: All EKG's are interpreted by the Emergency Department Physician who either signs or Co-signs this chart in the absence of acardiologist.    EG shows depressions in the inferior lateral and anterior septal leads with mild elevations in aVR nonspecific other EKG changes no ectopy sinus tachycardia    RADIOLOGY:   Non-plain film images such as CT, Ultrasoundand MRI are read by the radiologist. Plain radiographic images are visualized and preliminarily interpreted by the emergency physician with the below findings:    CT concerning for aspiration pneumonia  CT head shows no acute pathology    ED BEDSIDE ULTRASOUND:   Performed by ED Physician - none    LABS:  Labs Reviewed   CBC WITH AUTO DIFFERENTIAL - Abnormal; Notable for the following components:       Result Value    WBC 13.9 (*)     MCH 24.2 (*)     MCHC 29.7 (*)     RDW 16.8 (*)     Neutrophils Absolute 9.4 (*)     All other components within normal limits   BASIC METABOLIC PANEL W/ REFLEX TO MG FOR LOW K - Abnormal; Notable for the following components:    CO2 16 (*)     Anion Gap 22 (*)     Glucose 322 (*)     All other components within normal limits   BRAIN NATRIURETIC PEPTIDE - Abnormal; Notable for the following components:    Pro-BNP 1,088 (*)     All other components within normal limits   HEPATIC FUNCTION PANEL - Abnormal; Notable for the following components: Total Protein 6.0 (*)     Albumin 3.3 (*)     All other components within normal limits   MAGNESIUM - Abnormal; Notable for the following components:    Magnesium 2.70 (*)     All other components within normal limits   POCT GLUCOSE - Abnormal; Notable for the following components:    POC Glucose 198 (*)     All other components within normal limits   COVID-19, RAPID   RAPID INFLUENZA A/B ANTIGENS   CULTURE, BLOOD 1   CULTURE, BLOOD 1   TROPONIN   LIPASE   URINALYSIS WITH REFLEX TO CULTURE   URINE DRUG SCREEN   LACTIC ACID   BLOOD GAS, VENOUS   APTT   POCT GLUCOSE       All other labs were withinnormal range or not returned as of this dictation. EMERGENCY DEPARTMENT COURSE and DIFFERENTIAL DIAGNOSIS/MDM:     PMH, Surgical Hx, FH, Social Hx reviewed by myself (ETOH usage, Tobacco usage, Drug usage reviewed by myself, no pertinent Hx)- No Pertinent Hx     Old records were reviewed by me     MDM 49-year-old with cardiac arrest.  PEA arrest.  Witnessed. ROSC obtained. Abnormal EKG. Cardiology Dr. Lizzie Beard consulted. Hold off on Cath Lab for now. Aspirin and heparin was initiated. Antibiotics were started for aspiration concern. Propofol for sedation. Hypothermia protocol initiated as patient has poor neurological function at this time. Patient will be admitted to the ICU for further care. FULL CODE  Labs-  As above  Diagnostic findings As above  Medications As above  Consults Cardiology   Disposition ICU    I PERSONALLY SAW THE PATIENT AND PERFORMED A SUBSTANTIVE PORTION OF THE VISIT INCLUDING ALL ASPECTS OF THE MEDICAL DECISION MAKING PROCESS.     The primary clinician of record Benjamin Wallace     CRITICAL CARE TIME   Total Critical Caretime was 99 minutes, excluding separately reportable procedures. There was a high probability of clinically significant/life threatening deterioration in the patient's condition which required my urgent intervention. CRITICAL CARE  I personally saw the patient and independently provided 99 minutes of non-concurrent critical care out of the total shared critical care time provided. This excludes seperately billable procedures. Critical care time was provided for patient as above that required close evaluation and/or intervention with concern for potential patient decompensation. PROCEDURES:  Intubation    Date/Time: 11/19/2022 6:15 AM  Performed by: Benjamin Wallace MD  Authorized by: Benjamin Wallace MD     Consent:     Consent obtained:  Emergent situation  Universal protocol:     Patient identity confirmed:  Arm band  Pre-procedure details:     Indication: failure to oxygenate and failure to protect airway      Patient status:  Unresponsive    Mouth opening - incisor distance:  3 or more finger widths    Hyoid-mental distance: 3 or more finger widths      Hyoid-thyroid distance: 2 or more finger widths      Mallampati score: I    Obstruction: none      Neck mobility: normal      Pharmacologic strategy: none      Induction agents:  None    Paralytics:  None  Procedure details:     Preoxygenation:  Bag valve mask    CPR in progress: yes      Intubation method:  Oral    Intubation technique: direct      Laryngoscope blade:   Mac 3    Grade view: I      Tube size (mm):  7.5    Tube type:  Cuffed    Number of attempts:  1  Placement assessment:     Tube secured with:  ETT kurtz    Breath sounds:  Equal    Placement verification: chest rise, colorimetric ETCO2 and direct visualization    Post-procedure details:     Procedure completion:  Tolerated  Central Line    Date/Time: 11/19/2022 6:22 AM  Performed by: Benjamin Wallace MD  Authorized by: Priya Rodriguez MD     Consent:     Consent obtained:  Emergent situation  Universal protocol:     Patient identity confirmed:  Arm band  Pre-procedure details:     Indication(s): central venous access      Hand hygiene: Hand hygiene performed prior to insertion      Sterile barrier technique: All elements of maximal sterile technique followed      Skin preparation agent: Skin preparation agent completely dried prior to procedure    Sedation:     Sedation type: Moderate sedation  Anesthesia:     Anesthesia method:  Local infiltration    Local anesthetic:  Lidocaine 1% w/o epi  Procedure details:     Location:  R femoral    Patient position:  Supine    Procedural supplies:  Triple lumen    Landmarks identified: yes      Ultrasound guidance: yes      Ultrasound guidance timing: real time      Sterile ultrasound techniques: Sterile gel and sterile probe covers were used      Number of attempts:  1    Successful placement: yes    Post-procedure details:     Post-procedure:  Dressing applied    Assessment:  Blood return through all ports    Procedure completion:  Tolerated  Comments:       10 cc EBL      FINAL IMPRESSION      1. Cardiac arrest ([address]    2. Aspiration pneumonia, unspecified aspiration pneumonia type, unspecified laterality, unspecified part of lung ([address]    3. Septicemia ([address]    4.  Abnormal EKG          DISPOSITION/PLAN   DISPOSITION Decision To Admit 11/19/2022 05:59:20 AM    (Please note that portions ofthis note were completed with a voice recognition program.  Efforts were made to edit the dictations but occasionally words are mis-transcribed.)    Priya Rodriguez MD(electronically signed)  Attending Emergency Physician         Priya Rodriguez MD  11/19/22 3021

## 2022-11-19 NOTE — ED NOTES
Report given to floor nurse. Opportunity to answer questions. Nurse okay to transport to floor.       Virgil Caldwell RN  11/19/22 2477

## 2022-11-19 NOTE — PROGRESS NOTES
Pharmacy Medication Reconciliation Note     List of medications patient is currently taking is IN PROGRESS. Source of information:   1. EMR      Notes regarding home medications:   1. Updated medication list based on PCP visit 5/2022. No other information available at this time.     Angélica Kilpatrick PharmD, BCPS  11/19/2022  2:02 PM

## 2022-11-19 NOTE — ED NOTES
Charlotte Be cultures from the right [facility] to attempt to avoid the multiple fluids running at different sites on pt.       Xochilt Plater  11/19/22 8603

## 2022-11-19 NOTE — PROGRESS NOTES
Comprehensive Nutrition Assessment    Type and Reason for Visit:  Initial    Nutrition Recommendations/Plan:   Will provide TF recommendations when/if needed      Malnutrition Assessment:  Malnutrition Status:  Insufficient data (11/19/22 3869)      Nutrition Assessment:    Pt seen for new vent. Pt with cardiac arrest, currently intubated and sedated with hypothermic protocol. Also with seizure activity, apsiration PNA, hyperglycemia (on insulin drip). Requiring levo, map currently 79. Propofol at 14.1 ml/hr providing 372 kcal from lipids. Will provide TF recs when/if needed. Nutrition Related Findings:    Reviewed labs Wound Type: None       Current Nutrition Intake & Therapies:    Average Meal Intake: NPO  Average Supplements Intake: NPO  Diet NPO    Anthropometric Measures:  Height: 5' 3\" (160 cm)  Ideal Body Weight (IBW): 115 lbs (52 kg)       Current Body Weight: 207 lb (93.9 kg), 180 % IBW.     Current BMI (kg/m2): 36.7                          BMI Categories: Obese Class 2 (BMI 35.0 -39.9)    Estimated Daily Nutrient Needs:  Energy Requirements Based On: Kcal/kg  Weight Used for Energy Requirements: Current  Energy (kcal/day): 9498-4823 kcal (11-14 kcal/kg ABW)  Weight Used for Protein Requirements: Ideal  Protein (g/day): 104 gm (2 gm/kg IBW)  Method Used for Fluid Requirements: Other (Comment)  Fluid (ml/day): per MD    Nutrition Diagnosis:   Inadequate oral intake related to impaired respiratory function as evidenced by NPO or clear liquid status due to medical condition, intubation    Nutrition Interventions:   Food and/or Nutrient Delivery: Continue NPO  Nutrition Education/Counseling: No recommendation at this time  Coordination of Nutrition Care: Continue to monitor while inpatient       Goals:     Goals: Initiate nutrition support, by next RD assessment       Nutrition Monitoring and Evaluation:   Behavioral-Environmental Outcomes: None Identified  Food/Nutrient Intake Outcomes: Enteral Nutrition Intake/Tolerance  Physical Signs/Symptoms Outcomes: Biochemical Data, Hemodynamic Status, Nutrition Focused Physical Findings, Skin, Weight    Discharge Planning:    No discharge needs at this time     Ibis Holguin, [address], [place]  Contact: [phone]

## 2022-11-19 NOTE — CONSULTS
HPI   History obtained from chart review. Apparently paramedics called for flu-like symptoms with mild respiratory distress. Patient walked to ambulance where she had LOC for about 10 minutes with PEA. In the ED found to be in PEA where ACLS was completed. Labs notable for profound acemedia, lactic acidosis. EKG showed sinus tachycardia with likely LVH strain vs endocardial ischemia. Resolved with subsequent ECG in better rates. Trop mildly elevated. Review of System:  Cannot be obtained given current clinical status     Physical Examination:      Vitals:     22 1115   BP:     Pulse: 76   Resp: 23   Temp:     SpO2: (!) 83%      In: 15.9 [I.V.:15.9]  Out: -        Wt Readings from Last 3 Encounters:   22 207 lb 10.8 oz (94.2 kg)      Temp  Av.7 °F (33.7 °C)  Min: 92.7 °F (33.7 °C)  Max: 92.7 °F (33.7 °C)  Pulse  Av.4  Min: 76  Max: 143  BP  Min: 49/35  Max: 143/130  SpO2  Av.8 %  Min: 77 %  Max: 100 %  FiO2   Av.1 %  Min: 60 %  Max: 100 %     Intake/Output Summary (Last 24 hours) at 2022 1123  Last data filed at 2022 0654      Gross per 24 hour   Intake 15.93 ml   Output --   Net 15.93 ml         Telemetry: Sinus rhythm   Constitutional: Intubated and sedated. Head: Normocephalic and atraumatic. Mouth/Throat: Intubated. Eyes: Conjunctivae normal    Cardiovascular: Normal rate, regular rhythm, S1&S2. Pulmonary/Chest: ventilator breath sounds. Abdominal: Soft. Neurological:sedated. Skin: Skin is warm and dry. Psychiatric: cannot be assessed. Labs:  Reviewed.         Recent Labs     22  0501 22  0830    143   K 3.5 3.9    113*   CO2 16* 20*   PHOS  --  5.1*   BUN 17 18   CREATININE 0.9 0.8          Recent Labs     22  0501   WBC 13.9*   HGB 12.2   HCT 40.9   MCV 81.3               Lab Results   Component Value Date/Time     TROPONINI 0.06 2022 08:30 AM      Estimated Creatinine Clearance: 95 mL/min (based on SCr of 0.8 mg/dL). No results found for: BNP        Lab Results   Component Value Date/Time     PROTIME 17.0 11/19/2022 08:30 AM     INR 1.39 11/19/2022 08:30 AM      No results found for: CHOL, HDL, TRIG     Scheduled Meds:  Scheduled Medications    chlorhexidine  15 mL Mouth/Throat BID    piperacillin-tazobactam  3,375 mg IntraVENous Q8H    lactobacillus  2 capsule Oral BID    pantoprazole  40 mg IntraVENous Daily    mupirocin   Topical BID    levETIRAcetam  1,000 mg IntraVENous Q12H    methylPREDNISolone  40 mg IntraVENous Q12H         Continuous Infusions:  Infusions Meds    propofol 50 mcg/kg/min (11/19/22 1009)    heparin (PORCINE) Infusion 1,000 Units/hr (11/19/22 0654)    norepinephrine 16 mg (11/19/22 0515)    insulin 0.5 Units/hr (11/19/22 1044)    dextrose      dextrose 5 % and 0.45 % NaCl 100 mL/hr at 11/19/22 0929    fentaNYL 50 mcg/hr (11/19/22 0800)         PRN Meds:  PRN Medications   ondansetron **OR** ondansetron, perflutren lipid microspheres, glucose, dextrose bolus **OR** dextrose bolus, glucagon (rDNA), dextrose, fentaNYL **AND** fentaNYL         Diagnostic and imaging results reviewed. Patient Active Problem List     Diagnosis Date Noted    Cardiac arrest ([facility] 11/19/2022    Aspiration pneumonia ([facility] 11/19/2022            Active Hospital Problems     Diagnosis Date Noted    Cardiac arrest [facility]) [I46.9] 11/19/2022       Priority: Medium    Aspiration pneumonia ([facility] [J69.0] 11/19/2022       Priority: Medium         Assessment and Plan  PEA arrest  Aspiration pneumonia  Hypoxemia  Acidemia     No strips or evidence of arrest from an arrhythmia. Most likely respiratory. Currently sedated. No purposeful movement per nurse.               - get echocardiogram to assess wall motion. - on hypothermia protocol.               - monitor electrolytes              - awaiting neurological assessment.          Lisandro Bird MD  Cardiac Electrophysiology  [facility] [place]

## 2022-11-19 NOTE — PROGRESS NOTES
Clinical Pharmacy Note  Heparin Dosing       Lab Results   Component Value Date/Time    APTT 71.4 11/19/2022 02:08 PM     Lab Results   Component Value Date/Time    HGB 12.2 11/19/2022 05:01 AM    HCT 40.9 11/19/2022 05:01 AM     11/19/2022 05:01 AM    INR 1.39 11/19/2022 08:30 AM       Current Infusion Rate: 1000 units/hr    Plan:  Rate: increase to 1100 units/hr  Next aPTT: 2200 11/19/22    Pharmacy will continue to monitor and adjust based on aPTT results.

## 2022-11-19 NOTE — CONSULTS
Neurology Consult Note    Dr Gretchen Braxton MD asked me to see Maryellen Gorman in consultation   for evaluation of hypoxia and myoclonic jerking    HPI:  53 yo woman called EMS for flu-like symptoms, lost consciousness after vomiting pink froth, found to be in PEA, and CPR initiated. Per chart, ROSC achieved after about 10m of CPR, shortly before 5am Saturday. Hypothermia initiated. Myoclonic jerking was noted yesterday morning, but none since. PMHx:  By report, seizure disorder. FHx:     unknown  SocHx:  No known smoking or EtOH    Meds: Reviewed    Allergies:   Not on File    Review of Systems:  unavailable    Vitals:  BP (!) 95/51   Pulse 72   Temp (!) 93.4 °F (34.1 °C) (Bladder)   Resp 24   Ht 5' 3\" (1.6 m)   Wt 207 lb 10.8 oz (94.2 kg)   SpO2 (!) 89%   BMI 36.79 kg/m²     Exam:  Pt is lying prone in bed. Rewarming in process but has not completed. Paralytic agent (nimbex), fentanyl, propofol, and levophed are running. Lab and Imaging Data:  CT head (Nov 19) with no acute intracranial abn. Impression:  Incomplete neurologic assessment. Likely hypoxic brain injury. Myoclonic jerking yesterday prompted concern for possible seizures. No myoclonic jerking since start of hypothermia, sedation, and paralytic. Recommendations:  Unable yet to provide prognosis. Will ask the weekday neurology team to follow.     ___  Melida Vallejo MD PhD

## 2022-11-20 NOTE — PROGRESS NOTES
4 Eyes Skin Assessment     NAME:  Rosendo Arambula  YOB: 1975  MEDICAL RECORD NUMBER:  [id number]    The patient is being assessed for  Shift Handoff    I agree that One RN have performed a thorough Head to Toe Skin Assessment on the patient. ALL assessment sites listed below have been assessed. Areas assessed by both nurses:    Head, Face, Ears, Shoulders, Back, Chest, Arms, Elbows, Hands, Sacrum. Buttock, Coccyx, Ischium, and Legs. Feet and Heels        Does the Patient have a Wound?  No noted wound(s)       [place] Prevention initiated by RN: Yes   Wound Care Orders initiated by RN: NA    Pressure Injury (Stage 3,4, Unstageable, DTI, NWPT, and Complex wounds) if present place referral order by RN under : NA    New and Established Ostomies, if present place, referral order under : NA      Nurse 1 eSignature: Electronically signed by Raul Bailey RN on 11/20/22 at 6:37 PM EST    **SHARE this note so that the co-signing nurse is able to place an eSignature**    Nurse 2 eSignature: Electronically signed by Willie Lagos RN on 11/21/22 at 4:27 AM EST Writer called Lab in regards to no-one here yet from Lab to draw Pt's blood. He will attempt to contact the Phlebotomist. Informed him that the Pt is a \"Code S\" and that we need to have things done in a timely manner.       Choco Gallego RN  02/17/18 400 [facility] Christiano Michelle RN  02/17/18 1916

## 2022-11-20 NOTE — PROGRESS NOTES
Clinical Pharmacy Note  Heparin Dosing       Lab Results   Component Value Date/Time    APTT 104.9 11/20/2022 05:45 AM     Lab Results   Component Value Date/Time    HGB 12.5 11/20/2022 05:45 AM    HCT 39.8 11/20/2022 05:45 AM     11/20/2022 05:45 AM    INR 1.39 11/19/2022 08:30 AM       Current Infusion Rate: 1000 units/hr    Plan:  Rate: 810 units/hr  Next aPTT: 1400 11/20/22    Pharmacy will continue to monitor and adjust based on aPTT results.

## 2022-11-20 NOTE — PROGRESS NOTES
Cardiac Electrophysiology Progress Note     Admit Date: 2022     Reason for follow up: PEA arrest    HPI and Interval History:   Patient seen and examined. Clinical notes reviewed. Telemetry reviewed. Rewarming underway  No major events overnight. Review of System:  Cannot be reviewed given clinical status    Physical Examination:  Vitals:    22 0821   BP:    Pulse: 67   Resp: 15   Temp:    SpO2: 95%        Intake/Output Summary (Last 24 hours) at 2022 0824  Last data filed at 2022 0715  Gross per 24 hour   Intake 4010.61 ml   Output 1270 ml   Net 2740.61 ml     In: 4010.6 [I.V.:3580.8; NG/GT:30]  Out: 1495    Wt Readings from Last 3 Encounters:   22 207 lb 10.8 oz (94.2 kg)     Temp  Av.6 °F (33.1 °C)  Min: 91 °F (32.8 °C)  Max: 92.7 °F (33.7 °C)  Pulse  Av.7  Min: 65  Max: 97  BP  Min: 86/62  Max: 120/68  SpO2  Av.1 %  Min: 72 %  Max: 96 %  FiO2   Av %  Min: 100 %  Max: 100 %    Telemetry: Sinus rhythm   Constitutional: Intubated and sedated. Head: Normocephalic and atraumatic. Mouth/Throat: Intubated. Eyes: Conjunctivae normal    Cardiovascular: Normal rate, regular rhythm, S1&S2. Pulmonary/Chest: ventilator breath sounds. Abdominal: Soft. Neurological:sedated. Skin: Skin is warm and dry. Psychiatric: cannot be assessed. Labs, diagnostic and imaging results reviewed. Reviewed.    Recent Labs     22  1408 22  1855 22  0055 22  0545    136 137 140   K 3.8 3.5 3.4* 3.5    112* 109 104   CO2 17* 17* 21 22   PHOS 3.8  --  3.7 3.9   BUN 17 17 15 13   CREATININE 0.8 0.8 0.7 0.6     Recent Labs     22  0501 22  0545   WBC 13.9* 7.1   HGB 12.2 12.5   HCT 40.9 39.8   MCV 81.3 76.4*    161     Lab Results   Component Value Date/Time    TROPONINI 0.05 2022 02:08 PM    TROPONINI 0.06 2022 08:30 AM    TROPONINI <0.01 2022 05:01 AM     Estimated Creatinine Clearance: 126 mL/min (based on SCr of 0.6 mg/dL). No results found for: BNP  Lab Results   Component Value Date/Time    PROTIME 17.0 11/19/2022 08:30 AM    INR 1.39 11/19/2022 08:30 AM     No results found for: CHOL, HDL, TRIG    I independently reviewed the ECG, telemetry, and serology results. Scheduled Meds:   chlorhexidine  15 mL Mouth/Throat BID    piperacillin-tazobactam  3,375 mg IntraVENous Q8H    lactobacillus  2 capsule Oral BID    pantoprazole  40 mg IntraVENous Daily    mupirocin   Topical BID    levETIRAcetam  1,000 mg IntraVENous Q12H    methylPREDNISolone  40 mg IntraVENous Q12H     Continuous Infusions:   propofol 50 mcg/kg/min (11/20/22 0634)    heparin (PORCINE) Infusion 810 Units/hr (11/20/22 0820)    norepinephrine 15 mcg/min (11/20/22 0631)    insulin 1 Units/hr (11/20/22 0737)    dextrose      fentaNYL 50 mcg/hr (11/20/22 0606)    cisatracurium (NIMBEX) infusion 0.5 mcg/kg/min (11/20/22 0606)    sodium bicarbonate infusion 100 mL/hr at 11/20/22 0804     PRN Meds:ondansetron **OR** ondansetron, perflutren lipid microspheres, glucose, dextrose bolus **OR** dextrose bolus, glucagon (rDNA), dextrose, fentaNYL **AND** fentaNYL     Patient Active Problem List    Diagnosis Date Noted    [facility] 11/19/2022    Aspiration pneumonia ([facility] 11/19/2022      Active Hospital Problems    Diagnosis Date Noted    Cardiac arrest [facility]) [I46.9] 11/19/2022     Priority: Medium    Aspiration pneumonia ([facility] [J69.0] 11/19/2022     Priority: Medium       Assessment and Plan:   PEA arrest  Aspiration pneumonia  Hypoxemia  Acidemia     No strips or evidence of arrest from an arrhythmia. Most likely respiratory. Prolonged QT but in the setting of critical illness and sedation. Echocardiogram showed normal LV and RV size and systolic function. Normal PA pressure. On hypothermia protocol. Rewarming today. .               - monitor electrolytes              - awaiting neurological assessment.   - get ECG after rewarming is complete    Thank you for allowing me to participate in the care of this patient. If you have any questions, please do not hesitate to contact me.     Electronically signed by Dmitry Coon MD on 11/19/2022 at 8:24 AM.    Dmitry Coon MD  Cardiac Electrophysiology  [facility]

## 2022-11-20 NOTE — PROGRESS NOTES
4 Eyes Skin Assessment     NAME:  Winter Holguin  YOB: 1975  MEDICAL RECORD NUMBER:  [id number]    The patient is being assessed for  Shift Handoff    I agree that One RN have performed a thorough Head to Toe Skin Assessment on the patient. ALL assessment sites listed below have been assessed. Areas assessed by both nurses:    Head, Face, Ears, Shoulders, Back, Chest, Arms, Elbows, Hands, Sacrum. Buttock, Coccyx, Ischium, and Legs. Feet and Heels        Does the Patient have a Wound?  No noted wound(s)       Brenden Prevention initiated by RN: Yes   Wound Care Orders initiated by RN: NA    Pressure Injury (Stage 3,4, Unstageable, DTI, NWPT, and Complex wounds) if present place referral order by RN under : NA    New and Established Ostomies, if present place, referral order under : NA      Nurse 1 eSignature: Electronically signed by Tameka Bernal RN on 11/20/22 at 7:24 AM EST    **SHARE this note so that the co-signing nurse is able to place an eSignature**    Nurse 2 eSignature: Electronically signed by Liam Guerrero RN on 11/20/22 at 2:43 PM EST

## 2022-11-20 NOTE — PROGRESS NOTES
4 Eyes Skin Assessment     NAME:  Maryellen Gorman  YOB: 1975  MEDICAL RECORD NUMBER:  [id number]    The patient is being assessed for  Shift Handoff    I agree that One RN have performed a thorough Head to Toe Skin Assessment on the patient. ALL assessment sites listed below have been assessed. Areas assessed by both nurses:    Head, Face, Ears, Shoulders, Back, Chest, Arms, Elbows, Hands, Sacrum. Buttock, Coccyx, Ischium, and Legs. Feet and Heels        Does the Patient have a Wound?  No noted wound(s)       Brenden Prevention initiated by RN: Yes   Wound Care Orders initiated by RN: Yes    Pressure Injury (Stage 3,4, Unstageable, DTI, NWPT, and Complex wounds) if present place referral order by RN under : NA    New and Established Ostomies, if present place, referral order under : NA      Nurse 1 eSignature: Electronically signed by Russ Abdul RN on 11/19/22 at 7:35 PM EST    **SHARE this note so that the co-signing nurse is able to place an eSignature**    Nurse 2 eSignature: Electronically signed by Voncille Burkitt, RN on 11/20/22 at 2:25 AM EST

## 2022-11-20 NOTE — PROGRESS NOTES
1923: Report received from [facility]; handoff complete. Turn/skin assessment deferred d/t hemodynamic instability. Intubated, sedated and paralyzed. RASS -5. SpO2 85-86%. Nimbex restarted per MARILYN Nunez RN for saturation issues. All lines intact, all meds/fluids infusing as ordered. 1935: Call received from Dr. Prabhu Anthony; lab results reviewed and new orders received. Pharmacy notified  2015: Assessment complete--see flow sheets. SpO2 improving with Nimbex  2146: Call received from [facility]: Update given and all questions answered  [phone]: Repositioned per proning protocol; drop in SpO2 to 86 on movement. Returned to 90% within 10 minutes  2307: Critical PTT from lab; pharmacy notified; see orders  [id number]: SpO2 trending down following proning reposition; will watch  0118: pH received from lab. Will hold MD notification as level is improving  0123: Call received from pt's cousin Bry Dominguez. Update given and all questions answered. New contact placed on chart: Iván (cousin)  0128: PCO2 rising; ABG results communicated to Dr. Prabhu Anthony. To recheck this am  0200: Repositioned to L side down; SpO2 improvement to > 90%  0610: SpO2 remains > 90%.  VSS  0715: Report given to [facility], handoff complete

## 2022-11-20 NOTE — PLAN OF CARE
Problem: Safety - Violent/Self-destructive Restraint  Goal: Remains free of injury from restraints (Restraint for Violent/Self-Destructive Behavior)  Description: INTERVENTIONS:  1. Determine that de-escalation and other, less restrictive measures have been tried or would not be effective before applying the restraint  2. Identify and document the criteria for restraint  3. Evaluate the patient's condition at the time of restraint application  4. Inform patient/family regarding the reason for restraint/seclusion  5. Q2H: Monitor comfort, nutrition and hydration needs  6. Q15M: Perform safety checks including skin, circulation, sensory, respiratory and psychological status  7. Ensure continuous observation  8. Identify and implement measures to help patient regain control, assess readiness for release and initiate progressive release per policy  Outcome: Progressing     Problem: Safety - Medical Restraint  Goal: Remains free of injury from restraints (Restraint for Interference with Medical Device)  Description: INTERVENTIONS:  1. Determine that other, less restrictive measures have been tried or would not be effective before applying the restraint  2. Evaluate the patient's condition at the time of restraint application  3. Inform patient/family regarding the reason for restraint  4.  Q2H: Monitor safety, psychosocial status, comfort, nutrition and hydration  Outcome: Progressing     Problem: Discharge Planning  Goal: Discharge to home or other facility with appropriate resources  Outcome: Progressing  Flowsheets (Taken 11/20/2022 0800)  Discharge to home or other facility with appropriate resources:   Identify barriers to discharge with patient and caregiver   Arrange for needed discharge resources and transportation as appropriate     Problem: Pain  Goal: Verbalizes/displays adequate comfort level or baseline comfort level  Outcome: Progressing  Flowsheets (Taken 11/20/2022 1200)  Verbalizes/displays adequate comfort level or baseline comfort level:   Assess pain using appropriate pain scale   Administer analgesics based on type and severity of pain and evaluate response   Implement non-pharmacological measures as appropriate and evaluate response   Consider cultural and social influences on pain and pain management     Problem: Skin/Tissue Integrity  Goal: Absence of new skin breakdown  Description: 1. Monitor for areas of redness and/or skin breakdown  2. Assess vascular access sites hourly  3. Every 4-6 hours minimum:  Change oxygen saturation probe site  4. Every 4-6 hours:  If on nasal continuous positive airway pressure, respiratory therapy assess nares and determine need for appliance change or resting period.   Outcome: Progressing

## 2022-11-20 NOTE — PROGRESS NOTES
Hospitalist Progress Note    Patient:  Malka Pena  Unit/Bed:U8B-04842111/2111-01   YOB: 1975       MRN: [id number] Acct: [de-identified]  PCP: FARHAN Solis CNP    Date of Admission: 11/19/2022  --------------------------    Chief Complaint:      Cardiac arrest    Interval history    Malka Pena is a 52 y.o. female hospitalized on 11/19/2022   after cardiac arrest.  Patient called EMS for flulike symptoms. She developed altered mental status while on the ambulance. In the ED she underwent CPR with ROSC achieved approximately 10 minutes. Initial rhythm PEA. Assessment/plan:      Cardiopulmonary arrest with ROSC approximately after 10 minutes of resuscitation. Initial rhythm was PEA. Influenza A  Severe acute hypoxic respiratory failure, CTA of the chest showed no pulmonary embolism  Acute toxic metabolic encephalopathy  Myoclonic jerks/seizure ? Prior history of seizures  Pulmonary edema, echocardiogram reviewed. Normal EF. Aspiration pneumonia  Hyperglycemia ?   ,  A1c 6. Nonzero troponinemia, multifactorial  Obesity      PLAN    Neuro:  -Rewarming process initiated,  -Neurology consulted, EEG after rewarming  -Patient currently receiving sedation, wean off  -Patient on paralytic secondary to severe hypoxemia    Pulm:  -Patient on high vent setting, required proning last night.  -Continue paralytics for vent synchrony  -Continue antibiotics and Tamiflu    CVS:  -Echocardiogram reviewed, normal systolic function,  -Cardiology following.  -Discontinue heparin  -Continue vasopressor support as required. Renal:  -Continue monitoring renal function, urine output. Replete electrolyte as required    GI:  -GI prophylaxis, tube feeding if prolonged n.p.o. ID:  -Continue broad-spectrum antibiotics for aspiration pneumonia, continue Tamiflu    Endocrine:  -Currently on insulin drip, monitor blood sugar, A1c 6.   Likely induced by steroid    Hem/Onc:  -Monitor CBC Skin: Skin care           Code Status: Full Code         DVT prophylaxis: Heparin changed to Lovenox     Disposition: Continue ICU care, overall prognosis is poor    Discussed with critical care team  Discussed with RN    ----------------      Subjective:     Patient seen and examined  Overnight events noted  RN and ancillary staff note reviewed    Patient with severe hypoxemia required proning yesterday. Patient currently supine, marginal oxygen saturation on high vent setting. Rewarming started. Diet: Diet NPO    OBJECTIVE     Exam:  /66   Pulse 63   Temp (!) 91.8 °F (33.2 °C) (Bladder)   Resp 24   Ht 5' 3\" (1.6 m)   Wt 207 lb 10.8 oz (94.2 kg)   SpO2 (!) 87%   BMI 36.79 kg/m²        Gen: sedated  Head: Normocephalic. Atraumatic. ENT: ETT and tube feeding  in place, feeding tube in place, no oral ulceration. CVS: Nml S1S2, no murmur   RRR  Pulmomary: Reduce air entry anteriorly  on the high vent setting  Gastrointestinal: Soft, nondistended, no grimacing, . Musculoskeletal: No edema.  Warm  Neuro: Sedated, unable to assess  Psychiatry:  unable to assess       Medications:  Reviewed    Infusion Medications    norepinephrine 15 mcg/min (11/20/22 1011)    propofol 50 mcg/kg/min (11/20/22 0958)    insulin 1.1 Units/hr (11/20/22 1004)    dextrose      fentaNYL 50 mcg/hr (11/20/22 0958)    cisatracurium (NIMBEX) infusion 1.5 mcg/kg/min (11/20/22 0958)    sodium bicarbonate infusion 100 mL/hr at 11/20/22 0958     Scheduled Medications    oseltamivir 6mg/ml  75 mg Oral BID    calcium gluconate IVPB  2,000 mg IntraVENous Once    potassium chloride  20 mEq IntraVENous Once    magnesium sulfate  2,000 mg IntraVENous Once    [START ON 11/21/2022] enoxaparin  40 mg SubCUTAneous Daily    chlorhexidine  15 mL Mouth/Throat BID    piperacillin-tazobactam  3,375 mg IntraVENous Q8H    lactobacillus  2 capsule Oral BID    pantoprazole  40 mg IntraVENous Daily    mupirocin   Topical BID    levETIRAcetam  1,000 mg IntraVENous Q12H    methylPREDNISolone  40 mg IntraVENous Q12H     PRN Meds: ondansetron **OR** ondansetron, perflutren lipid microspheres, glucose, dextrose bolus **OR** dextrose bolus, glucagon (rDNA), dextrose, fentaNYL **AND** fentaNYL      Intake/Output Summary (Last 24 hours) at 11/20/2022 1133  Last data filed at 11/20/2022 1001  Gross per 24 hour   Intake 4770.84 ml   Output 1450 ml   Net 3320.84 ml             Labs:   Recent Labs     11/19/22  0501 11/20/22  0545   WBC 13.9* 7.1   HGB 12.2 12.5   HCT 40.9 39.8    161     Recent Labs     11/19/22  1408 11/19/22  1855 11/20/22  0055 11/20/22  0545    136 137 140   K 3.8 3.5 3.4* 3.5    112* 109 104   CO2 17* 17* 21 22   BUN 17 17 15 13   CREATININE 0.8 0.8 0.7 0.6   CALCIUM 7.2* 7.4* 7.2* 7.0*   PHOS 3.8  --  3.7 3.9     Recent Labs     11/19/22  0501   AST 22   ALT 17   BILIDIR <0.2   BILITOT 0.5   ALKPHOS 79     Recent Labs     11/19/22  0830   INR 1.39*     Recent Labs     11/19/22  0501 11/19/22  0830 11/19/22  1408   TROPONINI <0.01 0.06* 0.05*       Urinalysis:      Lab Results   Component Value Date/Time    NITRU Negative 11/19/2022 06:00 AM    WBCUA 3-5 11/19/2022 06:00 AM    BACTERIA 1+ 11/19/2022 06:00 AM    RBCUA 0-2 11/19/2022 06:00 AM    BLOODU TRACE 11/19/2022 06:00 AM    SPECGRAV 1.045 11/19/2022 06:00 AM    GLUCOSEU Negative 11/19/2022 06:00 AM       Radiology:  XR CHEST PORTABLE   Final Result   Bilateral airspace disease, increased compared to prior         XR CHEST PORTABLE   Final Result   Bilateral airspace opacities right greater than left could represent diffuse   pulmonary edema or infection         CT CHEST PULMONARY EMBOLISM W CONTRAST   Final Result   1. No evidence of pulmonary embolism. 2.  Right mainstem intubation, for which retraction 3 cm is recommended. 3.  Enteric tube terminates in the stomach. 4.  Interstitial edema with small right effusion and trace left effusion.    Bilateral airspace disease may represent a component of alveolar edema as   well as aspiration pneumonitis. CT HEAD WO CONTRAST   Final Result   No acute intracranial abnormality.                      Electronically signed by Rae Hamm MD on 11/20/2022 at 11:33 AM

## 2022-11-20 NOTE — PROGRESS NOTES
Clinical Pharmacy Note  Heparin Dosing       Lab Results   Component Value Date/Time    APTT 112.1 11/19/2022 10:47 PM     Lab Results   Component Value Date/Time    HGB 12.2 11/19/2022 05:01 AM    HCT 40.9 11/19/2022 05:01 AM     11/19/2022 05:01 AM    INR 1.39 11/19/2022 08:30 AM       Current Infusion Rate: 1100 units/hr    Plan:  Rate: decrease to 1000 units/hr  Next aPTT: 0500 11/20/22    Pharmacy will continue to monitor and adjust based on aPTT results.   Keisha Schultz, PharmD

## 2022-11-20 NOTE — PROGRESS NOTES
Patient has had zero twitches on the lowest dose of nimbex, 0.5 mcg/kg/hr since 1400. Dr. Jesse Kruse messaged regarding nimbex gtt. Dr. Gopi Schroeder updated about the lack of twitches. Asked if he would like to continue the nimbex gtt at the lowest dose, or stop the gtt until the twitches return. 1626: Nimbex gtt stopped. Will restart when 4/4 twitches returns.

## 2022-11-20 NOTE — PROGRESS NOTES
Rounds with Dr. Davidson Mary. Instructed to place pt supine. Pt placed supine at 0935. Notified Dr. Diamond Duong of pt's decrease in oxygen saturation to 85-88's after placing pt supine.

## 2022-11-20 NOTE — PROGRESS NOTES
Nimbex gtt remains off as patient continues to have zero twitches. However, despite being prone, patient is starting to have low saturation. Dr. Delmy Castellano messaged d/t to desaturation. Per Dr. Dunbar Noss OK to restart Nimbex and keep at low dose of 0.5 mcg/kg/hr throughout the night.

## 2022-11-20 NOTE — PROGRESS NOTES
Pulmonary Progress Note    Date of Admission: 11/19/2022   LOS: 1 day       CC:  No chief complaint on file. Subjective:  Severe hypoxemia. Proned. Hypoxemia improved. Continue paralytics overnight. ROS:   No nausea  No Vomiting  No chest pain       Assessment:     History of seizure  Obesity  Hypertension  History of learning disability  Prediabetes    Plan: This note may have been transcribed using [id number]. Please disregard any translational errors. Hospital Day: 1     Status post cardiac arrest/cardiovascular  Patient currently not responding to pain. Having movements concerning for myoclonus. Keppra for concern for seizure. Propofol for sedation. Fentanyl  Patient room hyperkalemia protocol with proning. Paralytic use overnight. Now has train-of-four. Rewarming started at 745 this morning. Secondary to hypoxemia, keep sedation had a through rewarming phase. Will order EEG for Monday with history of seizure and activity seen yesterday. Flu positive raise a question of myocarditis with cardiac arrest.       Influenza pneumonia/aspiration pneumonia with ARDS  Copious bloody secretions. Sent for culture. Viral PCR to be sent. Assess for COVID flu and bacterial pneumonia. Zosyn. Consider discontinuing Zosyn if cultures continue to be negative. Follow-up procalcitonin. MRSA nasal probe. Negative  Flu positive as of this morning. Start Tamiflu. Continue paralytics through proning process. Assess ABG after supine and consider stopping paralytics at that time. Mechanical ventilation  Currently significantly tachypneic. Recruit maneuver completed. Placed on spontaneous full support 25/10. Change to [facility] VC plus after sedation increased. PEEP 15 FiO2 100%, 24, 300, 1.2 seconds     Shock  Wean to MAP 65. Metabolic acidosis  Changed to bicarb drip last night. Improvement in and bicarb up to 22.  pH improving. Respiratory rate 24 on the ventilator. Continue bicarb drip for now. Hyperglycemia  Insulin drip     History of seizure disorder  Keppra  EEG ordered for tomorrow     Prophylaxis  - GI -   protonix    - DVT - heparin    - VAP - peridex  - C. Diff - culturelle   - Nasal Decolonization - Bactroban    Access  Arterial   Arterial Line 11/19/22 Left Brachial (Active)   $ Arterial line insertion $ Yes 11/19/22 0900   Site Assessment Clean, dry & intact 11/20/22 0600   Line Status Arterial fluids per protocol; Intact and in place; Blood return noted 11/20/22 0600   Art Line Interventions Leveled 11/20/22 0600   Color/Movement/Sensation Capillary refill less than 3 sec; Cool fingers/toes 11/20/22 0600   Dressing Type Transparent; Other (Comment) 11/20/22 0600   Dressing Status Clean, dry & intact 11/20/22 0600   Number of days: 0       PICC          CVC       CVC Triple Lumen 11/19/22 Right Femoral (Active)   Central Line Being Utilized Yes 11/20/22 0600   Criteria for Appropriate Use Hemodynamically unstable, requiring monitoring lines, vasopressors, or volume resuscitation 11/20/22 0600   Site Assessment Clean, dry & intact 11/20/22 0600   Phlebitis Assessment No symptoms 11/20/22 0600   Infiltration Assessment 0 11/20/22 0600   Color/Movement/Sensation Capillary refill less than 3 sec 11/20/22 0600   Proximal Lumen Color/Status White; Infusing;Capped 11/20/22 0600   Medial Lumen Status Blue; Infusing;Capped 11/20/22 0600   Distal Lumen Color/Status Brown; Infusing;Capped 11/20/22 0600   Line Care Connections checked and tightened 11/19/22 2200   Alcohol Cap Used Yes 11/20/22 0600   Date of Last Dressing Change 11/19/22 11/20/22 0600   Dressing Type Transparent; Antimicrobial 11/20/22 0600   Dressing Status Clean, dry & intact 11/20/22 0600   Number of days: 1                   I spent 34 minutes of critical care time with this patient excluding any procedures.      Data:        PHYSICAL EXAM:   Blood pressure 110/66, pulse 68, temperature (!) 91.8 °F (33.2 °C), temperature source Bladder, resp. rate 24, height 5' 3\" (1.6 m), weight 207 lb 10.8 oz (94.2 kg), SpO2 93 %.'  Body mass index is 36.79 kg/m². Gen: No distress. ENT:   Resp: No accessory muscle use. No crackles. No wheezes. No rhonchi. Posterior exam.  CV: Regular rate. Regular rhythm. No murmur or rub. No edema. Skin: Warm, dry, normal texture and turgor. No nodule on exposed extremities. M/S: No cyanosis. No clubbing. No joint deformity. Psych: Proned. Sedated.     Medications:    Scheduled Meds:   chlorhexidine  15 mL Mouth/Throat BID    piperacillin-tazobactam  3,375 mg IntraVENous Q8H    lactobacillus  2 capsule Oral BID    pantoprazole  40 mg IntraVENous Daily    mupirocin   Topical BID    levETIRAcetam  1,000 mg IntraVENous Q12H    methylPREDNISolone  40 mg IntraVENous Q12H       Continuous Infusions:   propofol 50 mcg/kg/min (11/20/22 0634)    heparin (PORCINE) Infusion 810 Units/hr (11/20/22 0820)    norepinephrine 15 mcg/min (11/20/22 0631)    insulin 1 Units/hr (11/20/22 0737)    dextrose      fentaNYL 50 mcg/hr (11/20/22 0606)    cisatracurium (NIMBEX) infusion 0.5 mcg/kg/min (11/20/22 0606)    sodium bicarbonate infusion 100 mL/hr at 11/20/22 0804       PRN Meds:  ondansetron **OR** ondansetron, perflutren lipid microspheres, glucose, dextrose bolus **OR** dextrose bolus, glucagon (rDNA), dextrose, fentaNYL **AND** fentaNYL    Labs reviewed:  CBC:   Recent Labs     11/19/22  0501 11/20/22  0545   WBC 13.9* 7.1   HGB 12.2 12.5   HCT 40.9 39.8   MCV 81.3 76.4*    161     BMP:   Recent Labs     11/19/22  1408 11/19/22  1855 11/20/22  0055 11/20/22  0545    136 137 140   K 3.8 3.5 3.4* 3.5    112* 109 104   CO2 17* 17* 21 22   PHOS 3.8  --  3.7 3.9   BUN 17 17 15 13   CREATININE 0.8 0.8 0.7 0.6     LIVER PROFILE:   Recent Labs     11/19/22  0501   AST 22   ALT 17   LIPASE 14.0   BILIDIR <0.2   BILITOT 0.5   ALKPHOS 79     PT/INR:   Recent Labs     11/19/22  0830   PROTIME 17.0* INR 1.39*     APTT:   Recent Labs     11/19/22  1408 11/19/22  2247 11/20/22  0545   APTT 71.4* 112.1* 104.9*     UA:  Recent Labs     11/19/22  0600   COLORU Yellow   PHUR 5.5  5.0   WBCUA 3-5   RBCUA 0-2   BACTERIA 1+*   CLARITYU CLOUDY*   SPECGRAV 1.045   LEUKOCYTESUR Negative   UROBILINOGEN 1.0   BILIRUBINUR Negative   BLOODU TRACE*   GLUCOSEU Negative     No results for input(s): PH, PCO2, PO2 in the last 72 hours. Cx:      Films:  Radiology Review:  Pertinent images / reports were reviewed as a part of this visit. CT Chest w/ contrast: No results found for this or any previous visit. CT Chest w/o contrast: No results found for this or any previous visit. CTPA: Results for orders placed during the hospital encounter of 11/19/22    CT CHEST PULMONARY EMBOLISM W CONTRAST    Narrative  EXAMINATION:  CTA OF THE CHEST 11/19/2022 5:31 am    TECHNIQUE:  CTA of the chest was performed after the administration of intravenous  contrast.  Multiplanar reformatted images are provided for review. MIP  images are provided for review. Automated exposure control, iterative  reconstruction, and/or weight based adjustment of the mA/kV was utilized to  reduce the radiation dose to as low as reasonably achievable. COMPARISON:  None. HISTORY:  ORDERING SYSTEM PROVIDED HISTORY: Arrest  TECHNOLOGIST PROVIDED HISTORY:  Reason for exam:->Arrest  Decision Support Exception - unselect if not a suspected or confirmed  emergency medical condition->Emergency Medical Condition (MA)  Reason for Exam: Arrest    FINDINGS:  Pulmonary Arteries: Pulmonary arteries are adequately opacified for  evaluation. No evidence of intraluminal filling defect to suggest pulmonary  embolism. Main pulmonary artery is normal in caliber. Mediastinum: Endotracheal tube terminates in the proximal right mainstem  bronchus. Enteric tube terminates in the body of the stomach, coursing off  the field of view.   No evidence of mediastinal lymphadenopathy. The heart  and pericardium demonstrate no acute abnormality. Lungs/pleura: Septal thickening and bilateral available opacities are noted. The opacities are most confluent in the dependent lungs. Trace left pleural  effusion and small right effusion. No pneumothorax. Upper Abdomen: Limited images of the upper abdomen are unremarkable. Soft Tissues/Bones: No acute bone or soft tissue abnormality. Impression  1. No evidence of pulmonary embolism. 2.  Right mainstem intubation, for which retraction 3 cm is recommended. 3.  Enteric tube terminates in the stomach. 4.  Interstitial edema with small right effusion and trace left effusion. Bilateral airspace disease may represent a component of alveolar edema as  well as aspiration pneumonitis. CXR PA/LAT: No results found for this or any previous visit. CXR portable: Results for orders placed during the hospital encounter of 11/19/22    XR CHEST PORTABLE    Narrative  EXAMINATION:  ONE XRAY VIEW OF THE CHEST    11/19/2022 8:10 am    COMPARISON:  None. HISTORY:  ORDERING SYSTEM PROVIDED HISTORY: hypoxemia,  TECHNOLOGIST PROVIDED HISTORY:  Reason for exam:->hypoxemia,  Reason for Exam: hypoxemia    FINDINGS:  Endotracheal tube in satisfactory position above the abiodun. Enteric  catheter courses into the abdomen. Heart size at the upper limits of normal.  Bilateral airspace opacities right greater than left. Impression  Bilateral airspace opacities right greater than left could represent diffuse  pulmonary edema or infection             This note was transcribed using [employer]. Please disregard any translational errors.       Anibal Gonzalez [facility] [phone]

## 2022-11-21 PROBLEM — J80 ARDS (ADULT RESPIRATORY DISTRESS SYNDROME) (HCC): Status: ACTIVE | Noted: 2022-01-01

## 2022-11-21 PROBLEM — R94.31 PROLONGED QT INTERVAL: Status: ACTIVE | Noted: 2022-01-01

## 2022-11-21 PROBLEM — A41.9 SEPTIC SHOCK (HCC): Status: ACTIVE | Noted: 2022-01-01

## 2022-11-21 PROBLEM — J10.1 INFLUENZA A: Status: ACTIVE | Noted: 2022-01-01

## 2022-11-21 PROBLEM — R57.9 SHOCK (HCC): Status: ACTIVE | Noted: 2022-01-01

## 2022-11-21 PROBLEM — R65.21 SEPTIC SHOCK (HCC): Status: ACTIVE | Noted: 2022-01-01

## 2022-11-21 NOTE — CARE COORDINATION
Discharge Planning:    Per chart review, patient on vent and on sedation to aid in vent synchrony. Patient also in isolation for Influenza A. Call placed to emergency contact, Navjot Lerner. Left message requesting return call in an effort to introduce myself and role of case management. Will continue to follow for updates.     Electronically signed by Noemy López RN on 11/21/2022 at 2:54 PM

## 2022-11-21 NOTE — PROGRESS NOTES
Jass Beltran, patients cousin and emergency contact called this RN for an update. This RN updated patient on current status. Updated on recent desaturation. Updated about placing patient supine. Updated on current medications and adjustments made today. Jass Beltran mentions to this RN that her work needed proof that Jass Marcuskiesha was with patient in hospital on day that patient was admitted. Jass Beltran states she is unable to come to the bedside and is requesting an e-mail. This RN reaches out to [employer], RN supervisor. Nancy Negrete RN admitted patient on Saturday, 11/19 Derrick Olivares RN states no family at bedside at time patient admitted to ICU. Juarez Fine RN supervisor states to forward Jass Beltran to ED since family was not in ICU when patient was admitted. RN went to forward Ariane's call to ED and Jass Beltran was no longer on line.

## 2022-11-21 NOTE — PROGRESS NOTES
Pulmonary Progress Note    CC:  Follow up cardiac arrest, ARDS, influenza    Subjective:  FIO2 at 100% and PEEP at 15  Paralyzed and in prone position   Remains on pressors as well   Normothermia as of 2 am      Intake/Output Summary (Last 24 hours) at 11/21/2022 0902  Last data filed at 11/21/2022 0616  Gross per 24 hour   Intake 4925.66 ml   Output 2158 ml   Net 2767.66 ml         PHYSICAL EXAM:  Blood pressure (!) 106/59, pulse 87, temperature 98.6 °F (37 °C), temperature source Bladder, resp. rate 24, height 5' 3\" (1.6 m), weight 207 lb 10.8 oz (94.2 kg), SpO2 100 %.'  Gen: Prone   Eyes: Prone, mild conjunctival injection   ENT: No discharge. Pharynx with ETT and gastric tube  Neck: Trachea midline. No obvious mass. Resp: Diminished but clear   CV: Regular rate. Regular rhythm. No murmur or rub. GI: Prone   Skin: Edematous   Lymph: No cervical LAD. No supraclavicular LAD. M/S: No cyanosis. No clubbing. No joint deformity.     Neuro: Paralyzed  Ext:   no edema    Medications:    Scheduled Meds:   artificial tears   Both Eyes 6 times per day    insulin lispro  0-8 Units SubCUTAneous Q4H    oseltamivir 6mg/ml  75 mg Oral BID    enoxaparin  40 mg SubCUTAneous Daily    chlorhexidine  15 mL Mouth/Throat BID    piperacillin-tazobactam  3,375 mg IntraVENous Q8H    lactobacillus  2 capsule Oral BID    pantoprazole  40 mg IntraVENous Daily    mupirocin   Topical BID    levETIRAcetam  1,000 mg IntraVENous Q12H    methylPREDNISolone  40 mg IntraVENous Q12H       Continuous Infusions:   norepinephrine 15 mcg/min (11/21/22 0616)    propofol 50 mcg/kg/min (11/21/22 0628)    dextrose      fentaNYL 50 mcg/hr (11/21/22 0616)    cisatracurium (NIMBEX) infusion 2.5 mcg/kg/min (11/21/22 0616)       PRN Meds:  ondansetron **OR** ondansetron, perflutren lipid microspheres, glucose, dextrose bolus **OR** dextrose bolus, glucagon (rDNA), dextrose, fentaNYL **AND** fentaNYL    Labs:  CBC:   Recent Labs     11/19/22  0501 22  0545 22  0537   WBC 13.9* 7.1 12.1*   HGB 12.2 12.5 10.4*   HCT 40.9 39.8 32.9*   MCV 81.3 76.4* 76.3*    161 137     BMP:   Recent Labs     22  1755 22  0049 22  0537    144 144   K 3.6 4.1 4.1    105 105   CO2 25 28 27   PHOS 2.8 2.8 2.8   BUN 10 9 8   CREATININE 0.6 0.5* 0.6     LIVER PROFILE:   Recent Labs     22  0501   AST 22   ALT 17   LIPASE 14.0   BILIDIR <0.2   BILITOT 0.5   ALKPHOS 79     PT/INR:   Recent Labs     22  0830   PROTIME 17.0*   INR 1.39*     APTT:   Recent Labs     22  1408 22  2247 22  0545   APTT 71.4* 112.1* 104.9*     UA:  Recent Labs     22  0600   COLORU Yellow   PHUR 5.5  5.0   WBCUA 3-5   RBCUA 0-2   BACTERIA 1+*   CLARITYU CLOUDY*   SPECGRAV 1.045   LEUKOCYTESUR Negative   UROBILINOGEN 1.0   BILIRUBINUR Negative   BLOODU TRACE*   GLUCOSEU Negative     No results for input(s): PH, PCO2, PO2 in the last 72 hours. Films:  Chest imaging reports were reviewed and imaging was reviewed by me and showed ETT and gastric tube in place. Diffuse airspace disease bilaterally     AB.44/50/105    Cultures:  Sputum;  pending  Blood:  NGTD    I reviewed the labs and images listed above    Assessment/Plan:   Cardiopulmonary Arrest PEA  Full vent support. Keep settings as is as airway pressures are WNL  Place supine today. Abg 1 hour after supine, if ok will discontinue paralytic  Have to keep sedation for RASS goal of -5 while paralyzed   Shock, likely cardiogenic and septic   Levophed for MAP of 65  Start Cefepime and Flagyl and discontinue Zosyn. Outside chart says she has anaphylaxis to PCN. [address] pointed this out. Cultures in process  Trend procal   ARDS  Vent protection strategy   Stop IV fluids to keep lung dry.   May need diuresis   Ok for pH of 7.2, PaO2 of 55 and saturation of 88%  Influenza A  Tamiflu as ordered  Ok with solumedrol for now   Anoxic Encephalopathy Follow up imaging to be delayed until more stable  Neuro exam limited due to ongoing paralysis   Myoclonus   Keppra  EEG  Neurology following     Discontinue insulin infusion  Medium scale  Keep NPO if case she has to be placed prone again    GI prophylaxis  Protonix  DVT prophylaxis  Lovenox     Guarded prognosis. Critical care time of 32 minutes. This does not include procedural time. Please see procedural notes for details.     Sixto Iqbal DO  [facility]monary

## 2022-11-21 NOTE — PROGRESS NOTES
Hospitalist Progress Note    Patient:  Ammon Wilson  Unit/Bed:[id number]-25032111/2111-01   YOB: 1975       MRN: [id number] Acct: [de-identified]  PCP: FARHAN Mckeon CNP    Date of Admission: 11/19/2022  --------------------------    Chief Complaint:      Cardiac arrest    Interval history    Ammon Wilson is a 52 y.o. female hospitalized on 11/19/2022   after cardiac arrest.  Patient called EMS for flulike symptoms. She developed altered mental status while on the ambulance. In the ED she underwent CPR with ROSC achieved approximately 10 minutes. Initial rhythm PEA. Assessment/plan:      Cardiopulmonary arrest with ROSC approximately after 10 minutes of resuscitation. Initial rhythm was PEA. Influenza A  Severe acute hypoxic respiratory failure,/ARDS CTA of the chest showed no pulmonary embolism  Acute toxic metabolic encephalopathy  Myoclonic jerks/seizure ? Prior history of seizures  Pulmonary edema, echocardiogram reviewed. Normal EF. Aspiration pneumonia  Hyperglycemia ?   ,  A1c 6. Nonzero troponinemia, multifactorial  Obesity      PLAN    Neuro:  -Rewarming process completed 11/20/2022  -Neurology but appreciated, EEG after removal of paralytics  -Continue paralytic agents, sedation per ICU team for vent synchrony. Pulm:  -Overall hypoxia slightly better  -Continue paralytics for vent synchrony per ICU team  -Continue broad broad-spectrum antibiotics and Tamiflu    CVS:  -Echocardiogram reviewed, normal systolic function,   -Continue vasopressor support as required. Renal:  Creatinine within reasonable range, continue monitoring, monitor urine output. Monitor and replete electrolyte. GI:  -GI prophylaxis, tube feed per ICU team maintain Tamiflu broad-spectrum antibiotics for now. ID:  -Continue broad-spectrum antibiotics for aspiration pneumonia, continue Tamiflu    Endocrine:  -ICU insulin drip. Monitor blood sugar, A1c 6.   Likely induced by steroid    Hem/Onc:  -Monitor CBC       Skin: Continue skin care           Code Status: Full Code         DVT prophylaxis: Lovenox     Disposition: Continue ICU care         ----------------      Subjective:     Patient seen and examined  Overnight events noted  RN and ancillary staff note reviewed    No major overnight events  Continues to require high vent setting. Diet: Diet NPO    OBJECTIVE     Exam:  BP (!) 106/51   Pulse 94   Temp 98.2 °F (36.8 °C) (Bladder)   Resp 24   Ht 5' 3\" (1.6 m)   Wt 207 lb 10.8 oz (94.2 kg)   SpO2 97%   BMI 36.79 kg/m²      Unchanged exam finding as documented below  Gen: sedated  Head: Normocephalic. Atraumatic. ENT: ETT and tube feeding  in place, feeding tube in place, no oral ulceration. CVS: Nml S1S2, no murmur   RRR  Pulmomary: Reduce air entry anteriorly  on the high vent setting  Gastrointestinal: Soft, nondistended, no grimacing, . Musculoskeletal: No edema.  Warm  Neuro: Sedated, unable to assess  Psychiatry:  unable to assess       Medications:  Reviewed    Infusion Medications    norepinephrine 15 mcg/min (11/21/22 0616)    propofol 50 mcg/kg/min (11/21/22 1030)    dextrose      fentaNYL 50 mcg/hr (11/21/22 0616)    cisatracurium (NIMBEX) infusion 4 mcg/kg/min (11/21/22 1247)     Scheduled Medications    artificial tears   Both Eyes 6 times per day    metroNIDAZOLE  500 mg IntraVENous Q8H    cefepime  2,000 mg IntraVENous Q8H    insulin lispro  0-8 Units SubCUTAneous Q4H    oseltamivir 6mg/ml  75 mg Oral BID    enoxaparin  40 mg SubCUTAneous Daily    chlorhexidine  15 mL Mouth/Throat BID    lactobacillus  2 capsule Oral BID    pantoprazole  40 mg IntraVENous Daily    mupirocin   Topical BID    levETIRAcetam  1,000 mg IntraVENous Q12H    methylPREDNISolone  40 mg IntraVENous Q12H     PRN Meds: ondansetron **OR** ondansetron, perflutren lipid microspheres, glucose, dextrose bolus **OR** dextrose bolus, glucagon (rDNA), dextrose, fentaNYL **AND** fentaNYL      Intake/Output Summary (Last 24 hours) at 11/21/2022 1319  Last data filed at 11/21/2022 1223  Gross per 24 hour   Intake 4245.43 ml   Output 1858 ml   Net 2387.43 ml             Labs:   Recent Labs     11/19/22  0501 11/20/22  0545 11/21/22  0537   WBC 13.9* 7.1 12.1*   HGB 12.2 12.5 10.4*   HCT 40.9 39.8 32.9*    161 137     Recent Labs     11/20/22  1755 11/21/22  0049 11/21/22  0537    144 144   K 3.6 4.1 4.1    105 105   CO2 25 28 27   BUN 10 9 8   CREATININE 0.6 0.5* 0.6   CALCIUM 7.7* 7.4* 7.7*   PHOS 2.8 2.8 2.8     Recent Labs     11/19/22  0501   AST 22   ALT 17   BILIDIR <0.2   BILITOT 0.5   ALKPHOS 79     Recent Labs     11/19/22  0830   INR 1.39*     Recent Labs     11/19/22  0501 11/19/22  0830 11/19/22  1408   TROPONINI <0.01 0.06* 0.05*       Urinalysis:      Lab Results   Component Value Date/Time    NITRU Negative 11/19/2022 06:00 AM    WBCUA 3-5 11/19/2022 06:00 AM    BACTERIA 1+ 11/19/2022 06:00 AM    RBCUA 0-2 11/19/2022 06:00 AM    BLOODU TRACE 11/19/2022 06:00 AM    SPECGRAV 1.045 11/19/2022 06:00 AM    GLUCOSEU Negative 11/19/2022 06:00 AM       Radiology:  XR CHEST PORTABLE   Final Result   Bilateral airspace disease, increased compared to prior         XR CHEST PORTABLE   Final Result   Bilateral airspace opacities right greater than left could represent diffuse   pulmonary edema or infection         CT CHEST PULMONARY EMBOLISM W CONTRAST   Final Result   1. No evidence of pulmonary embolism. 2.  Right mainstem intubation, for which retraction 3 cm is recommended. 3.  Enteric tube terminates in the stomach. 4.  Interstitial edema with small right effusion and trace left effusion. Bilateral airspace disease may represent a component of alveolar edema as   well as aspiration pneumonitis. CT HEAD WO CONTRAST   Final Result   No acute intracranial abnormality.                      Electronically signed by Manolo Garibay MD on 11/21/2022 at 1:19 PM

## 2022-11-21 NOTE — PROGRESS NOTES
Progress Note    Updates  Rewarmed this morning. Remains intubated, sedated, and on paralytic.       Current Facility-Administered Medications:     lubrifresh P.M. (artificial tears) ophthalmic ointment, , Both Eyes, 6 times per day, FARHAN Ortega CNP    insulin lispro (HUMALOG) injection vial 0-8 Units, 0-8 Units, SubCUTAneous, Q4H, FARHAN Allison CNP    metronidazole (FLAGYL) 500 mg in 0.9% NaCl 100 mL IVPB premix, 500 mg, IntraVENous, Q8H, FARHAN Allison - CNP    cefepime (MAXIPIME) 2000 mg IVPB minibag, 2,000 mg, IntraVENous, Once **FOLLOWED BY** cefepime (MAXIPIME) 2000 mg IVPB minibag, 2,000 mg, IntraVENous, Q8H, FARHAN Allison - CNP    oseltamivir 6mg/ml (TAMIFLU) suspension 75 mg, 75 mg, Oral, BID, Giorgio Kim MD, 75 mg at 11/21/22 0802    norepinephrine (LEVOPHED) 16 mg in dextrose 5% 250 mL infusion, 1-50 mcg/min, IntraVENous, Continuous, Giorgio Kim MD, Last Rate: 14.1 mL/hr at 11/21/22 0616, 15 mcg/min at 11/21/22 0616    enoxaparin (LOVENOX) injection 40 mg, 40 mg, SubCUTAneous, Daily, Teri Chow MD, 40 mg at 11/21/22 0802    propofol injection, 5-50 mcg/kg/min, IntraVENous, Continuous, Sylvia Ahmadi MD, Last Rate: 28.3 mL/hr at 11/21/22 0628, 50 mcg/kg/min at 11/21/22 0628    ondansetron (ZOFRAN-ODT) disintegrating tablet 4 mg, 4 mg, Oral, Q8H PRN **OR** ondansetron (ZOFRAN) injection 4 mg, 4 mg, IntraVENous, Q6H PRN, Moncho House DO    chlorhexidine (PERIDEX) 0.12 % solution 15 mL, 15 mL, Mouth/Throat, BID, Moncho House DO, 15 mL at 11/21/22 0802    perflutren lipid microspheres (DEFINITY) injection 1.5 mL, 1.5 mL, IntraVENous, ONCE PRN, Moncho M Harsh, DO    glucose chewable tablet 16 g, 4 tablet, Oral, PRN, Moncho M Harsh, DO    dextrose bolus 10% 125 mL, 125 mL, IntraVENous, PRN **OR** dextrose bolus 10% 250 mL, 250 mL, IntraVENous, PRN, Moncho IRMA ChewHarsh, DO    glucagon (rDNA) injection 1 mg, 1 mg, SubCUTAneous, PRN, Moncho Booker Ramus, DO    dextrose 10 % infusion, , IntraVENous, Continuous PRN, Moncho House DO    lactobacillus (CULTURELLE) capsule 2 capsule, 2 capsule, Oral, BID, Jenny Holden MD, 2 capsule at 11/21/22 0802    pantoprazole (PROTONIX) injection 40 mg, 40 mg, IntraVENous, Daily, Jenny Holden MD, 40 mg at 11/21/22 0751    mupirocin (BACTROBAN) 2 % ointment, , Topical, BID, Jenny Holden MD, Given at 11/21/22 0818    fentaNYL (SUBLIMAZE) 1,000 mcg in sodium chloride 0.9% 100 mL infusion,  mcg/hr, IntraVENous, Continuous, Last Rate: 5 mL/hr at 11/21/22 0616, 50 mcg/hr at 11/21/22 0616 **AND** fentaNYL (SUBLIMAZE) 1,000 mcg in sodium chloride 0.9% 100 mL infusion, 50 mcg, IntraVENous, Q30 Min PRN, Jenny Holden MD    levETIRAcetam (KEPPRA) 1000 mg/100 mL IVPB, 1,000 mg, IntraVENous, Q12H, Jenny Holden MD, Stopped at 11/21/22 0815    methylPREDNISolone sodium (SOLU-MEDROL) injection 40 mg, 40 mg, IntraVENous, Q12H, Jenny Holden MD, 40 mg at 11/21/22 0751    cisatracurium besylate (NIMBEX) 200 mg in sodium chloride 0.9 % 100 mL infusion, 0.5-10 mcg/kg/min, IntraVENous, Continuous, Jenny Holden MD, Last Rate: 7.1 mL/hr at 11/21/22 0616, 2.5 mcg/kg/min at 11/21/22 0676    Current Outpatient Medications on File Prior to Encounter   Medication Sig    amLODIPine (NORVASC) 5 MG tablet Take 5 mg by mouth daily    levETIRAcetam (KEPPRA) 500 MG tablet Take 500 mg by mouth 2 times daily     Exam  Blood pressure (!) 106/59, pulse 87, temperature 98.6 °F (37 °C), temperature source Bladder, resp. rate 24, height 5' 3\" (1.6 m), weight 207 lb 10.8 oz (94.2 kg), SpO2 100 %. Constitutional    Vital signs: BP, HR, and RR reviewed   General depressed mental status, intubated. Eyes: unable to visualize the fundi. Cardiovascular: no peripheral edema. Psychiatric: limited exam given encephalopathy   Neurologic  Mental status:   orientation quang due to mental status.     Attention poor  Language quang due to mental status. Comprehension not following any commands  CN2: unable to accurately assess. CN 3,4,6: gaze appears conjugate. No forced deviation or preference. Pupils are pinpoint. CN7: face symmetric but exam limited by ET tube  CN8: hearing exam limited due to encephalopathy. CN12:limited exam given encephalopathy  Strength: quang due to mental status. Sensory: no reaction to any sort of noxious pain stimulus. Cerebellar/coordination: limited exam given encephalopathy  Tone: no increased tone or rigidity. Gait: limited exam given encephalopathy and intubated with ventilator. ROS - chart reviewed. Labs  Glucose 114  Na 144  K 4.1  BUN 8  Cr 0.6  Mg 2.30  Lactic acid 2.2    WBC 12.1K  Hg 10.4  Platelets 022    Blood cultures NG  COVID negative  UA no infection  Flu A positive    Drug screen negative    Studies  CT head w/o 11/19/22, independently reviewed  Impression   No acute intracranial abnormality. Impression  S/p PEA arrest.  Hypothermia protocol completed. Encephalopathy, currently sedated and on paralytics. Influenza. Reported myoclonic jerking. 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 Recommendations  Still unable to give neurologic prognosis w/ any certainly. Paralytic and sedating medications to be stopped/weaned when able. EEG to be attempted tomorrow. Continue Keppra. May need MRI brain w/o in the next couple of days. Continue supportive care. Will follow.       Miguelina Yousif NP  [address] [place] [address] [address] Neurology    A copy of this note was provided for Dr Shanti Quiroga MD

## 2022-11-21 NOTE — PROGRESS NOTES
Pt desat alarm went off at 1729, respiratory called, Davy, bagged pt for 2 minutes. Pt saturation 100% after bagging. At 1738 pt desated into 70's again, respiratory called, Antoinette Solano came and adjusted vent settings. Pt currently sating 95 at 1751. Ventilator settings AC/PC, 20/15, FiO2 100%. Patient peak airway pressures 38. Patient saturating 93% at 1758. Critical care updated, ABG due at 8 pm, will wait for next ABG results per Lizabeth Leblanc CNP.

## 2022-11-21 NOTE — PROGRESS NOTES
This RN attempted to call cousin, Karen Rogers, to give an update and ask about the pt's allergy history with no answer.

## 2022-11-21 NOTE — PROGRESS NOTES
Cardiac Electrophysiology Progress Note     Admit Date: 2022     Reason for follow up: PEA arrest    HPI and Interval History:   Alan Álvarez is a 52 y.o. with past medical history of seizure disorder, HTN, HLD, and learning disability. Female hospitalized on 2022   after cardiac arrest.  Patient called EMS for flulike symptoms. She developed altered mental status while on the ambulance. In the ED she underwent CPR with ROSC achieved approximately 10 minutes. Initial rhythm PEA. 22: patient was in hypothermia protocol and prone position with sedation and paralytics on board  22: Patient has been rewarmed to normothermia this morning. Patient in prone position and is sedated, on paralytics. Physical Examination:  Vitals:    22 0805   BP:    Pulse: 87   Resp: 24   Temp:    SpO2: 100%        Intake/Output Summary (Last 24 hours) at 2022 0823  Last data filed at 2022 8050  Gross per 24 hour   Intake 4925.66 ml   Output 2158 ml   Net 2767.66 ml     In: 4985.7 [I.V.:3875.1; NG/GT:160]  Out: 2308    Wt Readings from Last 3 Encounters:   22 207 lb 10.8 oz (94.2 kg)     Temp  Av.9 °F (36.1 °C)  Min: 92.8 °F (33.8 °C)  Max: 99 °F (37.2 °C)  Pulse  Av.4  Min: 62  Max: 99  BP  Min: 95/51  Max: 126/62  SpO2  Av.1 %  Min: 86 %  Max: 100 %  FiO2   Av %  Min: 100 %  Max: 100 %    Telemetry: Sinus rhythm in the 80s  Constitutional: Intubated and sedated/paralyzed. Ill-appearing. Head: Normocephalic and atraumatic. Eyes: Conjunctivae normal. EOM are normal.   Neck: Neck supple. No lymphadenopathy. No rigidity. No JVD present. Cardiovascular: Normal rate, regular rhythm. No murmurs, rubs or gallops. No S3 or S4.  Pulmonary/Chest: Diminished breath sounds bilaterally. No crackles, wheezes or rhonchi. No respiratory accessory muscle use. Abdominal: Soft. Normal bowel sounds present. No distension, No tenderness. Musculoskeletal: No tenderness. No edema    Lymphadenopathy: Has no cervical adenopathy. Neurological: Intubated, sedated and paralyzed. Skin: Skin is warm and dry. No rash, lesions, ulcerations noted. Psychiatric: No anxiety or agitation. Labs, diagnostic and imaging results reviewed. Reviewed. Recent Labs     22  1755 22  0049 22  0537    144 144   K 3.6 4.1 4.1    105 105   CO2 25 28 27   PHOS 2.8 2.8 2.8   BUN 10 9 8   CREATININE 0.6 0.5* 0.6     Recent Labs     22  0501 22  0545 22  0537   WBC 13.9* 7.1 12.1*   HGB 12.2 12.5 10.4*   HCT 40.9 39.8 32.9*   MCV 81.3 76.4* 76.3*    161 137     Lab Results   Component Value Date/Time    TROPONINI 0.05 2022 02:08 PM     Estimated Creatinine Clearance: 126 mL/min (based on SCr of 0.6 mg/dL). No results found for: BNP  Lab Results   Component Value Date/Time    PROTIME 17.0 2022 08:30 AM    INR 1.39 2022 08:30 AM     No results found for: CHOL, HDL, TRIG    Scheduled Meds:   oseltamivir 6mg/ml  75 mg Oral BID    enoxaparin  40 mg SubCUTAneous Daily    chlorhexidine  15 mL Mouth/Throat BID    piperacillin-tazobactam  3,375 mg IntraVENous Q8H    lactobacillus  2 capsule Oral BID    pantoprazole  40 mg IntraVENous Daily    mupirocin   Topical BID    levETIRAcetam  1,000 mg IntraVENous Q12H    methylPREDNISolone  40 mg IntraVENous Q12H     Continuous Infusions:   norepinephrine 15 mcg/min (22)    propofol 50 mcg/kg/min (22)    insulin 0.71 Units/hr (22)    dextrose      fentaNYL 50 mcg/hr (22)    cisatracurium (NIMBEX) infusion 2.5 mcg/kg/min (22)    sodium bicarbonate infusion 100 mL/hr at 22     PRN Meds:ondansetron **OR** ondansetron, perflutren lipid microspheres, glucose, dextrose bolus **OR** dextrose bolus, glucagon (rDNA), dextrose, fentaNYL **AND** fentaNYL     EC22  Normal sinus rhythm. Prolonged QT. Abnormal ECG.     Echo:2022 Conclusions      Summary   There is concentric left ventricular hypertrophy. Overall left ventricular systolic function appears normal with an estimated   ejection fraction of 55%. No regional wall motion abnormalities are noted. Diastolic function is indeterminate. There is at least moderate mitral regurgitation present. .   Trivial pericardial effusion noted near right ventricle in subcostal view. The right ventricle is normal in size. and function   IVC size is normal (<2.1 cm) but collapses < 50% with respiration consistent   with elevated RA pressure (8 mmHg). Estimated pulmonary artery systolic   pressure is at 33 mmHg assuming a right atrial pressure of 8 mmHg. Assessment and Plan:     Bonnie Real is a 52 y.o. with past medical history of seizure disorder, HTN, HLD, and learning disability. Female hospitalized on 11/19/2022  after cardiac arrest.    PEA Arrest   - PEA arrest most likely from respiratory arrest from severe pneumonia    - no reports of ventricular arrhythmias   - s/p hypothermia protocol, now normothermia    Prolonged QT    -Prolonged QT most likely from cardiac arrest    - Avoid QT prolonging drugs    -Monitor electrolytes   -optimized electrolytes keep K above 4 and Mg above 2   - Echocardiogram showed normal LV and RC size and systolic function. Normal PA pressure   - repeat EKG today    Aspiration Pneumonia/ARDS   -Continue Abx and Tamiflu   -Ventilator weaning per critical care    Influenza   - on Tamiflu    Septic shock   - secondary to above   - remains on Levophed   - EF normal on echo    Anoxic encephalopathy    -Neurology following    If QT WNL on repeat EKG, will sign off. Please call if concerns.     FARHAN Mulligan  [facility], [address], [address] [facility]  Phone: [phone]  Fax: [phone]    Electronically signed by Hollie Wells on 11/21/2022 at 8:23 AM

## 2022-11-21 NOTE — PROGRESS NOTES
"Subjective:      Antoni Martínez is a 12 m.o. male who presents with Other            HPI Comments: Hx provided by MGM & mother. Pt presents with recent h/o diarrhea on Sat/Sun that has since resolved. Solid/formed stool this am. No fever. No emesis. Tolerating PO. Pt with diaper rash to the buttocks. Mom has been applying Aquaphor to the area with some improvement. No known ill contacts at home.    Meds: None    Past Medical History:    Healthy pediatric patient                                     Renal disorder                                                Bowel habit changes                                             Comment:diarrhea    Allergies as of 07/10/2017 - Sacha as Reviewed 07/10/2017   -- Tape --  -- noted 2016        Other  Associated symptoms include a rash. Pertinent negatives include no fever, nausea or vomiting.       Review of Systems   Constitutional: Negative for fever.   Gastrointestinal: Positive for diarrhea. Negative for nausea and vomiting.   Skin: Positive for rash.          Objective:     Pulse 120  Temp(Src) 36.6 °C (97.8 °F)  Resp 30  Ht 0.787 m (2' 7\")  Wt 10.705 kg (23 lb 9.6 oz)  BMI 17.28 kg/m2     Physical Exam   Constitutional: He appears well-developed and well-nourished. He is active.   HENT:   Right Ear: Tympanic membrane normal.   Left Ear: Tympanic membrane normal.   Nose: No nasal discharge.   Mouth/Throat: Mucous membranes are moist. Oropharynx is clear.   Eyes: Conjunctivae and EOM are normal. Pupils are equal, round, and reactive to light. Right eye exhibits no discharge. Left eye exhibits no discharge.   Neck: Normal range of motion. Neck supple.   Cardiovascular: Normal rate and regular rhythm.    Pulmonary/Chest: Effort normal and breath sounds normal.   Abdominal: Soft. He exhibits no distension and no mass. There is no hepatosplenomegaly. There is no tenderness. There is no rebound and no guarding. No hernia.   Lymphadenopathy:     He " Pt's urine output suddenly decreased. Attempted to flush the baugh catheter multiple times with no success (lauren syringe and flush port). Replaced baugh catheter with a new one. 100 mL output immediately. The catheter that was removed was clogged with sediment. has no cervical adenopathy.   Neurological: He is alert.   Skin: Skin is warm. Capillary refill takes less than 3 seconds. Rash noted.   Pt with erythema to the B buttocks, mild excoriation. No active discharge or bleeding.    Vitals reviewed.              Assessment/Plan:     1. Diaper rash  Instructed parent to apply barrier cream with zinc oxide to the buttocks for prevention of breakdown. May then apply Aquaphor or vaseline on top of the barrier cream. With each diaper change, attempt to only wipe away the lubricant, leaving the barrier in place for optimal skin protection. At least once daily, wipe away all cream products & start fresh. RTC for any skin breakdown/excoriation, inflammation, increasing pain, fever >101.5, or other concerns.

## 2022-11-21 NOTE — PLAN OF CARE
Problem: Safety - Violent/Self-destructive Restraint  Goal: Remains free of injury from restraints (Restraint for Violent/Self-Destructive Behavior)  Description: INTERVENTIONS:  1. Determine that de-escalation and other, less restrictive measures have been tried or would not be effective before applying the restraint  2. Identify and document the criteria for restraint  3. Evaluate the patient's condition at the time of restraint application  4. Inform patient/family regarding the reason for restraint/seclusion  5. Q2H: Monitor comfort, nutrition and hydration needs  6. Q15M: Perform safety checks including skin, circulation, sensory, respiratory and psychological status  7. Ensure continuous observation  8. Identify and implement measures to help patient regain control, assess readiness for release and initiate progressive release per policy  [id number] by Daphine Mcburney, RN  Outcome: Progressing     Problem: Safety - Medical Restraint  Goal: Remains free of injury from restraints (Restraint for Interference with Medical Device)  Description: INTERVENTIONS:  1. Determine that other, less restrictive measures have been tried or would not be effective before applying the restraint  2. Evaluate the patient's condition at the time of restraint application  3. Inform patient/family regarding the reason for restraint  4.  Q2H: Monitor safety, psychosocial status, comfort, nutrition and hydration  11/21/2022 0112 by Daphine Mcburney, RN  Outcome: Progressing     Problem: Discharge Planning  Goal: Discharge to home or other facility with appropriate resources  11/21/2022 0112 by Daphine Mcburney, RN  Outcome: Progressing     Problem: Nutrition Deficit:  Goal: Optimize nutritional status  11/21/2022 0112 by Daphine Mcburney, RN  Outcome: Progressing     Problem: Pain  Goal: Verbalizes/displays adequate comfort level or baseline comfort level  11/21/2022 0112 by Daphine Mcburney, RN  Outcome: Progressing     Problem: Skin/Tissue Integrity  Goal: Absence of new skin breakdown  Description: 1. Monitor for areas of redness and/or skin breakdown  2. Assess vascular access sites hourly  3. Every 4-6 hours minimum:  Change oxygen saturation probe site  4. Every 4-6 hours:  If on nasal continuous positive airway pressure, respiratory therapy assess nares and determine need for appliance change or resting period.   11/21/2022 0112 by Jose Lowry RN  Outcome: Progressing     Problem: ABCDS Injury Assessment  Goal: Absence of physical injury  11/21/2022 0112 by Jose Lowry RN  Outcome: Progressing

## 2022-11-21 NOTE — PROGRESS NOTES
Not able to see Pt for EEG TEST, S/W patients RN. Pt is on Heavy Sedation and Continuous Paralytic.  Try tomorrow

## 2022-11-21 NOTE — PLAN OF CARE
Problem: Discharge Planning  Goal: Discharge to home or other facility with appropriate resources  Outcome: Progressing  Flowsheets (Taken 11/21/2022 0830 by Ben Escalante RN)  Discharge to home or other facility with appropriate resources: Identify barriers to discharge with patient and caregiver     Problem: Nutrition Deficit:  Goal: Optimize nutritional status  Outcome: Progressing     Problem: Pain  Goal: Verbalizes/displays adequate comfort level or baseline comfort level  Outcome: Progressing     Problem: Skin/Tissue Integrity  Goal: Absence of new skin breakdown  Description: 1. Monitor for areas of redness and/or skin breakdown  2. Assess vascular access sites hourly  3. Every 4-6 hours minimum:  Change oxygen saturation probe site  4. Every 4-6 hours:  If on nasal continuous positive airway pressure, respiratory therapy assess nares and determine need for appliance change or resting period.   Outcome: Progressing     Problem: ABCDS Injury Assessment  Goal: Absence of physical injury  Outcome: Progressing  Flowsheets (Taken 11/21/2022 1525)  Absence of Physical Injury: Implement safety measures based on patient assessment     Problem: Safety - Adult  Goal: Free from fall injury  Outcome: Progressing

## 2022-11-22 NOTE — PROGRESS NOTES
Patient placed in prone position @ 2200. Patient tolerating well with oxygen saturation in the mid 90s. Will recheck ABG @ 2330.

## 2022-11-22 NOTE — CONSULTS
[facility]  Palliative Care   Consult Note    NAME:  Eric Melgar  MEDICAL RECORD NUMBER:  [id number]  AGE: 52 y.o. GENDER: female  : 1975  TODAY'S DATE:  2022    Subjective     Reason for Consult:  goals of care and family support   Visit Type: Initial Consult      Eric Melgar is a 52 y.o. female referred by:   [x] Physician    PAST MEDICAL HISTORY  No past medical history on file. PAST SURGICAL HISTORY  No past surgical history on file. FAMILY HISTORY  No family history on file. SOCIAL HISTORY       ALLERGIES  Allergies   Allergen Reactions    Penicillins Anaphylaxis and Hives       MEDICATIONS  No current facility-administered medications on file prior to encounter. Current Outpatient Medications on File Prior to Encounter   Medication Sig Dispense Refill    amLODIPine (NORVASC) 5 MG tablet Take 5 mg by mouth daily      levETIRAcetam (KEPPRA) 500 MG tablet Take 500 mg by mouth 2 times daily         Objective         BP (!) 110/57   Pulse 91   Temp 98.7 °F (37.1 °C) (Bladder)   Resp 24   Ht 5' 3\" (1.6 m)   Wt 225 lb 1.4 oz (102.1 kg) Comment: cooling pads on  SpO2 94%   BMI 39.87 kg/m²     Code Status: Full Code    Advanced Directives: not completed, next of kin is sister Chelita Velazquez        Management and Education    Persons available for education:        [] Self     [] Caregiver       [] Spouse       [x] Other Family Member   []  Other    Spiritual History:  notified: Yes,     Does the patient have a Primary Care Physician? Yes    Palliative Performance Scale:  60% [] Ambulation reduced; Significant disease; Can't do hobbies/housework; intake normal or reduced; occasional assist; LOC full/confusion  50% [] Mainly sit/lie;  Extensive disease; Can't do any work; Considerable assist; intake normal or reduced; LOC full/confusion  40% [] Mainly in bed; Extensive disease; Mainly assist; intake normal or reduced; occasional assist; LOC full/confusion  30% [] Bed Bound; Extensive disease; Total care; intake reduced; LOC full/confusion  20% [x] Bed Bound; Extensive disease; Total care; intake minimal; Drowsy/coma  10% [] Bed Bound; Extensive disease; Total care; Mouth care only; Drowsy/coma  0 [] Death    Level of patient/caregiver understanding able to:        [] Verbalize Understanding   [] Demonstrate Understanding       [] Teach Back       [x] Needs Reinforcement     []  Other:      Teaching Time:  1hours  0 minutes     Plan        Social Service Consult Made:  Yes  Assistance filling out Living Will/HPOA:  No      Discharge Plan:  Education/support to family  Providing support for coping/adaptation/distress of family  Clarification of medical condition to patient and family  Code status clarified: Full Code  Palliative care orders introduced    Discharge Environment:  [x] Other:     Discussion: Patient admitted after calling EMS for flu-like symptoms, vomited en route to hospital noted to be in PEA upon arrival to ED ROSC after approx 10 min. She is currently on vent FIO2 100EEP at 15,  proned, pressors, sedation and paralyzed. I have called her aunt, Stephani Hidden she gives a history of Coral Bee living with her cousin currently she has always lived with a family member. Kaushik Valenzuela states at one time she had her evaluated at [facility], she at mental capacity of 15year old. I was able to find out her mother is dead, she has one sister Tommy Alegria. I have spoken to [facility], she wants to continue with all aggressive care. I did explain her sister is critically ill at this time. She was given number to ICU to call and check on her sister. I will continue to follow Ms. Mcnamara's care as needed. Thank you for allowing me to participate in the care of Ms. Kayla Man .      Electronically signed by Michael Martin RN, BSN, [facility] on 11/22/2022 at 11:56 AM  [address] [place] [address]  Office: [phone]

## 2022-11-22 NOTE — PLAN OF CARE
Problem: Safety - Violent/Self-destructive Restraint  Goal: Remains free of injury from restraints (Restraint for Violent/Self-Destructive Behavior)  Description: INTERVENTIONS:  1. Determine that de-escalation and other, less restrictive measures have been tried or would not be effective before applying the restraint  2. Identify and document the criteria for restraint  3. Evaluate the patient's condition at the time of restraint application  4. Inform patient/family regarding the reason for restraint/seclusion  5. Q2H: Monitor comfort, nutrition and hydration needs  6. Q15M: Perform safety checks including skin, circulation, sensory, respiratory and psychological status  7. Ensure continuous observation  8. Identify and implement measures to help patient regain control, assess readiness for release and initiate progressive release per policy  [id number]/43/[id number] by Pablo Alfonso RN  Outcome: Progressing     Problem: Safety - Medical Restraint  Goal: Remains free of injury from restraints (Restraint for Interference with Medical Device)  Description: INTERVENTIONS:  1. Determine that other, less restrictive measures have been tried or would not be effective before applying the restraint  2. Evaluate the patient's condition at the time of restraint application  3. Inform patient/family regarding the reason for restraint  4.  Q2H: Monitor safety, psychosocial status, comfort, nutrition and hydration  11/22/2022 0412 by Pablo Alfonso RN  Outcome: Progressing     Problem: Discharge Planning  Goal: Discharge to home or other facility with appropriate resources  11/22/2022 0412 by Pablo Alfonso RN  Outcome: Progressing     Problem: Nutrition Deficit:  Goal: Optimize nutritional status  11/22/2022 0412 by Pablo Alfonso RN  Outcome: Progressing     Problem: Pain  Goal: Verbalizes/displays adequate comfort level or baseline comfort level  11/22/2022 0412 by Pablo Alfonso RN  Outcome: Progressing     Problem: Skin/Tissue Integrity  Goal: Absence of new skin breakdown  Description: 1. Monitor for areas of redness and/or skin breakdown  2. Assess vascular access sites hourly  3. Every 4-6 hours minimum:  Change oxygen saturation probe site  4. Every 4-6 hours:  If on nasal continuous positive airway pressure, respiratory therapy assess nares and determine need for appliance change or resting period.   11/22/2022 0412 by Ledy Urbano RN  Outcome: Progressing     Problem: ABCDS Injury Assessment  Goal: Absence of physical injury  11/22/2022 0412 by Ledy Urbano RN  Outcome: Progressing     Problem: Safety - Adult  Goal: Free from fall injury  11/22/2022 0412 by Ledy Urbano RN  Outcome: Progressing

## 2022-11-22 NOTE — PLAN OF CARE
Problem: Safety - Violent/Self-destructive Restraint  Goal: Remains free of injury from restraints (Restraint for Violent/Self-Destructive Behavior)  Description: INTERVENTIONS:  1. Determine that de-escalation and other, less restrictive measures have been tried or would not be effective before applying the restraint  2. Identify and document the criteria for restraint  3. Evaluate the patient's condition at the time of restraint application  4. Inform patient/family regarding the reason for restraint/seclusion  5. Q2H: Monitor comfort, nutrition and hydration needs  6. Q15M: Perform safety checks including skin, circulation, sensory, respiratory and psychological status  7. Ensure continuous observation  8. Identify and implement measures to help patient regain control, assess readiness for release and initiate progressive release per policy  42/12/[id number] by Avery Abdul RN  Outcome: Not Progressing    Problem: Safety - Medical Restraint  Goal: Remains free of injury from restraints (Restraint for Interference with Medical Device)  Description: INTERVENTIONS:  1. Determine that other, less restrictive measures have been tried or would not be effective before applying the restraint  2. Evaluate the patient's condition at the time of restraint application  3. Inform patient/family regarding the reason for restraint  4.  Q2H: Monitor safety, psychosocial status, comfort, nutrition and hydration  11/22/2022 1331 by Avery Abdul RN  Outcome: Not Progressing     Problem: Discharge Planning  Goal: Discharge to home or other facility with appropriate resources  11/22/2022 1331 by Avery Abdul RN  Outcome: Not Progressing  Flowsheets (Taken 11/22/2022 0830)  Discharge to home or other facility with appropriate resources:   Identify barriers to discharge with patient and caregiver   Arrange for needed discharge resources and transportation as appropriate     Problem: Nutrition Deficit:  Goal: Optimize nutritional status  11/22/2022 1331 by Redd Perla RN  Outcome: Not Progressing     Problem: Pain  Goal: Verbalizes/displays adequate comfort level or baseline comfort level  11/22/2022 1331 by Redd Perla RN  Outcome: Not Progressing    Problem: Skin/Tissue Integrity  Goal: Absence of new skin breakdown  Description: 1. Monitor for areas of redness and/or skin breakdown  2. Assess vascular access sites hourly  3. Every 4-6 hours minimum:  Change oxygen saturation probe site  4. Every 4-6 hours:  If on nasal continuous positive airway pressure, respiratory therapy assess nares and determine need for appliance change or resting period.   11/22/2022 1331 by Redd Perla RN  Outcome: Not Progressing    Problem: ABCDS Injury Assessment  Goal: Absence of physical injury  11/22/2022 1331 by Redd Perla RN  Outcome: Not Progressing  Flowsheets (Taken 11/22/2022 1329)  Absence of Physical Injury: Implement safety measures based on patient assessment    Problem: Safety - Adult  Goal: Free from fall injury  11/22/2022 1331 by Redd Perla RN  Outcome: Not Progressing

## 2022-11-22 NOTE — PROGRESS NOTES
Critical care team made aware of ABG results and patient's desaturation episode sustaining in low 80s. Orders from Maynor Fontanez CNP to prone patient for 16hrs and keep sedated and paralyzed.

## 2022-11-22 NOTE — PROGRESS NOTES
Pulmonary Progress Note    CC:  Follow up cardiac arrest, ARDS, influenza    Subjective:  FIO2 at 100% and PEEP at 15  Placed supine yesterday but desaturated into the 70's and was placed back prone  Remains on pressors       Intake/Output Summary (Last 24 hours) at 11/22/2022 0724  Last data filed at 11/22/2022 0600  Gross per 24 hour   Intake 2549.01 ml   Output 1127 ml   Net 1422.01 ml           PHYSICAL EXAM:  Blood pressure (!) 110/57, pulse 72, temperature 99.3 °F (37.4 °C), temperature source Bladder, resp. rate 24, height 5' 3\" (1.6 m), weight 225 lb 1.4 oz (102.1 kg), SpO2 98 %.'  Gen: Prone   Eyes: Prone, mild conjunctival injection   ENT: No discharge. Pharynx with ETT and gastric tube  Neck: Trachea midline. No obvious mass. Resp: Diminished  CV: Regular rate. Regular rhythm. No murmur or rub. GI: Prone   Skin: Edematous   Lymph: No cervical LAD. No supraclavicular LAD. M/S: No cyanosis. No clubbing. No joint deformity.     Neuro: Paralyzed  Ext:   no edema    Medications:    Scheduled Meds:   artificial tears   Both Eyes 6 times per day    metroNIDAZOLE  500 mg IntraVENous Q8H    cefepime  2,000 mg IntraVENous Q8H    insulin lispro  0-8 Units SubCUTAneous Q4H    oseltamivir 6mg/ml  75 mg Oral BID    enoxaparin  40 mg SubCUTAneous Daily    chlorhexidine  15 mL Mouth/Throat BID    lactobacillus  2 capsule Oral BID    pantoprazole  40 mg IntraVENous Daily    mupirocin   Topical BID    levETIRAcetam  1,000 mg IntraVENous Q12H    methylPREDNISolone  40 mg IntraVENous Q12H       Continuous Infusions:   norepinephrine 8 mcg/min (11/22/22 0558)    propofol 50 mcg/kg/min (11/22/22 0558)    dextrose      fentaNYL 50 mcg/hr (11/22/22 0558)    cisatracurium (NIMBEX) infusion 3 mcg/kg/min (11/22/22 0558)       PRN Meds:  acetaminophen **OR** acetaminophen, ondansetron **OR** ondansetron, perflutren lipid microspheres, glucose, dextrose bolus **OR** dextrose bolus, glucagon (rDNA), dextrose, fentaNYL **AND** fentaNYL    Labs:  CBC:   Recent Labs     22  0545 22  0537 22  0431   WBC 7.1 12.1* 13.0*   HGB 12.5 10.4* 9.8*   HCT 39.8 32.9* 30.8*   MCV 76.4* 76.3* 74.9*    137 146       BMP:   Recent Labs     22  0049 22  0537 22  0431    144 141   K 4.1 4.1 3.8    105 103   CO2 28 27 26   PHOS 2.8 2.8 1.9*   BUN 9 8 14   CREATININE 0.5* 0.6 0.5*       LIVER PROFILE:   No results for input(s): AST, ALT, LIPASE, BILIDIR, BILITOT, ALKPHOS in the last 72 hours. Invalid input(s): AMYLASE,  ALB    PT/INR:   Recent Labs     22  0830   PROTIME 17.0*   INR 1.39*       APTT:   Recent Labs     22  1408 22  2247 22  0545   APTT 71.4* 112.1* 104.9*       UA:  No results for input(s): NITRITE, COLORU, PHUR, LABCAST, WBCUA, RBCUA, MUCUS, TRICHOMONAS, YEAST, BACTERIA, CLARITYU, SPECGRAV, LEUKOCYTESUR, UROBILINOGEN, BILIRUBINUR, BLOODU, GLUCOSEU, AMORPHOUS in the last 72 hours. Invalid input(s): Judeth Plate    No results for input(s): PH, PCO2, PO2 in the last 72 hours. Films:  Chest imaging reports were reviewed and imaging was reviewed by me and showed ETT and gastric tube in place. Diffuse airspace disease bilaterally     AB.5/38/120    Cultures:  Sputum;  Normal wilma   Blood:  NGTD    I reviewed the labs and images listed above    Assessment/Plan:   Cardiopulmonary Arrest PEA  Inspiratory pressure down to 17 and PEEP down to 14 since plateau pressures were 36  Serial ABG  Have to keep sedation for RASS goal of -5 while paralyzed   Shock, likely cardiogenic and septic   Levophed for MAP of 65  Cefepime and Flagyl. Outside chart says she has anaphylaxis to PCN. [address] [place] [address] Street pointed this out.     Cultures NGTD  Trend procal   ARDS  Vent protection strategy   Ok for pH of 7.2, PaO2 of 55 and saturation of 88%  Start lasix 40 mg bid  Influenza A  Tamiflu as ordered  Ok with solumedrol for now   Anoxic Encephalopathy   Unable to follow up with repeat imaging due to instability   Neuro exam limited due to ongoing paralysis   Myoclonus   [facility]  Neurology following     Has mental capacity of a 15year old per family     Medium scale insulin   Keep NPO until she can maintain supine status     GI prophylaxis  Protonix  DVT prophylaxis  Lovenox     Guarded prognosis. Critical care time of 31 minutes. This does not include procedural time. Please see procedural notes for details.     Kary Loja, DO [facility]

## 2022-11-22 NOTE — PROGRESS NOTES
Temperature sensing baugh reading 100.0 degrees fahrenheit. RN took rectal temp to compare and got 99.3 degrees. Received PRN tylenol orders from [facility], [place]. RN gave dose per orders via OGT.

## 2022-11-22 NOTE — PROGRESS NOTES
0215: pts head rotated to Left; ETT remains in place. 0400: pts head rotated to Right; ETT remains in place. 0610:pts head rotated to Left; ETT remains in place.

## 2022-11-22 NOTE — PROGRESS NOTES
Pt proned due to ABG results per Laila Gonzalez; darryl changed to comfit; pt proned without complications; will continue to monitor.

## 2022-11-22 NOTE — PROGRESS NOTES
Hospitalist Progress Note    Patient:  Ammon Wilson  Unit/Bed:[id number]-[id number]/2111-01   YOB: 1975       MRN: [id number] Acct: [de-identified]  PCP: FARHAN Mckeon CNP    Date of Admission: 11/19/2022  --------------------------    Chief Complaint:      Cardiac arrest    Interval history    Ammon Wilson is a 52 y.o. female hospitalized on 11/19/2022   after cardiac arrest.  Patient called EMS for flulike symptoms. She developed altered mental status while on the ambulance. In the ED she underwent CPR with ROSC achieved approximately 10 minutes. Initial rhythm PEA. Assessment/plan:      Cardiopulmonary arrest with ROSC approximately after 10 minutes of resuscitation. Initial rhythm was PEA. Influenza A  Severe acute hypoxic respiratory failure,/ARDS CTA of the chest showed no pulmonary embolism  Acute toxic metabolic encephalopathy  Myoclonic jerks/seizure ? Prior history of seizures  Pulmonary edema, echocardiogram reviewed. Normal EF. Aspiration pneumonia  Hyperglycemia ?   ,  A1c 6. Nonzero troponinemia, multifactorial  Obesity      PLAN    Neuro:  -Rewarming process completed 11/20/2022  -Neurology input appreciated, EEG after removal of paralytics  -Continue paralytic agents, sedation per ICU team for vent synchrony. Pulm:  -Remain intubated  -Continue paralytics for vent synchrony per ICU team  -Continue broad broad-spectrum antibiotics and Tamiflu    CVS:  -Echocardiogram reviewed, normal systolic function,   -Continue vasopressor support as required. Renal:  Creatinine within reasonable range, continue monitoring, monitor urine output. Monitor and replete electrolyte. Patient is going to get IV Lasix today    GI:  -GI prophylaxis, tube feed per ICU team maintain Tamiflu broad-spectrum antibiotics for now.     ID:  -On cefepime and Tamiflu    Endocrine:  -Off insulin drip and currently on sliding scale insulin    Hem/Onc:  -Monitor CBC       Skin: Continue skin care     Code Status: Full Code     DVT prophylaxis: Lovenox     Disposition: Continue ICU care    Nutrition. Currently patient is in p.o. with OG tube. The patient remain n.p.o. tomorrow then we will discuss with critical team regarding tube feeds versus     ----------------      Subjective:     Discussed with the nursing staff. The patient remained paralyzed and on pressors. Diet: Diet NPO    OBJECTIVE     Exam:  BP (!) 110/57   Pulse 71   Temp 98.7 °F (37.1 °C) (Bladder)   Resp 24   Ht 5' 3\" (1.6 m)   Wt 225 lb 1.4 oz (102.1 kg) Comment: cooling pads on  SpO2 95%   BMI 39.87 kg/m²        Gen: Sedated  Head: Normocephalic. Atraumatic. ENT: ETT and tube feeding  in place, oral feeding tube in place  CVS: Regular rate and rhythm  Pulmomary: Likely in prone position. Mildly tachycardic  Gastrointestinal: Cannot be assessed the patient is in prone position  Musculoskeletal: No edema.  Warm  Neuro: Sedated, unable to assess  Psychiatry:  unable to assess       Medications:  Reviewed    Infusion Medications    norepinephrine 8 mcg/min (11/22/22 0558)    propofol 50 mcg/kg/min (11/22/22 0853)    dextrose      fentaNYL 50 mcg/hr (11/22/22 0558)    cisatracurium (NIMBEX) infusion 3 mcg/kg/min (11/22/22 0558)     Scheduled Medications    furosemide  40 mg IntraVENous BID    calcium gluconate-NaCl  1,000 mg IntraVENous Once    potassium phosphate IVPB  20 mmol IntraVENous Once    artificial tears   Both Eyes 6 times per day    metroNIDAZOLE  500 mg IntraVENous Q8H    cefepime  2,000 mg IntraVENous Q8H    insulin lispro  0-8 Units SubCUTAneous Q4H    oseltamivir 6mg/ml  75 mg Oral BID    enoxaparin  40 mg SubCUTAneous Daily    chlorhexidine  15 mL Mouth/Throat BID    lactobacillus  2 capsule Oral BID    pantoprazole  40 mg IntraVENous Daily    mupirocin   Topical BID    levETIRAcetam  1,000 mg IntraVENous Q12H    methylPREDNISolone  40 mg IntraVENous Q12H     PRN Meds: acetaminophen **OR** acetaminophen, ondansetron **OR** ondansetron, perflutren lipid microspheres, glucose, dextrose bolus **OR** dextrose bolus, glucagon (rDNA), dextrose, fentaNYL **AND** fentaNYL      Intake/Output Summary (Last 24 hours) at 11/22/2022 0955  Last data filed at 11/22/2022 5619  Gross per 24 hour   Intake 2469.01 ml   Output 1207 ml   Net 1262.01 ml             Labs:   Recent Labs     11/20/22  0545 11/21/22  0537 11/22/22  0431   WBC 7.1 12.1* 13.0*   HGB 12.5 10.4* 9.8*   HCT 39.8 32.9* 30.8*    137 146     Recent Labs     11/21/22  0049 11/21/22  0537 11/22/22  0431    144 141   K 4.1 4.1 3.8    105 103   CO2 28 27 26   BUN 9 8 14   CREATININE 0.5* 0.6 0.5*   CALCIUM 7.4* 7.7* 7.4*   PHOS 2.8 2.8 1.9*     No results for input(s): AST, ALT, BILIDIR, BILITOT, ALKPHOS in the last 72 hours. No results for input(s): INR in the last 72 hours. Recent Labs     11/19/22  1408   TROPONINI 0.05*       Urinalysis:      Lab Results   Component Value Date/Time    NITRU Negative 11/19/2022 06:00 AM    WBCUA 3-5 11/19/2022 06:00 AM    BACTERIA 1+ 11/19/2022 06:00 AM    RBCUA 0-2 11/19/2022 06:00 AM    BLOODU TRACE 11/19/2022 06:00 AM    SPECGRAV 1.045 11/19/2022 06:00 AM    GLUCOSEU Negative 11/19/2022 06:00 AM       Radiology:  XR CHEST PORTABLE   Final Result   Bilateral airspace disease, increased compared to prior         XR CHEST PORTABLE   Final Result   Bilateral airspace opacities right greater than left could represent diffuse   pulmonary edema or infection         CT CHEST PULMONARY EMBOLISM W CONTRAST   Final Result   1. No evidence of pulmonary embolism. 2.  Right mainstem intubation, for which retraction 3 cm is recommended. 3.  Enteric tube terminates in the stomach. 4.  Interstitial edema with small right effusion and trace left effusion. Bilateral airspace disease may represent a component of alveolar edema as   well as aspiration pneumonitis.          CT HEAD WO CONTRAST   Final Result No acute intracranial abnormality.                      Electronically signed by Jamal Blizzard, MD, MD on 11/22/2022 at 9:55 AM

## 2022-11-22 NOTE — ACP (ADVANCE CARE PLANNING)
Advance Care Planning     Advance Care Planning Activator (Inpatient)  Conversation Note      Date of ACP Conversation: 11/22/2022     Conversation Conducted with:  Healthcare Decision Maker: Next of Kin by law (only applies in absence of above) (name) Shital Marte [phone]    ACP Activator: Gabriella Murphy Kelsie 45 Decision Maker:     Current Designated Health Care Decision Maker:     Primary Decision Maker: Zuly Nicole - Brother/Sister - [phone]    Care Preferences    Ventilation: \"If you were in your present state of health and suddenly became very ill and were unable to breathe on your own, what would your preference be about the use of a ventilator (breathing machine) if it were available to you? \"      Would the patient desire the use of ventilator (breathing machine)?: yes    \"If your health worsens and it becomes clear that your chance of recovery is unlikely, what would your preference be about the use of a ventilator (breathing machine) if it were available to you? \"     Would the patient desire the use of ventilator (breathing machine)?: Yes      Resuscitation  \"CPR works best to restart the heart when there is a sudden event, like a heart attack, in someone who is otherwise healthy. Unfortunately, CPR does not typically restart the heart for people who have serious health conditions or who are very sick. \"    \"In the event your heart stopped as a result of an underlying serious health condition, would you want attempts to be made to restart your heart (answer \"yes\" for attempt to resuscitate) or would you prefer a natural death (answer \"no\" for do not attempt to resuscitate)? \" yes       [] Yes   [] No   Educated Patient / Matthewnnette Roni regarding differences between Advance Directives and portable DNR orders.     Length of ACP Conversation in minutes:  30 min     Conversation Outcomes:  [x] ACP discussion completed    Electronically signed by Danna RAMOS, RN, Fifi Santos on 11/22/2022 at 1:11 PM  Palliative Care Nurse  Phone [phone]

## 2022-11-23 NOTE — PROGRESS NOTES
Pulmonary Progress Note    CC:  Follow up cardiac arrest, ARDS, influenza    Subjective:  FIO2 at 80% and PEEP at 14  Remains prone   Remains on pressors  NO acute events to report   Diuresed very well     Intake/Output Summary (Last 24 hours) at 11/23/2022 0837  Last data filed at 11/23/2022 0738  Gross per 24 hour   Intake 2290.54 ml   Output 7615 ml   Net -5324.46 ml         PHYSICAL EXAM:  Blood pressure 113/61, pulse 78, temperature 98.7 °F (37.1 °C), temperature source Bladder, resp. rate 21, height 5' 3\" (1.6 m), weight 213 lb 3 oz (96.7 kg), SpO2 100 %.'  Gen: Prone   Eyes: Prone, mild conjunctival injection   ENT: No discharge. Pharynx with ETT and gastric tube  Neck: Trachea midline. No obvious mass. Resp: Diminished with some wheezing   CV: Regular rate. Regular rhythm. No murmur or rub. GI: Prone   Skin: Edematous   Lymph: No cervical LAD. No supraclavicular LAD. M/S: No cyanosis. No clubbing. No joint deformity.     Neuro: Paralyzed  Ext:   no edema    Medications:    Scheduled Meds:   furosemide  40 mg IntraVENous BID    artificial tears   Both Eyes 6 times per day    metroNIDAZOLE  500 mg IntraVENous Q8H    cefepime  2,000 mg IntraVENous Q8H    insulin lispro  0-8 Units SubCUTAneous Q4H    oseltamivir 6mg/ml  75 mg Oral BID    enoxaparin  40 mg SubCUTAneous Daily    chlorhexidine  15 mL Mouth/Throat BID    lactobacillus  2 capsule Oral BID    pantoprazole  40 mg IntraVENous Daily    mupirocin   Topical BID    levETIRAcetam  1,000 mg IntraVENous Q12H    methylPREDNISolone  40 mg IntraVENous Q12H       Continuous Infusions:   norepinephrine 4 mcg/min (11/23/22 0738)    propofol 50 mcg/kg/min (11/23/22 0738)    dextrose      fentaNYL 50 mcg/hr (11/23/22 0738)    cisatracurium (NIMBEX) infusion 4.5 mcg/kg/min (11/23/22 0738)       PRN Meds:  acetaminophen **OR** acetaminophen, ondansetron **OR** ondansetron, perflutren lipid microspheres, glucose, dextrose bolus **OR** dextrose bolus, glucagon (rDNA), dextrose, fentaNYL **AND** fentaNYL    Labs:  CBC:   Recent Labs     22  0537 22  0431 22  0440   WBC 12.1* 13.0* 11.0   HGB 10.4* 9.8* 9.1*   HCT 32.9* 30.8* 27.9*   MCV 76.3* 74.9* 75.0*    146 128*     BMP:   Recent Labs     22  0537 22  0431 22  1200 22  0440    141  --  159*   K 4.1 3.8 3.9 3.5    103  --  119*   CO2 27 26  --  26   PHOS 2.8 1.9*  --  1.5*   BUN 8 14  --  18   CREATININE 0.6 0.5*  --  0.7     LIVER PROFILE:   No results for input(s): AST, ALT, LIPASE, BILIDIR, BILITOT, ALKPHOS in the last 72 hours. Invalid input(s): AMYLASE,  ALB    PT/INR:   No results for input(s): PROTIME, INR in the last 72 hours. APTT:   No results for input(s): APTT in the last 72 hours. UA:  No results for input(s): NITRITE, COLORU, PHUR, LABCAST, WBCUA, RBCUA, MUCUS, TRICHOMONAS, YEAST, BACTERIA, CLARITYU, SPECGRAV, LEUKOCYTESUR, UROBILINOGEN, BILIRUBINUR, BLOODU, GLUCOSEU, AMORPHOUS in the last 72 hours. Invalid input(s): Duana Spurling    No results for input(s): PH, PCO2, PO2 in the last 72 hours. Films:  Chest imaging reports were reviewed and imaging was reviewed by me and showed ETT and gastric tube in place. Diffuse airspace disease bilaterally     AB.6/34/200    Cultures:  Sputum;  Normal wilma   Blood:  NGTD    I reviewed the labs and images listed above    Assessment/Plan:   Cardiopulmonary Arrest PEA  Rate down to 16, FIO2 at 80, PEEP at 10. Plateau pressures are 29 today   Serial ABG  Place supine today. Hopefully can remain supine at which time we can stop the paralysis   Have to keep sedation for RASS goal of -5 while paralyzed   Discontinue lasix after am dose since she was net 5 liters negative in past 24 hours   Shock, likely cardiogenic and septic   Levophed for MAP of 65  Cefepime and Flagyl. Outside chart says she has anaphylaxis to PCN. [address] pointed this out.     Cultures NGTD  Trend procal ARDS  Vent protection strategy   Ok for pH of 7.2, PaO2 of 55 and saturation of 88%  DC Lasix after am dose   Influenza A  Tamiflu as ordered  Ok with solumedrol for now   Anoxic Encephalopathy   Unable to follow up with repeat imaging due to instability   Neuro exam limited due to ongoing paralysis   Myoclonus   Keppra  CT Brain when oxygen requirements are more stable   Metabolic Alkalosis  Cut back on RR to 16  Just one dose of lasix today and then DC  Hypernatremia  Free water 250 ml qid    DC lactic acid draws   KPO4 replaced    Has mental capacity of a 15year old per family     Medium scale insulin   Keep NPO until she can maintain supine status     GI prophylaxis  Protonix  DVT prophylaxis  Lovenox     Guarded prognosis. Critical care time of 31 minutes. This does not include procedural time. Please see procedural notes for details.     Yoav Ojeda,   [facility]

## 2022-11-23 NOTE — PROGRESS NOTES
[facility] called for a status update on family and code status verification. Provided all information to [facility].

## 2022-11-23 NOTE — CONSULTS
See previous note from today. Order modified per recommendations.       Electronically signed by Rex Min RD, LD on 11/23/2022 at 3:04 PM

## 2022-11-23 NOTE — PROGRESS NOTES
Comprehensive Nutrition Assessment    Type and Reason for Visit:  Reassess    Nutrition Recommendations/Plan: For TF, recommend Vital HP (peptide based high protein) at 10 ml/hr (calculated x 20 hr to account for routine nsg care) with Proteinex TID. Flushes per MD.      Malnutrition Assessment:  Malnutrition Status:  Insufficient data (11/19/22 3755)      Nutrition Assessment:    Follow-up. Since last assessment hypothermic protocol complete. Pt has required prone position with nimbex so TF unable to be started. Trial of supine position today, if tolerates nimbex will be d/c and TF to be tried. Pt continues on pressors. Propofol is running at 28.3 ml/hr providing 747 kcal at current rate from lipids. Palliative care following, pt remains full code. Will provide TF recs. Nutrition Related Findings:    Na high, flush to start of 250 ml QID per MD Wound Type: None       Current Nutrition Intake & Therapies:    Average Meal Intake: NPO  Average Supplements Intake: NPO  Diet NPO  Current Tube Feeding (TF) Orders:  Goal TF & Flush Orders Provides: Recommend Vital HP (peptide based high protein) at 10 ml/hr (calculated x 20 hr to account for routine nsg care)with Proteinex TID. This provides 200 ml TV, 200 kcal, 17 gm of protein, and 167 ml free fluid. Proteinex TID provides an additional 312 kcal, 78 gm of protein. Anthropometric Measures:  Height: 5' 3\" (160 cm)  Ideal Body Weight (IBW): 115 lbs (52 kg)       Current Body Weight: 213 lb (96.6 kg), 180 % IBW.     Current BMI (kg/m2): 37.7                          BMI Categories: Obese Class 2 (BMI 35.0 -39.9)    Estimated Daily Nutrient Needs:  Energy Requirements Based On: Kcal/kg  Weight Used for Energy Requirements: Current  Energy (kcal/day): 3178-4139 kcal (11-14 kcal/kg ABW)  Weight Used for Protein Requirements: Ideal  Protein (g/day): 104 gm (2 gm/kg IBW)  Method Used for Fluid Requirements: Other (Comment)  Fluid (ml/day): per MD    Nutrition Diagnosis:   Inadequate oral intake related to impaired respiratory function as evidenced by NPO or clear liquid status due to medical condition, intubation    Nutrition Interventions:   Food and/or Nutrient Delivery: Start Tube Feeding (if able to remain supine)  Nutrition Education/Counseling: No recommendation at this time  Coordination of Nutrition Care: Continue to monitor while inpatient       Goals:  Previous Goal Met: No Progress toward Goal(s)  Goals: Initiate nutrition support, by next RD assessment       Nutrition Monitoring and Evaluation:   Behavioral-Environmental Outcomes: None Identified  Food/Nutrient Intake Outcomes: Enteral Nutrition Intake/Tolerance  Physical Signs/Symptoms Outcomes: Biochemical Data, Hemodynamic Status, Nutrition Focused Physical Findings, Skin, Weight    Discharge Planning:     Too soon to determine     Devang Carranza, [address] [place] [address],   Contact: [phone]

## 2022-11-23 NOTE — CARE COORDINATION
Discharge Planning:   Per conversation with physician, patient may be appropriate for LTACH. Requested physician speak with family about LTACH. Spoke to Adonis Buck with [facility] to discuss possible LTACH referral. Confirmed LTACH cannot do paralytic medications.      JUANJO Cortez, DOM, Social Work/Case Management   [phone]  Electronically signed by JUANJO Cortez, DOM on 11/23/2022 at 4:20 PM

## 2022-11-23 NOTE — PROGRESS NOTES
Hospitalist Progress Note    Patient:  Winter Holguin  Unit/Bed:R7X-0577/2111-01   YOB: 1975       MRN: [id number] Acct: [de-identified]  PCP: FARHAN Garcia CNP    Date of Admission: 11/19/2022  --------------------------    Chief Complaint:      Cardiac arrest    Interval history    Winter Holguin is a 52 y.o. female hospitalized on 11/19/2022   after cardiac arrest.  Patient called EMS for flulike symptoms. She developed altered mental status while on the ambulance. In the ED she underwent CPR with ROSC achieved approximately 10 minutes. Initial rhythm PEA. Assessment/plan:      Cardiopulmonary arrest with ROSC approximately after 10 minutes of resuscitation. Initial rhythm was PEA. Influenza A  Severe acute hypoxic respiratory failure,/ARDS CTA of the chest showed no pulmonary embolism  Acute toxic metabolic encephalopathy  Myoclonic jerks/seizure ? Prior history of seizures  Pulmonary edema, echocardiogram reviewed. Normal EF. Aspiration pneumonia  Hyperglycemia ?   ,  A1c 6. Nonzero troponinemia, multifactorial  Obesity      PLAN    Neuro:  -Rewarming process completed 11/20/2022  -Neurology input appreciated, EEG after removal of paralytics  -Continue paralytic agents, sedation per ICU team for vent synchrony.  -Palliative care consulted and currently patient remain full code. Per notes the patient has mentation over 15years old. Pulm:  -Remain intubated  -Continue paralytics for vent synchrony per ICU team  -Continue broad broad-spectrum antibiotics and Tamiflu    CVS:  -Echocardiogram reviewed, normal systolic function,   -Continue vasopressor support as required. Renal:  Creatinine within reasonable range, continue monitoring, monitor urine output. Monitor and replete electrolyte. Patient is going to get IV Lasix today    GI:  -GI prophylaxis, tube feed per ICU team maintain Tamiflu broad-spectrum antibiotics for now. ID:  -On cefepime and Tamiflu. Procalcitonin improving    Endocrine:  -Off insulin drip and currently on sliding scale insulin    Hem/Onc:  -Monitor CBC       Skin: Continue skin care     Code Status: Full Code     DVT prophylaxis: Lovenox     Disposition: Continue ICU care    Nutrition. Discussed with the bedside nurse. Plan is to monitor her today and if patient is able to tolerate OG tube feedings. Otherwise we will start the patient on TPN     ----------------      Subjective:     Discussed with the nursing staff. Remained paralyzed on pressors and in prone position. Diet: Diet NPO    OBJECTIVE     Exam:  /61   Pulse 78   Temp 98.7 °F (37.1 °C) (Bladder)   Resp 21   Ht 5' 3\" (1.6 m)   Wt 213 lb 3 oz (96.7 kg)   SpO2 100%   BMI 37.76 kg/m²        Gen: Sedated  Head: Normocephalic. Atraumatic. ENT: ETT and tube feeding  in place, oral feeding tube in place  CVS: RRR c  Pulmomary: Remained in prone position. Gastrointestinal: Cannot be assessed the patient is in prone position  Musculoskeletal: No edema.  Warm  Neuro: Sedated, unable to assess  Psychiatry:  unable to assess       Medications:  Reviewed    Infusion Medications    norepinephrine 4 mcg/min (11/23/22 0738)    propofol 50 mcg/kg/min (11/23/22 0738)    dextrose      fentaNYL 50 mcg/hr (11/23/22 0738)    cisatracurium (NIMBEX) infusion 4.5 mcg/kg/min (11/23/22 0738)     Scheduled Medications    potassium phosphate IVPB  20 mmol IntraVENous Once    furosemide  40 mg IntraVENous BID    artificial tears   Both Eyes 6 times per day    metroNIDAZOLE  500 mg IntraVENous Q8H    cefepime  2,000 mg IntraVENous Q8H    insulin lispro  0-8 Units SubCUTAneous Q4H    oseltamivir 6mg/ml  75 mg Oral BID    enoxaparin  40 mg SubCUTAneous Daily    chlorhexidine  15 mL Mouth/Throat BID    lactobacillus  2 capsule Oral BID    pantoprazole  40 mg IntraVENous Daily    mupirocin   Topical BID    levETIRAcetam  1,000 mg IntraVENous Q12H    methylPREDNISolone  40 mg IntraVENous Q12H PRN Meds: acetaminophen **OR** acetaminophen, ondansetron **OR** ondansetron, perflutren lipid microspheres, glucose, dextrose bolus **OR** dextrose bolus, glucagon (rDNA), dextrose, fentaNYL **AND** fentaNYL      Intake/Output Summary (Last 24 hours) at 11/23/2022 0944  Last data filed at 11/23/2022 6129  Gross per 24 hour   Intake 2290.54 ml   Output 7370 ml   Net -5079.46 ml             Labs:   Recent Labs     11/21/22  0537 11/22/22  0431 11/23/22  0440   WBC 12.1* 13.0* 11.0   HGB 10.4* 9.8* 9.1*   HCT 32.9* 30.8* 27.9*    146 128*     Recent Labs     11/21/22  0537 11/22/22  0431 11/22/22  1200 11/23/22  0440    141  --  159*   K 4.1 3.8 3.9 3.5    103  --  119*   CO2 27 26  --  26   BUN 8 14  --  18   CREATININE 0.6 0.5*  --  0.7   CALCIUM 7.7* 7.4*  --  7.4*   PHOS 2.8 1.9*  --  1.5*     No results for input(s): AST, ALT, BILIDIR, BILITOT, ALKPHOS in the last 72 hours. No results for input(s): INR in the last 72 hours. No results for input(s): Hamp Memory in the last 72 hours. Urinalysis:      Lab Results   Component Value Date/Time    NITRU Negative 11/19/2022 06:00 AM    WBCUA 3-5 11/19/2022 06:00 AM    BACTERIA 1+ 11/19/2022 06:00 AM    RBCUA 0-2 11/19/2022 06:00 AM    BLOODU TRACE 11/19/2022 06:00 AM    SPECGRAV 1.045 11/19/2022 06:00 AM    GLUCOSEU Negative 11/19/2022 06:00 AM       Radiology:  XR CHEST PORTABLE   Final Result   Bilateral airspace disease, increased compared to prior         XR CHEST PORTABLE   Final Result   Bilateral airspace opacities right greater than left could represent diffuse   pulmonary edema or infection         CT CHEST PULMONARY EMBOLISM W CONTRAST   Final Result   1. No evidence of pulmonary embolism. 2.  Right mainstem intubation, for which retraction 3 cm is recommended. 3.  Enteric tube terminates in the stomach. 4.  Interstitial edema with small right effusion and trace left effusion.    Bilateral airspace disease may represent a component of alveolar edema as   well as aspiration pneumonitis. CT HEAD WO CONTRAST   Final Result   No acute intracranial abnormality.                      Electronically signed by Tiana Norman MD, MD on 11/23/2022 at 9:44 AM

## 2022-11-24 NOTE — PROGRESS NOTES
Morning labs with Na 164  Free water deficit = 9.6 L  Will increase water flushes to 300 q 4 hours  Will ask nephrology for evaluation and recommendations    Hopsitalist on call

## 2022-11-24 NOTE — PROGRESS NOTES
Hospitalist Progress Note    Patient:  Winter Holguin  Unit/Bed:[id number]/2111-01   YOB: 1975       MRN: [id number] Acct: [de-identified]  PCP: FARHAN Garcia CNP    Date of Admission: 11/19/2022  --------------------------    Chief Complaint:      Cardiac arrest    Interval history    Winter Holguin is a 52 y.o. female hospitalized on 11/19/2022   after cardiac arrest.  Patient called EMS for flulike symptoms. She developed altered mental status while on the ambulance. In the ED she underwent CPR with ROSC achieved approximately 10 minutes. Initial rhythm PEA. Assessment/plan:      Cardiopulmonary arrest with ROSC approximately after 10 minutes of resuscitation. Initial rhythm was PEA. Influenza A  Severe acute hypoxic respiratory failure,/ARDS CTA of the chest showed no pulmonary embolism  Acute toxic metabolic encephalopathy  Myoclonic jerks/seizure ? Prior history of seizures  Pulmonary edema, echocardiogram reviewed. Normal EF. Aspiration pneumonia  Hyperglycemia ?   ,  A1c 6. Nonzero troponinemia, multifactorial  Obesity      PLAN:  Neuro:  -Rewarming process completed 11/20/2022  -Neurology input appreciated, EEG after removal of paralytics  -Continue paralytic agents, sedation per ICU team for vent synchrony.  -Palliative care consulted and currently patient remain full code. Per notes the patient has mentation over 15years old. Pulm:  -Remain intubated  -Continue paralytics for vent synchrony per ICU team  -Continue broad broad-spectrum antibiotics and completed Tamiflu    CVS:  -Echocardiogram reviewed, normal systolic function,   -Continue vasopressor support as required. Renal:  Creatinine within reasonable range, continue monitoring, monitor urine output. Monitor and replete electrolyte. Hypernatremia: Continue IV fluids. We will change to dextrose. Nephrology consulted. GI:  -GI prophylaxis. ID:  -On cefepime and completed Tamiflu. Procalcitonin improving    Endocrine:  -Off insulin drip and currently on sliding scale insulin    Hem/Onc:  -Monitor CBC       Skin: Continue skin care     Code Status: Full Code     DVT prophylaxis: Lovenox     Disposition: Continue ICU care    Nutrition. Continue G-tube feeding.     ----------------    Subjective:     Remained paralyzed and on not improving. Levophed has been stopped. Diet: Diet NPO  ADULT TUBE FEEDING; Orogastric; Peptide Based High Protein; Continuous; 10; No; 300; Q 4 hours; Protein; Proteinex TID, do not mix with TF directly    OBJECTIVE     Exam:  /70   Pulse 97   Temp 98.7 °F (37.1 °C) (Rectal)   Resp 24   Ht 5' 3\" (1.6 m)   Wt 205 lb 0.4 oz (93 kg)   SpO2 91%   BMI 36.32 kg/m²        Gen: Sedated  Head: Normocephalic. Atraumatic. ENT: ETT and tube feeding  in place, oral feeding tube in place  CVS: RRR   Pulmomary: Remained in prone position. Gastrointestinal: Cannot be assessed the patient is in prone position  Musculoskeletal: No edema.  Warm  Neuro: Sedated, unable to assess  Psychiatry:  unable to assess       Medications:  Reviewed    Infusion Medications    dextrose 75 mL/hr at 11/24/22 1042    fentaNYL 50 mcg/hr (11/24/22 0612)    propofol 25 mcg/kg/min (11/24/22 0817)    norepinephrine Stopped (11/24/22 3426)    dextrose       Scheduled Medications    docusate  100 mg Oral Daily    artificial tears   Both Eyes 6 times per day    metroNIDAZOLE  500 mg IntraVENous Q8H    cefepime  2,000 mg IntraVENous Q8H    insulin lispro  0-8 Units SubCUTAneous Q4H    oseltamivir 6mg/ml  75 mg Oral BID    enoxaparin  40 mg SubCUTAneous Daily    chlorhexidine  15 mL Mouth/Throat BID    lactobacillus  2 capsule Oral BID    pantoprazole  40 mg IntraVENous Daily    levETIRAcetam  1,000 mg IntraVENous Q12H    methylPREDNISolone  40 mg IntraVENous Q12H     PRN Meds: fentaNYL **AND** fentaNYL, acetaminophen **OR** acetaminophen, ondansetron **OR** ondansetron, perflutren lipid microspheres, glucose, dextrose bolus **OR** dextrose bolus, glucagon (rDNA), dextrose      Intake/Output Summary (Last 24 hours) at 11/24/2022 1044  Last data filed at 11/24/2022 0816  Gross per 24 hour   Intake 3026.58 ml   Output 2290 ml   Net 736.58 ml             Labs:   Recent Labs     11/22/22  0431 11/23/22  0440 11/24/22  0424   WBC 13.0* 11.0 18.6*   HGB 9.8* 9.1* 10.4*   HCT 30.8* 27.9* 33.0*    128* 123*     Recent Labs     11/22/22  0431 11/22/22  1200 11/23/22  0440 11/24/22 0424     --  159* 164*   K 3.8 3.9 3.5 4.4     --  119* 121*   CO2 26  --  26 33*   BUN 14  --  18 30*   CREATININE 0.5*  --  0.7 0.8   CALCIUM 7.4*  --  7.4* 7.7*   PHOS 1.9*  --  1.5* 6.2*     Recent Labs     11/24/22 0424   AST 34   ALT 23   BILITOT <0.2   ALKPHOS 79       No results for input(s): INR in the last 72 hours. No results for input(s): [place] Ill in the last 72 hours. Urinalysis:      Lab Results   Component Value Date/Time    NITRU Negative 11/19/2022 06:00 AM    WBCUA 3-5 11/19/2022 06:00 AM    BACTERIA 1+ 11/19/2022 06:00 AM    RBCUA 0-2 11/19/2022 06:00 AM    BLOODU TRACE 11/19/2022 06:00 AM    SPECGRAV 1.045 11/19/2022 06:00 AM    GLUCOSEU Negative 11/19/2022 06:00 AM       Radiology:  XR CHEST PORTABLE   Final Result   Bilateral airspace disease, increased compared to prior         XR CHEST PORTABLE   Final Result   Bilateral airspace opacities right greater than left could represent diffuse   pulmonary edema or infection         CT CHEST PULMONARY EMBOLISM W CONTRAST   Final Result   1. No evidence of pulmonary embolism. 2.  Right mainstem intubation, for which retraction 3 cm is recommended. 3.  Enteric tube terminates in the stomach. 4.  Interstitial edema with small right effusion and trace left effusion. Bilateral airspace disease may represent a component of alveolar edema as   well as aspiration pneumonitis.          CT HEAD WO CONTRAST   Final Result No acute intracranial abnormality.                      Electronically signed by Rommel Brady MD, MD on 11/24/2022 at 10:44 AM

## 2022-11-24 NOTE — CONSULTS
Nephrology ([facility]) Consult Note    Maryellen Gorman is a 52 y.o. female whom we were asked to see for hypernatremia. The patient presented on 11/19. She called EMS for flulike symptoms, apparently walked to the ambulance, but lost consciousness with pink frothy emesis. She was PEA arrest in the ER. She was started on hypothermia protocol. She was diagnosed with influenza and ARDS. She has been proned and on vasopressors. 11/22/22 Na 141  started lasix 40 mg IV bid  11/23/22 Na 159  d/c'ed lasix as 5 liters diuresed  11/24/22 Na 164    Patient is intubated, so cannot provide history, which was obtained from chart. Past Medical History:  seizure disorder  obesity  HTN  MRDD    Review of System:  Otherwise unremarkable    Allergies:  Penicillins    Medications:  Current medications reviewed. Social History:  no tobacco  Family Medical History:  Negative for kidney disase    Physical Exam:  Blood pressure 139/70, pulse 97, temperature 98.7 °F (37.1 °C), temperature source Rectal, resp. rate 24, height 5' 3\" (1.6 m), weight 205 lb 0.4 oz (93 kg), SpO2 91 %.     General:  NAD, ventilated, ill-appearing, normal body habitus  HEENT:  PERRL, EOMI  Neck:  Supple, normal range of movement, thyroid unremarkable  Chest:  coarse BS, moderate air movement  CV:  RRR, no murmurs or rubs, no carotid bruit, no abdominal bruits  Abdomen:  NTND, soft, +BS, no hepatosplenomegaly  Extremities:  No peripheral edema  Neurological:  N/A  Lymphatics:  No palpable lymph nodes  Skin:  No rash, no jaundice  Psychiatric:  N/A    Laboratory Studies:  Lab Results   Component Value Date/Time     (HH) 11/24/2022 04:24 AM    K 4.4 11/24/2022 04:24 AM    K 3.5 11/19/2022 05:01 AM     (H) 11/24/2022 04:24 AM    CO2 33 (H) 11/24/2022 04:24 AM    BUN 30 (H) 11/24/2022 04:24 AM    CREATININE 0.8 11/24/2022 04:24 AM    CALCIUM 7.7 (L) 11/24/2022 04:24 AM    PHOS 6.2 (H) 11/24/2022 04:24 AM    MG 3.00 (H) 11/24/2022 04:24 AM     Lab Results   Component Value Date/Time    WBC 18.6 (H) 11/24/2022 04:24 AM    HGB 10.4 (L) 11/24/2022 04:24 AM    HCT 33.0 (L) 11/24/2022 04:24 AM     (L) 11/24/2022 04:24 AM       Assessment/Plan:  Reviewed old records and labs. 1) hypernatremia   - free water deficit 6.8 liters + 1/2 UO   - will start D5W 100 ml/h, while water will not theoretically worsen ARDS as it is not volume, it is \"quantity of fluid\", and given his FIO2 80%, I did want to error on the side of being conservative    - continue free water flushes, but it will not correct free water deficit    2) ARDS   - per pulmonary   - if fluid removal is desired, ultrafiltration may be preferable as we would be able to remove isotonic fluid from the patient, unlike with diuretic that she had diuresis > natriuresis.       3) ID   - tamiflu finishes today   - on empiric cefepime and flagyl   - blood cultures NGTD    4) septic shock   - off levophed gtt   - on solumedrol    5) seizure disorder   - on keppra    6) FEN   - acid/base    - permissive hypercapnia   - hyperphosphatemia    - monitor    critical care:  30+ min

## 2022-11-24 NOTE — PROGRESS NOTES
Pts RR increased to 22 d/t increased CO2 on ABG results; repeat ABG at 0530; will continue to monitor.

## 2022-11-24 NOTE — PROGRESS NOTES
4 Eyes Skin Assessment     NAME:  Alan Álvarez  YOB: 1975  MEDICAL RECORD NUMBER:  [id number]    The patient is being assessed for  Shift Handoff    I agree that One RN have performed a thorough Head to Toe Skin Assessment on the patient. ALL assessment sites listed below have been assessed. Areas assessed by both nurses:    Head, Face, Ears, Shoulders, Back, Chest, Arms, Elbows, Hands, Sacrum. Buttock, Coccyx, Ischium, and Legs. Feet and Heels        Does the Patient have a Wound?  No noted wound(s)       [place] Prevention initiated by RN: Yes   Wound Care Orders initiated by RN: No    Pressure Injury (Stage 3,4, Unstageable, DTI, NWPT, and Complex wounds) if present place referral order by RN under : No    New and Established Ostomies, if present place, referral order under : No      Nurse 1 eSignature: Electronically signed by Jason Montgomery RN on 11/24/22 at 6:33 AM EST    **SHARE this note so that the co-signing nurse is able to place an eSignature**    Nurse 2 eSignature: {Esignature:[id number]}

## 2022-11-24 NOTE — PROGRESS NOTES
Pulmonary Progress Note    CC:  Follow up cardiac arrest, ARDS, influenza    Subjective:  FIO2 at 80% and PEEP at 14  Supine  On levophed still  Sodium has increased   Off paralytic     Intake/Output Summary (Last 24 hours) at 11/24/2022 0715  Last data filed at 11/24/2022 0641  Gross per 24 hour   Intake 3562.86 ml   Output 4215 ml   Net -652.14 ml         PHYSICAL EXAM:  Blood pressure 139/70, pulse (!) 103, temperature 98.1 °F (36.7 °C), temperature source Rectal, resp. rate 22, height 5' 3\" (1.6 m), weight 205 lb 0.4 oz (93 kg), SpO2 96 %.'  Gen: Comatose   Eyes: Eyes fixed and dilated   ENT: No discharge. Pharynx with ETT and gastric tube  Neck: Trachea with ETT and NG    Resp: Rhonchi   CV: Tachycardic. +murmur   GI: Soft, diminished BS  Skin: Edematous   Lymph: No cervical LAD. No supraclavicular LAD. M/S: No cyanosis. No clubbing. No joint deformity.     Neuro: No response to stimuli   Ext:   mild edema    Medications:    Scheduled Meds:   docusate  100 mg Oral Daily    artificial tears   Both Eyes 6 times per day    metroNIDAZOLE  500 mg IntraVENous Q8H    cefepime  2,000 mg IntraVENous Q8H    insulin lispro  0-8 Units SubCUTAneous Q4H    oseltamivir 6mg/ml  75 mg Oral BID    enoxaparin  40 mg SubCUTAneous Daily    chlorhexidine  15 mL Mouth/Throat BID    lactobacillus  2 capsule Oral BID    pantoprazole  40 mg IntraVENous Daily    levETIRAcetam  1,000 mg IntraVENous Q12H    methylPREDNISolone  40 mg IntraVENous Q12H       Continuous Infusions:   fentaNYL 50 mcg/hr (11/24/22 0612)    propofol 30 mcg/kg/min (11/24/22 0612)    norepinephrine Stopped (11/24/22 0635)    dextrose         PRN Meds:  fentaNYL **AND** fentaNYL, acetaminophen **OR** acetaminophen, ondansetron **OR** ondansetron, perflutren lipid microspheres, glucose, dextrose bolus **OR** dextrose bolus, glucagon (rDNA), dextrose    Labs:  CBC:   Recent Labs     11/22/22  0431 11/23/22  0440 11/24/22  0424   WBC 13.0* 11.0 18.6*   HGB 9.8* 9.1* 10.4*   HCT 30.8* 27.9* 33.0*   MCV 74.9* 75.0* 77.2*    128* 123*     BMP:   Recent Labs     22  0431 22  1200 22  0440 22  0424     --  159* 164*   K 3.8 3.9 3.5 4.4     --  119* 121*   CO2 26  --  26 33*   PHOS 1.9*  --  1.5* 6.2*   BUN 14  --  18 30*   CREATININE 0.5*  --  0.7 0.8     LIVER PROFILE:   Recent Labs     22   AST 34   ALT 23   BILITOT <0.2   ALKPHOS 79       PT/INR:   No results for input(s): PROTIME, INR in the last 72 hours. APTT:   No results for input(s): APTT in the last 72 hours. UA:  No results for input(s): NITRITE, COLORU, PHUR, LABCAST, WBCUA, RBCUA, MUCUS, TRICHOMONAS, YEAST, BACTERIA, CLARITYU, SPECGRAV, LEUKOCYTESUR, UROBILINOGEN, BILIRUBINUR, BLOODU, GLUCOSEU, AMORPHOUS in the last 72 hours. Invalid input(s): Reanna Reed    No results for input(s): PH, PCO2, PO2 in the last 72 hours. Films:  Chest imaging reports were reviewed and imaging was reviewed by me and showed ETT and gastric tube in place. Diffuse airspace disease bilaterally     AB.6/34/200    Cultures:  Sputum;  Normal wilma   Blood:  NGTD    I reviewed the labs and images listed above    Assessment/Plan:   Cardiopulmonary Arrest PEA  Full vent. RR increased to 24 due to mild acidosis. PEEP to remain at 14 as plateau pressure at 30  Serial ABG  RASS goal to -2. Once oxygen status is more stable will stop all sedation   Shock, likely cardiogenic and septic   Levophed for MAP of 65  Cefepime and Flagyl. Outside chart says she has anaphylaxis to PCN. [address] pointed this out. Cultures NGTD  Trend procal   ARDS  Vent protection strategy   Ok for pH of 7.2, PaO2 of 55 and saturation of 88%  Influenza A  Tamiflu completed   Solumedrol q 12 hours   Anoxic Encephalopathy   No response on exam and pupils fixed/dilated. I would prefer more stability before sending off floor for CT.     Myoclonus   Keppra  CT Brain when oxygen requirements are more stable   Hypernatremia, worsening   Free water increased  Nephrology has been consulted  Has large free water deficit. Defer regarding using gastic tube vs hypotonic infusion. Would prefer to keep volume status dry due to ARDS      Has mental capacity of a 15year old per family     Medium scale insulin   Ok to start enteral feeding  Add bowel regimen     GI prophylaxis  Protonix  DVT prophylaxis  Lovenox     Guarded prognosis. Critical care time of 31 minutes. This does not include procedural time. Please see procedural notes for details.     Damari Bey, DO [facility]

## 2022-11-24 NOTE — PLAN OF CARE
Problem: Safety - Medical Restraint  Goal: Remains free of injury from restraints (Restraint for Interference with Medical Device)  Description: INTERVENTIONS:  1. Determine that other, less restrictive measures have been tried or would not be effective before applying the restraint  2. Evaluate the patient's condition at the time of restraint application  3. Inform patient/family regarding the reason for restraint  4.  Q2H: Monitor safety, psychosocial status, comfort, nutrition and hydration  Outcome: Progressing  Flowsheets (Taken 11/23/2022 2000)  Remains free of injury from restraints (restraint for interference with medical device):   Determine that other, less restrictive measures have been tried or would not be effective before applying the restraint   Evaluate the patient's condition at the time of restraint application   Inform patient/family regarding the reason for restraint   Every 2 hours: Monitor safety, psychosocial status, comfort, nutrition and hydration     Problem: Nutrition Deficit:  Goal: Optimize nutritional status  Outcome: Progressing  Flowsheets (Taken 11/23/2022 1239 by Luba Nash, CLEMENTE, LD)  Nutrient intake appropriate for improving, restoring, or maintaining nutritional needs:   Assess nutritional status and recommend course of action   Monitor oral intake, labs, and treatment plans   Recommend, monitor, and adjust tube feedings and TPN/PPN based on assessed needs     Problem: Pain  Goal: Verbalizes/displays adequate comfort level or baseline comfort level  Outcome: Progressing  Flowsheets  Taken 11/23/2022 1930 by Kaylah Moss RN  Verbalizes/displays adequate comfort level or baseline comfort level: Assess pain using appropriate pain scale  Taken 11/23/2022 1600 by Everardo Ortiz RN  Verbalizes/displays adequate comfort level or baseline comfort level:   Assess pain using appropriate pain scale   Administer analgesics based on type and severity of pain and evaluate response Problem: Skin/Tissue Integrity  Goal: Absence of new skin breakdown  Description: 1. Monitor for areas of redness and/or skin breakdown  2.   Assess vascular access sites hourly    Outcome: Progressing     Problem: ABCDS Injury Assessment  Goal: Absence of physical injury  Outcome: Progressing  Flowsheets (Taken 11/22/2022 1329 by Donnell Moore RN)  Absence of Physical Injury: Implement safety measures based on patient assessment     Problem: Safety - Adult  Goal: Free from fall injury  Outcome: Progressing  Flowsheets (Taken 11/24/2022 0304)  Free From Fall Injury: Instruct family/caregiver on patient safety     Problem: Discharge Planning  Goal: Discharge to home or other facility with appropriate resources  Outcome: Not Progressing  Flowsheets (Taken 11/23/2022 1930)  Discharge to home or other facility with appropriate resources:   Identify barriers to discharge with patient and caregiver   Arrange for needed discharge resources and transportation as appropriate   Identify discharge learning needs (meds, wound care, etc)

## 2022-11-25 NOTE — PROGRESS NOTES
Pulmonary Progress Note    CC:  Follow up cardiac arrest, ARDS, influenza    Subjective:  FIO2 at 70% and PEEP at 14  On levophed still      Intake/Output Summary (Last 24 hours) at 11/25/2022 0609  Last data filed at 11/25/2022 0555  Gross per 24 hour   Intake 4318.7 ml   Output 3785 ml   Net 533.7 ml           PHYSICAL EXAM:  Blood pressure 121/65, pulse 82, temperature 98.7 °F (37.1 °C), temperature source Rectal, resp. rate 24, height 5' 3\" (1.6 m), weight 206 lb 5.6 oz (93.6 kg), SpO2 91 %.'  Gen: Comatose   Eyes: Eyes fixed and dilated   ENT: No discharge. Pharynx with ETT and gastric tube  Neck: Trachea with ETT and NG    Resp: Rhonchi with no cough  CV: Tachycardic. +murmur   GI: Soft, diminished BS  Skin: Edematous   Lymph: No cervical LAD. No supraclavicular LAD. M/S: No cyanosis. No clubbing. No joint deformity.     Neuro: No response to stimuli, pupils fixed and dilated, no cough   Ext:   mild edema    Medications:    Scheduled Meds:   potassium phosphate IVPB  30 mmol IntraVENous Once    potassium chloride  20 mEq IntraVENous Q1H    docusate  100 mg Oral Daily    artificial tears   Both Eyes 6 times per day    metroNIDAZOLE  500 mg IntraVENous Q8H    cefepime  2,000 mg IntraVENous Q8H    insulin lispro  0-8 Units SubCUTAneous Q4H    enoxaparin  40 mg SubCUTAneous Daily    chlorhexidine  15 mL Mouth/Throat BID    lactobacillus  2 capsule Oral BID    pantoprazole  40 mg IntraVENous Daily    levETIRAcetam  1,000 mg IntraVENous Q12H    methylPREDNISolone  40 mg IntraVENous Q12H       Continuous Infusions:   dextrose 100 mL/hr at 11/25/22 0553    fentaNYL 50 mcg/hr (11/25/22 0553)    propofol Stopped (11/24/22 1340)    norepinephrine 10 mcg/min (11/25/22 0553)    dextrose         PRN Meds:  fentaNYL **AND** fentaNYL, acetaminophen **OR** acetaminophen, ondansetron **OR** ondansetron, perflutren lipid microspheres, glucose, dextrose bolus **OR** dextrose bolus, glucagon (rDNA), dextrose    Labs:  CBC: Recent Labs     22  0440 224 22  0341   WBC 11.0 18.6* 10.3   HGB 9.1* 10.4* 8.5*   HCT 27.9* 33.0* 27.6*   MCV 75.0* 77.2* 77.2*   * 123*  --        BMP:   Recent Labs     22  0440 22  0424 22  1725 22  0341   * 164* 160*  --    K 3.5 4.4  --  3.4*   * 121*  --  122*   CO2 26 33*  --  29   PHOS 1.5* 6.2*  --  0.8*   BUN 18 30*  --  23*   CREATININE 0.7 0.8  --  0.7       LIVER PROFILE:   Recent Labs     22   AST 34   ALT 23   BILITOT <0.2   ALKPHOS 79         PT/INR:   No results for input(s): PROTIME, INR in the last 72 hours. APTT:   No results for input(s): APTT in the last 72 hours. UA:  No results for input(s): NITRITE, COLORU, PHUR, LABCAST, WBCUA, RBCUA, MUCUS, TRICHOMONAS, YEAST, BACTERIA, CLARITYU, SPECGRAV, LEUKOCYTESUR, UROBILINOGEN, BILIRUBINUR, BLOODU, GLUCOSEU, AMORPHOUS in the last 72 hours. Invalid input(s): [place]    No results for input(s): PH, PCO2, PO2 in the last 72 hours. Films:  Chest imaging reports were reviewed and imaging was reviewed by me and showed ETT and gastric tube in place. Diffuse airspace disease bilaterally     AB.    Cultures:  Sputum;  Normal wilma   Blood:  NGTD    I reviewed the labs and images listed above    Assessment/Plan:   Cardiopulmonary Arrest PEA  Full vent. Rate down to 22. Daily ABG  Discontinue all sedation   Shock, likely cardiogenic and septic   Levophed for MAP of 65  Cefepime and Flagyl for 7 days. Outside chart says she has anaphylaxis to PCN. [address] pointed this out.     Cultures NGTD  Trend procal   ARDS  Vent protection strategy   Ok for pH of 7.2, PaO2 of 55 and saturation of 88%  Influenza A  Tamiflu completed   Solumedrol to q 24 hours for 2 days   Anoxic Encephalopathy/Comatose    Discontinue all sedation  CT Brain in 24 hours if oxygen requirements are stable  Myoclonus   Keppra  CT Brain when oxygen requirements are more stable   Hypernatremia, worsening   On hypotonic saline per Nephrology       Has mental capacity of a 15year old per family     Increase insulin   Enteral feeding  Bowel regimen     GI prophylaxis  Protonix  DVT prophylaxis  Lovenox     Poor prognosis due to lack of neurological improvement      Critical care time of 31 minutes. This does not include procedural time. Please see procedural notes for details.     Destinee Stanford, DO [facility]

## 2022-11-25 NOTE — PROGRESS NOTES
Comprehensive Nutrition Assessment    Type and Reason for Visit:  Reassess    Nutrition Recommendations/Plan:   Modify TF d/t discontinuation of sedation. Recommend Vital 1.2 with goal rate of 50 ml/hr +1 proteinex (x20 hrs, nrs care). Flush per provider. Monitor for changes in D5 to adjust TF as necessary, currently 20 mL/hr. Malnutrition Assessment:  Malnutrition Status:  Insufficient data (11/19/22 2095)      Nutrition Assessment:    Follow-up. Pt with ARDS, s/p proning, now supine. Pt currenlty on Levo. All sedation D/C'd today. D5 was started and now running @ 20 mL/hr providing 82 kcal daily. OG in place and TF started. Vital @ 10 mL/hr + 3 proteinex. Will adjust current TF as pt no longer receiving sedation. Nutrition Related Findings:    non pitting generalized edema; No BM noted; Na 162, K+ 3.4, Phos 0.8, elevated Glu Wound Type: None       Current Nutrition Intake & Therapies:    Average Meal Intake: NPO  Average Supplements Intake: NPO  Diet NPO  ADULT TUBE FEEDING; Orogastric; Peptide Based High Protein; Continuous; 10; No; 300; Q 4 hours; Protein; Proteinex TID, do not mix with TF directly  Current Tube Feeding (TF) Orders:  Feeding Route: Orogastric  Formula: Peptide Based High Protein  Schedule: Continuous  Feeding Regimen: @ 10 ml/hr  Additives/Modulars: Protein (TID)  Water Flushes: per provider  Current TF & Flush Orders Provides: 200 ml TV, 200 kcal, 17 gm of protein, and 167 ml free fluid. Proteinex TID provides an additional 312 kcal, 78 gm of protein  Goal TF & Flush Orders Provides: Recommend Vital 1.2 with goal rate of 50 ml/hr +1 proteinex (x20 hrs, nrsg care). Flush per provider. TF regimen provides: 1000 mL TV, 1304 kcal, 101 gm pro, 811 mL free water (includes 1 proteinex daily).   Additional Calorie Sources:    D5 @ 20 mL/hr providing 82 kcal daily    Anthropometric Measures:  Height: 5' 3\" (160 cm)  Ideal Body Weight (IBW): 115 lbs (52 kg)       Current Body Weight: 206 lb (93.4 kg), 179.1 % IBW. Weight Source: Bed Scale  Current BMI (kg/m2): 36.5                          BMI Categories: Obese Class 2 (BMI 35.0 -39.9)    Estimated Daily Nutrient Needs:  Energy Requirements Based On: Kcal/kg  Weight Used for Energy Requirements: Current  Energy (kcal/day): 2462-6658 kcal (11-14 kcal/kg ABW)  Weight Used for Protein Requirements: Ideal  Protein (g/day): 104 gm (2 gm/kg IBW)  Method Used for Fluid Requirements: Other (Comment)  Fluid (ml/day): per MD    Nutrition Diagnosis:   Inadequate oral intake related to impaired respiratory function as evidenced by NPO or clear liquid status due to medical condition, intubation    Nutrition Interventions:   Food and/or Nutrient Delivery: Modify Tube Feeding  Nutrition Education/Counseling: No recommendation at this time  Coordination of Nutrition Care: Continue to monitor while inpatient       Goals:  Previous Goal Met: Progressing toward Goal(s)  Goals: Tolerate nutrition support at goal rate       Nutrition Monitoring and Evaluation:   Behavioral-Environmental Outcomes: None Identified  Food/Nutrient Intake Outcomes: Enteral Nutrition Intake/Tolerance  Physical Signs/Symptoms Outcomes: Biochemical Data, Hemodynamic Status, Nutrition Focused Physical Findings, Skin, Weight    Discharge Planning:     Too soon to determine     Kiarra Wetzel RD, LD  Contact: [phone]

## 2022-11-25 NOTE — PROGRESS NOTES
Hospitalist Progress Note    Patient:  Miguel A Marsh  Unit/Bed:A8I-9033/2111-01   YOB: 1975       MRN: [id number] Acct: [de-identified]  PCP: FARHAN Pagan CNP    Date of Admission: 11/19/2022  --------------------------    Chief Complaint:      Cardiac arrest    Interval history    Miguel A Marsh is a 52 y.o. female hospitalized on 11/19/2022   after cardiac arrest.  Patient called EMS for flulike symptoms. She developed altered mental status while on the ambulance. In the ED she underwent CPR with ROSC achieved approximately 10 minutes. Initial rhythm PEA. Assessment/plan:      Cardiopulmonary arrest with ROSC approximately after 10 minutes of resuscitation. Initial rhythm was PEA. Influenza A  Severe acute hypoxic respiratory failure,/ARDS CTA of the chest showed no pulmonary embolism  Shock, cardiogenic and septic  Anoxic encephalopathy  Myoclonic jerks/seizure ? Prior history of seizures  Pulmonary edema, echocardiogram reviewed. Normal EF. Hypernatremia  Aspiration pneumonia  Hyperglycemia ?   ,  A1c 6. Nonzero troponinemia, multifactorial  Obesity      PLAN:  Neuro:  -Rewarming process completed 11/20/2022  -Neurology input appreciated, EEG after removal of paralytics  -Stop all sedation  -Palliative care consulted and currently patient remain full code. Per notes the patient has mentation over 15years old. Pulm:  -Remain intubated  -Stop all sedation  -Continue broad broad-spectrum antibiotics and completed Tamiflu    CVS:  -Echocardiogram reviewed, normal systolic function,   -Continue vasopressor support as required. Renal:  Creatinine within reasonable range, continue monitoring, monitor urine output. Monitor and replete electrolyte. Hypernatremia: Continue IV fluids. We will change to dextrose. Nephrology consulted. GI:  -GI prophylaxis. ID:  -On cefepime and completed Tamiflu.   Procalcitonin improving    Endocrine:  -Off insulin drip and currently on high dose sliding scale insulin    Hem/Onc:  -Monitor CBC       Skin: Continue skin care     Code Status: Full Code     DVT prophylaxis: Lovenox     Disposition: Continue ICU care, poor prognosis given lack of any neurological recovery. Ongoing goals of care discussion with family. Nutrition. Continue G-tube feeding.     ----------------    Subjective:     No clinical change. Unresponsive. Diet: Diet NPO  ADULT TUBE FEEDING; Orogastric; Peptide Based; Continuous; 25; Yes; 25; Q 4 hours; 50; 300; Q 4 hours; Protein; Bolus 1 proteinex daily. OBJECTIVE     Exam:  /65   Pulse 78   Temp 98.7 °F (37.1 °C) (Rectal)   Resp 22   Ht 5' 3\" (1.6 m)   Wt 206 lb 5.6 oz (93.6 kg)   SpO2 95%   BMI 36.55 kg/m²        Gen: Sedated  Head: Normocephalic. Atraumatic. ENT: ETT and tube feeding  in place, oral feeding tube in place  CVS: RRR   Pulmomary: Remained in prone position. Gastrointestinal: Cannot be assessed the patient is in prone position  Musculoskeletal: No edema.  Warm  Neuro: Sedated, unable to assess  Psychiatry:  unable to assess       Medications:  Reviewed    Infusion Medications    dextrose 100 mL/hr at 11/25/22 0814    norepinephrine 8 mcg/min (11/25/22 0742)    dextrose       Scheduled Medications    potassium phosphate IVPB  30 mmol IntraVENous Once    insulin lispro  0-16 Units SubCUTAneous Q4H    methylPREDNISolone  40 mg IntraVENous Daily    docusate  100 mg Oral Daily    artificial tears   Both Eyes 6 times per day    metroNIDAZOLE  500 mg IntraVENous Q8H    cefepime  2,000 mg IntraVENous Q8H    enoxaparin  40 mg SubCUTAneous Daily    chlorhexidine  15 mL Mouth/Throat BID    lactobacillus  2 capsule Oral BID    pantoprazole  40 mg IntraVENous Daily    levETIRAcetam  1,000 mg IntraVENous Q12H     PRN Meds: acetaminophen **OR** acetaminophen, ondansetron **OR** ondansetron, perflutren lipid microspheres, glucose, dextrose bolus **OR** dextrose bolus, glucagon (rDNA), dextrose      Intake/Output Summary (Last 24 hours) at 11/25/2022 0821  Last data filed at 11/25/2022 0742  Gross per 24 hour   Intake 4686.66 ml   Output 3410 ml   Net 1276.66 ml               Labs:   Recent Labs     11/23/22  0440 11/24/22  0424 11/25/22  0341   WBC 11.0 18.6* 10.3   HGB 9.1* 10.4* 8.5*   HCT 27.9* 33.0* 27.6*   * 123* 85*       Recent Labs     11/23/22  0440 11/24/22  0424 11/24/22  1725 11/25/22  0341   * 164* 160* 162*   K 3.5 4.4  --  3.4*   * 121*  --  122*   CO2 26 33*  --  29   BUN 18 30*  --  23*   CREATININE 0.7 0.8  --  0.7   CALCIUM 7.4* 7.7*  --  7.9*   PHOS 1.5* 6.2*  --  0.8*       Recent Labs     11/24/22 0424   AST 34   ALT 23   BILITOT <0.2   ALKPHOS 79         No results for input(s): INR in the last 72 hours. No results for input(s): Ilana Bartolo in the last 72 hours. Urinalysis:      Lab Results   Component Value Date/Time    NITRU Negative 11/19/2022 06:00 AM    WBCUA 3-5 11/19/2022 06:00 AM    BACTERIA 1+ 11/19/2022 06:00 AM    RBCUA 0-2 11/19/2022 06:00 AM    BLOODU TRACE 11/19/2022 06:00 AM    SPECGRAV 1.045 11/19/2022 06:00 AM    GLUCOSEU Negative 11/19/2022 06:00 AM       Radiology:  XR CHEST PORTABLE   Final Result   Bilateral airspace disease, increased compared to prior         XR CHEST PORTABLE   Final Result   Bilateral airspace opacities right greater than left could represent diffuse   pulmonary edema or infection         CT CHEST PULMONARY EMBOLISM W CONTRAST   Final Result   1. No evidence of pulmonary embolism. 2.  Right mainstem intubation, for which retraction 3 cm is recommended. 3.  Enteric tube terminates in the stomach. 4.  Interstitial edema with small right effusion and trace left effusion. Bilateral airspace disease may represent a component of alveolar edema as   well as aspiration pneumonitis. CT HEAD WO CONTRAST   Final Result   No acute intracranial abnormality. Electronically signed by Merlinda Peacemaker, MD on 11/25/2022 at 8:21 AM

## 2022-11-25 NOTE — CARE COORDINATION
Discharge Planning:   Called to patient's sisterShaye #[phone] to assess possible discharge needs. Explained Case Management role/services. Living Situation: Prior to admission, patient was staying with cousin Karen Rogers. ADLs: Independent with ambulation, feeding, dressing and bathing. Cousin would assist as needed. DME: need to follow for DME needs upon discharge. Patient currently ventilated. Active Services: family assistance as needed      Transportation: family typically transports      Medications: verified insurance. PCP: FARHAN Manzo CNP      PLAN/COMMENTS: undetermined at this time. Per conversation with physician, patient may be appropriate for LTACH in the future. SisterShaye expressed concerns with patient's aunt Name: Harley Conn, and requested staff keep an eye on patient and visitor. Per sister, Hamilton Nice is visiting today and family is agreeable to visit. Spoke to Monika Barkley RN, confirmed they are aware of family's concerns and verified the names. SW/CM provided contact information for patient or family to call with any questions. SW/CM will follow and assist as needed.     JUANJO Mojica, DOM, Social Work/Case Management   [phone]  Electronically signed by JUANJO Mojica LSW on 11/25/2022 at 8:27 AM

## 2022-11-25 NOTE — PROGRESS NOTES
4 Eyes Skin Assessment     NAME:  Wendy Barajas  YOB: 1975  MEDICAL RECORD NUMBER:  [id number]    The patient is being assessed for  Shift Handoff    I agree that One RN have performed a thorough Head to Toe Skin Assessment on the patient. ALL assessment sites listed below have been assessed. Areas assessed by both nurses:    Head, Face, Ears, Shoulders, Back, Chest, Arms, Elbows, Hands, Sacrum. Buttock, Coccyx, Ischium, and Legs. Feet and Heels        Does the Patient have a Wound?  No noted wound(s)       [place] Prevention initiated by RN: Yes   Wound Care Orders initiated by RN: No    Pressure Injury (Stage 3,4, Unstageable, DTI, NWPT, and Complex wounds) if present place referral order by RN under : No    New and Established Ostomies, if present place, referral order under : No      Nurse 1 eSignature: Electronically signed by Kristian Jara RN on 11/25/22 at 6:10 AM EST    **SHARE this note so that the co-signing nurse is able to place an eSignature**    Nurse 2 eSignature: Electronically signed by Shanon Thomason RN on 11/25/22 at 7:50 PM EST

## 2022-11-25 NOTE — OR NURSING
Nimbex Waste Documentation    Administered 40 ml of 100 ml and wasted 60 ml   per [facility].      Electronically signed by Nestor Bethea RN on 11/24/2022 at 11:01 PM    Electronically signed by Kike Lino RN on 11/25/2022 at 6:10 AM

## 2022-11-25 NOTE — PROGRESS NOTES
Nephrology ([facility]) Progress Note    CC: hypernatremia    Subjective:    HPI:  Ventilated. Remains unresponsive despite weaning sedation and paralytics. Serum sodium not significantly different although osmolality improved. ROS:  In bed. [address]:  medications reviewed. Objective:  Blood pressure 121/65, pulse 74, temperature 97.5 °F (36.4 °C), temperature source Axillary, resp. rate 22, height 5' 3\" (1.6 m), weight 206 lb 5.6 oz (93.6 kg), SpO2 91 %. Intake/Output Summary (Last 24 hours) at 11/25/2022 1045  Last data filed at 11/25/2022 1001  Gross per 24 hour   Intake 5535.79 ml   Output 3675 ml   Net 1860.79 ml     General:  NAD, ventilated  Chest:  coarse BS  CVS:  RRR  Abdominal:  NTND, soft, +BS  Extremities:  no edema  Skin:  no rash    Labs:  Renal panel:  Lab Results   Component Value Date/Time     (HH) 11/25/2022 03:41 AM    K 3.4 (L) 11/25/2022 03:41 AM    K 3.5 11/19/2022 05:01 AM    CO2 29 11/25/2022 03:41 AM    BUN 23 (H) 11/25/2022 03:41 AM    CREATININE 0.7 11/25/2022 03:41 AM    CALCIUM 7.9 (L) 11/25/2022 03:41 AM    PHOS 0.8 (LL) 11/25/2022 03:41 AM    MG 2.90 (H) 11/25/2022 03:41 AM     CBC:  Lab Results   Component Value Date/Time    WBC 10.3 11/25/2022 03:41 AM    HGB 8.5 (L) 11/25/2022 03:41 AM    HCT 27.6 (L) 11/25/2022 03:41 AM    PLT 85 (L) 11/25/2022 03:41 AM       Assessment/Plan:  Reviewed old records and labs.     1) hypernatremia              - free water deficit 6.8 liters + 1/2 UO              - increase D5W to 150 ml/h, while water will not theoretically worsen ARDS as it is not volume, it is \"quantity of fluid\", and given his FIO2 80%, I did want to error on the side of being conservative               - continue free water flushes, but it will not correct free water deficit     2) ARDS              - per pulmonary              - if fluid removal is desired, ultrafiltration may be preferable as we would be able to remove isotonic fluid from the patient, unlike with diuretic that she had diuresis > natriuresis.        3) ID              - tamiflu finished              - on empiric cefepime and flagyl              - blood cultures NGTD     4) septic shock              - off levophed gtt              - on solumedrol     5) seizure disorder              - on keppra     6) FEN              - acid/base                          - permissive hypercapnia              - hyperphosphatemia                          - monitor   - keep trophic TF at minimum rate as it will exacerbate hypernatremia   - hyperglycemia    - may need insulin gtt    7) s/p PEA arrest   - EEG pending     critical care:  30+ min

## 2022-11-25 NOTE — PLAN OF CARE
Problem: Safety - Medical Restraint  Goal: Remains free of injury from restraints (Restraint for Interference with Medical Device)  Description: INTERVENTIONS:  1. Determine that other, less restrictive measures have been tried or would not be effective before applying the restraint  2. Evaluate the patient's condition at the time of restraint application  3. Inform patient/family regarding the reason for restraint  4.  Q2H: Monitor safety, psychosocial status, comfort, nutrition and hydration  Outcome: Progressing  Flowsheets  Taken 11/24/2022 2000 by Perlita Saavedra RN  Remains free of injury from restraints (restraint for interference with medical device):   Determine that other, less restrictive measures have been tried or would not be effective before applying the restraint   Evaluate the patient's condition at the time of restraint application   Inform patient/family regarding the reason for restraint   Every 2 hours: Monitor safety, psychosocial status, comfort, nutrition and hydration  Taken 11/24/2022 1800 by Dennis Ortez RN  Remains free of injury from restraints (restraint for interference with medical device):   Determine that other, less restrictive measures have been tried or would not be effective before applying the restraint   Evaluate the patient's condition at the time of restraint application   Inform patient/family regarding the reason for restraint   Every 2 hours: Monitor safety, psychosocial status, comfort, nutrition and hydration  Taken 11/24/2022 1600 by Dennis Ortez RN  Remains free of injury from restraints (restraint for interference with medical device):   Determine that other, less restrictive measures have been tried or would not be effective before applying the restraint   Evaluate the patient's condition at the time of restraint application   Inform patient/family regarding the reason for restraint   Every 2 hours: Monitor safety, psychosocial status, comfort, nutrition and hydration  Taken 11/24/2022 1400 by Mery Reynoso RN  Remains free of injury from restraints (restraint for interference with medical device):   Determine that other, less restrictive measures have been tried or would not be effective before applying the restraint   Evaluate the patient's condition at the time of restraint application   Inform patient/family regarding the reason for restraint   Every 2 hours: Monitor safety, psychosocial status, comfort, nutrition and hydration     Problem: Nutrition Deficit:  Goal: Optimize nutritional status  Outcome: Progressing  Flowsheets (Taken 11/23/2022 1239 by Emily Domingo RD, LD)  Nutrient intake appropriate for improving, restoring, or maintaining nutritional needs:   Assess nutritional status and recommend course of action   Monitor oral intake, labs, and treatment plans   Recommend, monitor, and adjust tube feedings and TPN/PPN based on assessed needs     Problem: Pain  Goal: Verbalizes/displays adequate comfort level or baseline comfort level  Outcome: Progressing  Flowsheets (Taken 11/24/2022 1945)  Verbalizes/displays adequate comfort level or baseline comfort level: Assess pain using appropriate pain scale     Problem: Skin/Tissue Integrity  Goal: Absence of new skin breakdown  Description: 1. Monitor for areas of redness and/or skin breakdown  2.   Assess vascular access sites hourly    Outcome: Progressing     Problem: ABCDS Injury Assessment  Goal: Absence of physical injury  Outcome: Progressing  Flowsheets (Taken 11/22/2022 1329 by Liliana Bowden RN)  Absence of Physical Injury: Implement safety measures based on patient assessment     Problem: Safety - Adult  Goal: Free from fall injury  Outcome: Progressing  Flowsheets (Taken 11/24/2022 0304)  Free From Fall Injury: Instruct family/caregiver on patient safety     Problem: Discharge Planning  Goal: Discharge to home or other facility with appropriate resources  Outcome: Not Progressing  Flowsheets (Taken 11/24/2022 1945)  Discharge to home or other facility with appropriate resources:   Identify barriers to discharge with patient and caregiver   Arrange for needed discharge resources and transportation as appropriate   Identify discharge learning needs (meds, wound care, etc)

## 2022-11-25 NOTE — PLAN OF CARE
Problem: Discharge Planning  Goal: Discharge to home or other facility with appropriate resources  11/25/2022 1132 by Jack Urias RN  Outcome: Not Progressing  Flowsheets (Taken 11/25/2022 0800)  Discharge to home or other facility with appropriate resources:   Identify barriers to discharge with patient and caregiver   Arrange for needed discharge resources and transportation as appropriate   Identify discharge learning needs (meds, wound care, etc)   Refer to discharge planning if patient needs post-hospital services based on physician order or complex needs related to functional status, cognitive ability or social support system  11/25/2022 0315 by Messi Gonzalez RN  Outcome: Not Progressing  Flowsheets (Taken 11/24/2022 1945)  Discharge to home or other facility with appropriate resources:   Identify barriers to discharge with patient and caregiver   Arrange for needed discharge resources and transportation as appropriate   Identify discharge learning needs (meds, wound care, etc)     Problem: Pain  Goal: Verbalizes/displays adequate comfort level or baseline comfort level  11/25/2022 1132 by Jack Urias RN  Outcome: Not Progressing  11/25/2022 0315 by Messi Gonzalez RN  Outcome: Progressing  Flowsheets (Taken 11/24/2022 1945)  Verbalizes/displays adequate comfort level or baseline comfort level: Assess pain using appropriate pain scale     Problem: Safety - Violent/Self-destructive Restraint  Goal: Remains free of injury from restraints (Restraint for Violent/Self-Destructive Behavior)  Description: INTERVENTIONS:  1. Determine that de-escalation and other, less restrictive measures have been tried or would not be effective before applying the restraint  2. Identify and document the criteria for restraint  3. Evaluate the patient's condition at the time of restraint application  4. Inform patient/family regarding the reason for restraint/seclusion  5.  Q2H: Monitor comfort, nutrition and hydration needs  6. Q15M: Perform safety checks including skin, circulation, sensory, respiratory and psychological status  7. Ensure continuous observation  8. Identify and implement measures to help patient regain control, assess readiness for release and initiate progressive release per policy  Outcome: Progressing     Problem: Safety - Medical Restraint  Goal: Remains free of injury from restraints (Restraint for Interference with Medical Device)  Description: INTERVENTIONS:  1. Determine that other, less restrictive measures have been tried or would not be effective before applying the restraint  2. Evaluate the patient's condition at the time of restraint application  3. Inform patient/family regarding the reason for restraint  4.  Q2H: Monitor safety, psychosocial status, comfort, nutrition and hydration  11/25/2022 1132 by Shanon Thomason RN  Outcome: Progressing  Flowsheets (Taken 11/25/2022 0800)  Remains free of injury from restraints (restraint for interference with medical device):   Evaluate the patient's condition at the time of restraint application   Inform patient/family regarding the reason for restraint   Every 2 hours: Monitor safety, psychosocial status, comfort, nutrition and hydration  11/25/2022 0315 by Kristian Jara RN  Outcome: Progressing  Flowsheets  Taken 11/24/2022 2000 by Kristian Jara RN  Remains free of injury from restraints (restraint for interference with medical device):   Determine that other, less restrictive measures have been tried or would not be effective before applying the restraint   Evaluate the patient's condition at the time of restraint application   Inform patient/family regarding the reason for restraint   Every 2 hours: Monitor safety, psychosocial status, comfort, nutrition and hydration  Taken 11/24/2022 1800 by Norman Hill RN  Remains free of injury from restraints (restraint for interference with medical device):   Determine that other, less restrictive measures have been tried or would not be effective before applying the restraint   Evaluate the patient's condition at the time of restraint application   Inform patient/family regarding the reason for restraint   Every 2 hours: Monitor safety, psychosocial status, comfort, nutrition and hydration  Taken 11/24/2022 1600 by Chris Galindo RN  Remains free of injury from restraints (restraint for interference with medical device):   Determine that other, less restrictive measures have been tried or would not be effective before applying the restraint   Evaluate the patient's condition at the time of restraint application   Inform patient/family regarding the reason for restraint   Every 2 hours: Monitor safety, psychosocial status, comfort, nutrition and hydration  Taken 11/24/2022 1400 by Chris Galindo RN  Remains free of injury from restraints (restraint for interference with medical device):   Determine that other, less restrictive measures have been tried or would not be effective before applying the restraint   Evaluate the patient's condition at the time of restraint application   Inform patient/family regarding the reason for restraint   Every 2 hours: Monitor safety, psychosocial status, comfort, nutrition and hydration     Problem: Nutrition Deficit:  Goal: Optimize nutritional status  11/25/2022 1132 by Jack Urias RN  Outcome: Progressing  11/25/2022 0315 by Messi Gonzalez RN  Outcome: Progressing  Flowsheets (Taken 11/23/2022 1239 by Kaity Salas, RD, LD)  Nutrient intake appropriate for improving, restoring, or maintaining nutritional needs:   Assess nutritional status and recommend course of action   Monitor oral intake, labs, and treatment plans   Recommend, monitor, and adjust tube feedings and TPN/PPN based on assessed needs     Problem: Skin/Tissue Integrity  Goal: Absence of new skin breakdown  Description: 1. Monitor for areas of redness and/or skin breakdown  2.   Assess vascular access sites hourly    11/25/2022 1132 by Kei Rangel RN  Outcome: Progressing  11/25/2022 0315 by Martha Montoya RN  Outcome: Progressing     Problem: ABCDS Injury Assessment  Goal: Absence of physical injury  11/25/2022 1132 by Kei Rangel RN  Outcome: Progressing  Flowsheets (Taken 11/25/2022 0800)  Absence of Physical Injury: Implement safety measures based on patient assessment  11/25/2022 0315 by Martha Montoya RN  Outcome: Progressing  Flowsheets (Taken 11/22/2022 1329 by Diana Mcfarland RN)  Absence of Physical Injury: Implement safety measures based on patient assessment     Problem: Safety - Adult  Goal: Free from fall injury  11/25/2022 1132 by Kei Rangel RN  Outcome: Progressing  Flowsheets (Taken 11/25/2022 0800)  Free From Fall Injury: Instruct family/caregiver on patient safety  11/25/2022 0315 by Martha Montoya RN  Outcome: Progressing  [address] (Taken 11/24/2022 0304)  Free From Fall Injury: Instruct family/caregiver on patient safety     Problem: Discharge Planning  Goal: Discharge to home or other facility with appropriate resources  11/25/2022 1132 by Kei Rangel RN  Outcome: Not Progressing  Flowsheets (Taken 11/25/2022 0800)  Discharge to home or other facility with appropriate resources:   Identify barriers to discharge with patient and caregiver   Arrange for needed discharge resources and transportation as appropriate   Identify discharge learning needs (meds, wound care, etc)   Refer to discharge planning if patient needs post-hospital services based on physician order or complex needs related to functional status, cognitive ability or social support system  11/25/2022 0315 by Martha Montoya RN  Outcome: Not Progressing  Flowsheets (Taken 11/24/2022 1945)  Discharge to home or other facility with appropriate resources:   Identify barriers to discharge with patient and caregiver   Arrange for needed discharge resources and transportation as appropriate   Identify discharge learning needs (meds, wound care, etc)     Problem: Pain  Goal: Verbalizes/displays adequate comfort level or baseline comfort level  11/25/2022 1132 by Hamilton Duval RN  Outcome: Not Progressing  11/25/2022 0315 by Michael Ward RN  Outcome: Progressing  Flowsheets (Taken 11/24/2022 1945)  Verbalizes/displays adequate comfort level or baseline comfort level: Assess pain using appropriate pain scale

## 2022-11-25 NOTE — PROGRESS NOTES
Progress Note  The patient is being evaluated for encephalopathy s/p cardiac arrest.     Updates    Paralytic off not quite 48 hrs ago. Propofol stopped yesterday afternoon. Fentanyl gtt held 0700 this morning. Per RN no clinical improvement thus far; no cough or gag. EEG at bedside.      Active Ambulatory Problems     Diagnosis Date Noted    No Active Ambulatory Problems     Resolved Ambulatory Problems     Diagnosis Date Noted    No Resolved Ambulatory Problems     No Additional Past Medical History       Current Facility-Administered Medications:     potassium phosphate 30 mmol in dextrose 5 % 250 mL IVPB, 30 mmol, IntraVENous, Once, Tana Carter MD, Last Rate: 41.7 mL/hr at 11/25/22 0742, Rate Verify at 11/25/22 0742    insulin lispro (HUMALOG) injection vial 0-16 Units, 0-16 Units, SubCUTAneous, Q4H, FARHAN Allison - CNP, 16 Units at 11/25/22 0825    methylPREDNISolone sodium (SOLU-MEDROL) injection 40 mg, 40 mg, IntraVENous, Daily, Gayla Crooked, DO, 40 mg at 11/25/22 3910    docusate (COLACE) 50 MG/5ML liquid 100 mg, 100 mg, Oral, Daily, Gayla Crooked, DO, 100 mg at 11/25/22 3271    dextrose 5 % solution, , IntraVENous, Continuous, Kristine Lieberman MD, Last Rate: 100 mL/hr at 11/25/22 0814, New Bag at 11/25/22 0814    acetaminophen (TYLENOL) tablet 650 mg, 650 mg, Oral, Q4H PRN, 650 mg at 11/22/22 0242 **OR** acetaminophen (TYLENOL) suppository 650 mg, 650 mg, Rectal, Q4H PRN, Dionne Lynn MD    lubriesh P.M. (artificial tears) ophthalmic ointment, , Both Eyes, 6 times per day, Clydia RomanFARHAN - CNP, Given at 11/25/22 0833    metronidazole (FLAGYL) 500 mg in 0.9% NaCl 100 mL IVPB premix, 500 mg, IntraVENous, Q8H, FARHAN Allison - CNP, Stopped at 11/25/22 0234    [COMPLETED] cefepime (MAXIPIME) 2000 mg IVPB minibag, 2,000 mg, IntraVENous, Once, Stopped at 11/21/22 1109 **FOLLOWED BY** cefepime (MAXIPIME) 2000 mg IVPB minibag, 2,000 mg, IntraVENous, Q8H, Prosper Jimenez, APRN - CNP, Stopped at 11/25/22 0542    norepinephrine (LEVOPHED) 16 mg in dextrose 5% 250 mL infusion, 1-50 mcg/min, IntraVENous, Continuous, Jamie Estevez MD, Last Rate: 7.5 mL/hr at 11/25/22 0742, 8 mcg/min at 11/25/22 0742    enoxaparin (LOVENOX) injection 40 mg, 40 mg, SubCUTAneous, Daily, Jewel Hinds MD, 40 mg at 11/24/22 0902    ondansetron (ZOFRAN-ODT) disintegrating tablet 4 mg, 4 mg, Oral, Q8H PRN **OR** ondansetron (ZOFRAN) injection 4 mg, 4 mg, IntraVENous, Q6H PRN, Moncho M Harsh, DO    chlorhexidine (PERIDEX) 0.12 % solution 15 mL, 15 mL, Mouth/Throat, BID, Moncho M Harsh, DO, 15 mL at 11/25/22 8986    perflutren lipid microspheres (DEFINITY) injection 1.5 mL, 1.5 mL, IntraVENous, ONCE PRN, Moncho M Harsh, DO    glucose chewable tablet 16 g, 4 tablet, Oral, PRN, Moncho M Harsh, DO    dextrose bolus 10% 125 mL, 125 mL, IntraVENous, PRN **OR** dextrose bolus 10% 250 mL, 250 mL, IntraVENous, PRN, Moncho M Harsh, DO    glucagon (rDNA) injection 1 mg, 1 mg, SubCUTAneous, PRN, Moncho M Harsh, DO    dextrose 10 % infusion, , IntraVENous, Continuous PRN, Moncho M Harsh, DO    lactobacillus (CULTURELLE) capsule 2 capsule, 2 capsule, Oral, BID, Jamie Estevez MD, 2 capsule at 11/25/22 0833    pantoprazole (PROTONIX) injection 40 mg, 40 mg, IntraVENous, Daily, Jamie Estevez MD, 40 mg at 11/25/22 0835    levETIRAcetam (KEPPRA) 1000 mg/100 mL IVPB, 1,000 mg, IntraVENous, Q12H, Jamie Estevez MD, Last Rate: 400 mL/hr at 11/25/22 0832, 1,000 mg at 11/25/22 0832      ROS -   Limited given encephalopathy.       potassium phosphate IVPB  30 mmol IntraVENous Once    insulin lispro  0-16 Units SubCUTAneous Q4H    methylPREDNISolone  40 mg IntraVENous Daily    docusate  100 mg Oral Daily    artificial tears   Both Eyes 6 times per day    metroNIDAZOLE  500 mg IntraVENous Q8H    cefepime  2,000 mg IntraVENous Q8H    enoxaparin  40 mg SubCUTAneous Daily chlorhexidine  15 mL Mouth/Throat BID    lactobacillus  2 capsule Oral BID    pantoprazole  40 mg IntraVENous Daily    levETIRAcetam  1,000 mg IntraVENous Q12H         dextrose 100 mL/hr at 11/25/22 0814    norepinephrine 8 mcg/min (11/25/22 0742)    dextrose          acetaminophen **OR** acetaminophen, ondansetron **OR** ondansetron, perflutren lipid microspheres, glucose, dextrose bolus **OR** dextrose bolus, glucagon (rDNA), dextrose     Exam:  Blood pressure 121/65, pulse 78, temperature 98.7 °F (37.1 °C), temperature source Rectal, resp. rate 22, height 5' 3\" (1.6 m), weight 206 lb 5.6 oz (93.6 kg), SpO2 95 %. Constitutional   Constitutional    Vital signs: BP, HR, and RR reviewed   General depressed mental status, intubated. Eyes: fundoscopic exam difficult given inability to cooperate  Cardiovascular: pulses symmetric in all 4 extremities. + mild peripheral edema. Psychiatric: limited exam given encephalopathy but not agitated and no signs of hallucinations  Neurologic  Mental status: limited exam given encephalopathy. Does not open eyes. orientation unable to assess given comatose state  General fund of knowledge limited exam given encephalopathy   Memory limited exam given encephalopathy  Attention poor  Language limited exam given encephalopathy  Comprehension not following any commands  CN2: Visual Fields: no blink to either side with threat  CN 3,4,6:  Gaze central, conjugate. Pupils are 4.5-5mm. No notable pupillary light reflex. OCR limited given ETT safety, EEG set up. CN5: facial sensation limited exam given encephalopathy  CN7:face symmetric but exam limited by ET tube. CN8: hearing limited exam given encephalopathy. No corneal reflex bilaterally. CN9: limited exam given encephalopathy  CN11: limited exam given encephalopathy  CN12: limited exam given encephalopathy  Strength: No movement to noxious stimuli. No spontaneous movement.    Deep tendon reflexes:   +Hoffmans pincher response in [address] [place] [address]. BLE babinski testing inconclusive. Sensory: No observed reactions to noxious stimuli   Cerebellar/coordination:limited exam given encephalopathy. No myoclonic jerking noted. Tone: decreased in BUE. BLE limited. Gait: limited exam given encephalopathy and intubated with ventilator. Labs  Na: 162  K: 3.4  BUN: 23  Creatinine: 0.7  Glucose: 351  Ca: 7.9  Ph: 0.8    ALT: 23  AST:34    WBC: 10.3  RBC:3.57  Hgb:8.5  Hct: 27.6  Platelet: 85    Blood culture: No growth  Resp Culture: Normal wilma. Studies  CT Head w/o 11/19/22: Independently reviewed. No acute intracranial abnormality. Impression  1. Encephalopathy s/p Cardiac arrest: No clinical improvement thus far with removal of sedating medications. <24hrs. Paralytic nearly 48hrs removed. Concerning for anoxic ischemic changes vs. Prolonged sedation effect (though favored less likely). 2. Reported myoclonic jerking: No longer observable per physical exam today. On keppra. 3. Influenza  4. 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 Recommendations  - MRI Brain w/o (ordered) Can be helpful for prognostication. - EEG In process. - Continue Keppra 1g BID.   - Continue to avoid sedating medications, specifically gtt's. - Continue supportive efforts.   - Palliative care is involved.      Doug Franco, [address] Neurology    A copy of this note was provided for Dr Betzaida Guan MD

## 2022-11-26 NOTE — PROGRESS NOTES
Nephrology ([facility]) Progress Note    CC: hypernatremia    Subjective:    HPI:  Ventilated. Remains unresponsive despite weaning sedation and paralytics. Serum sodium not significantly different although osmolality improved. ROS:  In bed. [address]:  medications reviewed. Objective:  Blood pressure 116/76, pulse 70, temperature 97.4 °F (36.3 °C), temperature source Oral, resp. rate 22, height 5' 3\" (1.6 m), weight 210 lb 1.6 oz (95.3 kg), SpO2 (!) 89 %. Intake/Output Summary (Last 24 hours) at 11/26/2022 1428  Last data filed at 11/26/2022 1202  Gross per 24 hour   Intake 3882.16 ml   Output 3760 ml   Net 122.16 ml       General:  NAD, ventilated  Chest:  coarse BS  CVS:  RRR  Abdominal:  NTND, soft, +BS  Extremities:  no edema  Skin:  no rash    Labs:  Renal panel:  Lab Results   Component Value Date/Time     (H) 11/26/2022 05:12 AM    K 3.8 11/26/2022 05:12 AM    K 3.5 11/19/2022 05:01 AM    CO2 26 11/26/2022 05:12 AM    BUN 23 (H) 11/26/2022 05:12 AM    CREATININE <0.5 (L) 11/26/2022 05:12 AM    CALCIUM 7.4 (L) 11/26/2022 05:12 AM    PHOS 2.8 11/26/2022 05:12 AM    MG 2.20 11/26/2022 05:12 AM     CBC:  Lab Results   Component Value Date/Time    WBC 7.3 11/26/2022 05:12 AM    HGB 7.3 (L) 11/26/2022 05:12 AM    HCT 24.0 (L) 11/26/2022 05:12 AM    PLT 49 (L) 11/26/2022 05:12 AM       Assessment/Plan:  Reviewed old records and labs. 1) hypernatremia              - free water deficit 6.8 liters + 1/2 UO              - increase D5W to 200 ml/hr              - continue free water flushes, but it will not correct free water deficit     2) ARDS              - per pulmonary              - if fluid removal is desired, ultrafiltration may be preferable as we would be able to remove isotonic fluid from the patient, unlike with diuretic that she had diuresis > natriuresis.        3) ID              - tamiflu finished              - on empiric cefepime and flagyl              - blood cultures NGTD     4) septic shock              - off levophed gtt              - on solumedrol     5) seizure disorder              - on keppra     6) FEN              - acid/base                          - permissive hypercapnia              - hyperphosphatemia                          - monitor   - keep trophic TF at minimum rate as it will exacerbate hypernatremia   - hyperglycemia    - may need insulin gtt    7) s/p PEA arrest   - EEG pending     critical care:  30+ min

## 2022-11-26 NOTE — PROGRESS NOTES
4 Eyes Skin Assessment     NAME:  Eric Melgar  YOB: 1975  MEDICAL RECORD NUMBER:  [id number]    The patient is being assessed for  Shift Handoff    I agree that One RN have performed a thorough Head to Toe Skin Assessment on the patient. ALL assessment sites listed below have been assessed. Areas assessed by both nurses:    Head, Face, Ears, Shoulders, Back, Chest, Arms, Elbows, Hands, Sacrum. Buttock, Coccyx, Ischium, and Legs. Feet and Heels        Does the Patient have a Wound?  No noted wound(s)       Brenden Prevention initiated by RN: Yes   Wound Care Orders initiated by RN: NA    Pressure Injury (Stage 3,4, Unstageable, DTI, NWPT, and Complex wounds) if present place referral order by RN under : NA    New and Established Ostomies, if present place, referral order under : NA      Nurse 1 eSignature: Electronically signed by Irene Amaro RN on 11/26/22 at 6:59 PM EST    **SHARE this note so that the co-signing nurse is able to place an eSignature**    Nurse 2 eSignature: Electronically signed by Simi Castellano RN on 11/27/22 at 1:30 AM EST

## 2022-11-26 NOTE — PLAN OF CARE
Problem: Safety - Violent/Self-destructive Restraint  Goal: Remains free of injury from restraints (Restraint for Violent/Self-Destructive Behavior)  Description: INTERVENTIONS:  1. Determine that de-escalation and other, less restrictive measures have been tried or would not be effective before applying the restraint  2. Identify and document the criteria for restraint  3. Evaluate the patient's condition at the time of restraint application  4. Inform patient/family regarding the reason for restraint/seclusion  5. Q2H: Monitor comfort, nutrition and hydration needs  6. Q15M: Perform safety checks including skin, circulation, sensory, respiratory and psychological status  7. Ensure continuous observation  8. Identify and implement measures to help patient regain control, assess readiness for release and initiate progressive release per policy  09/94/[id number] by Adali Zee RN  Outcome: Progressing  11/25/2022 1132 by Milton Garcia RN  Outcome: Progressing     Problem: Safety - Medical Restraint  Goal: Remains free of injury from restraints (Restraint for Interference with Medical Device)  Description: INTERVENTIONS:  1. Determine that other, less restrictive measures have been tried or would not be effective before applying the restraint  2. Evaluate the patient's condition at the time of restraint application  3. Inform patient/family regarding the reason for restraint  4.  Q2H: Monitor safety, psychosocial status, comfort, nutrition and hydration  11/25/2022 2157 by Adali Zee RN  Outcome: Progressing  11/25/2022 1132 by Milton Garcia RN  Outcome: Progressing  Flowsheets (Taken 11/25/2022 0800)  Remains free of injury from restraints (restraint for interference with medical device):   Evaluate the patient's condition at the time of restraint application   Inform patient/family regarding the reason for restraint   Every 2 hours: Monitor safety, psychosocial status, comfort, nutrition and hydration     Problem: Discharge Planning  Goal: Discharge to home or other facility with appropriate resources  11/25/2022 2157 by Efraín Sorensen RN  Outcome: Progressing  Flowsheets (Taken 11/25/2022 1945)  Discharge to home or other facility with appropriate resources:   Identify barriers to discharge with patient and caregiver   Arrange for needed discharge resources and transportation as appropriate   Identify discharge learning needs (meds, wound care, etc)   Refer to discharge planning if patient needs post-hospital services based on physician order or complex needs related to functional status, cognitive ability or social support system  11/25/2022 1132 by Lucille Lang RN  Outcome: Not Progressing  Flowsheets (Taken 11/25/2022 0800)  Discharge to home or other facility with appropriate resources:   Identify barriers to discharge with patient and caregiver   Arrange for needed discharge resources and transportation as appropriate   Identify discharge learning needs (meds, wound care, etc)   Refer to discharge planning if patient needs post-hospital services based on physician order or complex needs related to functional status, cognitive ability or social support system     Problem: Nutrition Deficit:  Goal: Optimize nutritional status  11/25/2022 2157 by Efraín Sorensen RN  Outcome: Progressing  11/25/2022 1132 by Lucille Lang RN  Outcome: Progressing     Problem: Pain  Goal: Verbalizes/displays adequate comfort level or baseline comfort level  11/25/2022 2157 by Efraín Sorensen RN  Outcome: Progressing  Flowsheets (Taken 11/25/2022 1945)  Verbalizes/displays adequate comfort level or baseline comfort level:   Encourage patient to monitor pain and request assistance   Assess pain using appropriate pain scale   Administer analgesics based on type and severity of pain and evaluate response   Implement non-pharmacological measures as appropriate and evaluate response   Notify Licensed Independent Practitioner if interventions unsuccessful or patient reports new pain  11/25/2022 1132 by More Beltre RN  Outcome: Not Progressing     Problem: Skin/Tissue Integrity  Goal: Absence of new skin breakdown  Description: 1. Monitor for areas of redness and/or skin breakdown  2.   Assess vascular access sites hourly    11/25/2022 2157 by Maryan Thorpe RN  Outcome: Progressing  11/25/2022 1132 by More Beltre RN  Outcome: Progressing     Problem: ABCDS Injury Assessment  Goal: Absence of physical injury  11/25/2022 2157 by Maryan Thorpe RN  Outcome: Progressing  Flowsheets (Taken 11/25/2022 1945)  Absence of Physical Injury: Implement safety measures based on patient assessment  11/25/2022 1132 by More Beltre RN  Outcome: Progressing  Flowsheets (Taken 11/25/2022 0800)  Absence of Physical Injury: Implement safety measures based on patient assessment     Problem: Safety - Adult  Goal: Free from fall injury  11/25/2022 2157 by Maryan Thorpe RN  Outcome: Progressing  Flowsheets (Taken 11/25/2022 1945)  Free From Fall Injury: Instruct family/caregiver on patient safety  11/25/2022 1132 by More Beltre RN  Outcome: Progressing  Flowsheets (Taken 11/25/2022 0800)  Free From Fall Injury: Instruct family/caregiver on patient safety     Problem: Discharge Planning  Goal: Discharge to home or other facility with appropriate resources  11/25/2022 2157 by Maryan Thorpe RN  Outcome: Progressing  Flowsheets (Taken 11/25/2022 1945)  Discharge to home or other facility with appropriate resources:   Identify barriers to discharge with patient and caregiver   Arrange for needed discharge resources and transportation as appropriate   Identify discharge learning needs (meds, wound care, etc)   Refer to discharge planning if patient needs post-hospital services based on physician order or complex needs related to functional status, cognitive ability or social support system  11/25/2022 1132 by More Beltre RN  Outcome: Not Progressing  Flowsheets (Taken 11/25/2022 0800)  Discharge to home or other facility with appropriate resources:   Identify barriers to discharge with patient and caregiver   Arrange for needed discharge resources and transportation as appropriate   Identify discharge learning needs (meds, wound care, etc)   Refer to discharge planning if patient needs post-hospital services based on physician order or complex needs related to functional status, cognitive ability or social support system     Problem: Pain  Goal: Verbalizes/displays adequate comfort level or baseline comfort level  11/25/2022 2157 by Cyndi Duhnam RN  Outcome: Progressing  Flowsheets (Taken 11/25/2022 1945)  Verbalizes/displays adequate comfort level or baseline comfort level:   Encourage patient to monitor pain and request assistance   Assess pain using appropriate pain scale   Administer analgesics based on type and severity of pain and evaluate response   Implement non-pharmacological measures as appropriate and evaluate response   Notify Licensed Independent Practitioner if interventions unsuccessful or patient reports new pain  11/25/2022 1132 by Jeffrey Ashford RN  Outcome: Not Progressing

## 2022-11-26 NOTE — CARE COORDINATION
Received call from bedside nurse Juan José Randolph. Patient has a son Mary Ellen Henriquez that would be legal next of kin according to [facility] hierarchy. Son if incarcerated in [employer] . CM made call to [facility], spoke with Wallace Jay [phone]. Set up a phone meeting with the Porfirio Marr and patient's son Beatriz Layton at 1:30pm with Dr Joanne Wilson. Notified bedside nurse Juan José Randolph of phone meeting. #[phone]  Electronically signed by Marychuy Melvin RN on 11/26/2022 at 11:08 AM    Dr Joanne Wilson had phone meeting with son Grover Montanez. MD explained graveness of patient's condition. Son acknowledged understanding of current situation. Son wants to be contacted for any decision making, but understands if he cannot be reached defer to the next legal next of kin, patient's sister Tuan Cruz. Son with no further questions or concerns at this time.     #[phone]  Electronically signed by Marychuy Melvin RN on 11/26/2022 at 1:48 PM

## 2022-11-26 NOTE — PROGRESS NOTES
4 Eyes Skin Assessment     NAME:  Jessica Mason  YOB: 1975  MEDICAL RECORD NUMBER:  [id number]    The patient is being assessed for  Shift Handoff    I agree that One RN have performed a thorough Head to Toe Skin Assessment on the patient. ALL assessment sites listed below have been assessed. Areas assessed by both nurses:    Head, Face, Ears, Shoulders, Back, Chest, Arms, Elbows, Hands, Sacrum. Buttock, Coccyx, Ischium, and Legs. Feet and Heels        Does the Patient have a Wound?  No noted wound(s)       Brenden Prevention initiated by RN: Yes   Wound Care Orders initiated by RN: No    Pressure Injury (Stage 3,4, Unstageable, DTI, NWPT, and Complex wounds) if present place referral order by RN under : NA    New and Established Ostomies, if present place, referral order under : NA      Nurse 1 eSignature: Electronically signed by Naty Jones RN on 11/26/22 at 6:39 AM EST    **SHARE this note so that the co-signing nurse is able to place an eSignature**    Nurse 2 eSignature: Electronically signed by Kathrine Mata RN on 11/26/22 at 6:59 PM EST

## 2022-11-26 NOTE — PROGRESS NOTES
4 Eyes Skin Assessment     NAME:  Rosendo Arambula  YOB: 1975  MEDICAL RECORD NUMBER:  [id number]    The patient is being assessed for  Shift Handoff    I agree that One RN have performed a thorough Head to Toe Skin Assessment on the patient. ALL assessment sites listed below have been assessed. Areas assessed by both nurses:    Head, Face, Ears, Shoulders, Back, Chest, Arms, Elbows, Hands, Sacrum. Buttock, Coccyx, Ischium, and Legs. Feet and Heels        Does the Patient have a Wound?  No noted wound(s)       Brenden Prevention initiated by RN: Yes   Wound Care Orders initiated by RN: NA    Pressure Injury (Stage 3,4, Unstageable, DTI, NWPT, and Complex wounds) if present place referral order by RN under : NA    New and Established Ostomies, if present place, referral order under : NA      Nurse 1 eSignature: Electronically signed by Mary Patiño RN on 11/25/22 at 7:51 PM EST    **SHARE this note so that the co-signing nurse is able to place an eSignature**    Nurse 2 eSignature: Electronically signed by Ronnie Helm RN on 11/25/22 at 9:56 PM EST

## 2022-11-26 NOTE — PROCEDURES
Malka Pena is a 52 y.o. female patient. 1. Cardiac arrest ([facility]    2. Aspiration pneumonia, unspecified aspiration pneumonia type, unspecified laterality, unspecified part of lung ([facility]    3. [facility]    4. Abnormal EKG      No past medical history on file. Blood pressure 122/77, pulse 75, temperature 97.8 °F (36.6 °C), temperature source Oral, resp. rate 22, height 5' 3\" (1.6 m), weight 210 lb 1.6 oz (95.3 kg), SpO2 95 %. EEG awake and asleep portable    Date/Time: 11/26/2022 5:44 PM  Performed by: Sher Medrano MD  Authorized by: Bishnu Balderrama MD     CLINICAL HISTORY:  Malka Pena is a 52 y.o. female hospitalized on 11/19/2022   after cardiac arrest.  Patient called EMS for flulike symptoms. She developed altered mental status while on the ambulance. In the ED she underwent CPR with ROSC achieved approximately 10 minutes. Initial rhythm PEA. Patient had apparently myoclonic type jerking when first hospitalized and was started on Keppra. MRI Brain on 11/25 showed diffuse anoxic brain injury and herniation. There has been no clinical improvement and patient remains unresponsive, has been weaned completely off sedation. Clinical concern is for complex partial or simple partial seizures, or subclinical status epilepticus, or anoxic brain injury. FINDINGS: This is a routine 16 channel referential and bipolar video EEG. There is little discernable background rhythm. Activity appears isoelectric throughout. Photic stimulation is performed without any response. Hyperventilation is not able to be performed. No epileptiform abnormalities are noted. No electrographic seizures are recorded. There is no asymmetry. Mental state is comatose throughout. IMPRESSION:  This is an abnormal EEG due to the presence of severe generalized slowing. Isoelectric activity is seen throughout. No focal or epileptiform abnormalities.  This would be consistent with global anoxic brain injury.       Nikhil So MD  11/26/2022

## 2022-11-26 NOTE — PROGRESS NOTES
Hospitalist Progress Note    Patient:  Alan Álvarez  Unit/Bed:Z1K-56672111/2111-01   YOB: 1975       MRN: [id number] Acct: [de-identified]  PCP: FARHAN Feliciano CNP    Date of Admission: 11/19/2022  --------------------------    Chief Complaint:      Cardiac arrest    Interval history    Alan Álvarez is a 52 y.o. female hospitalized on 11/19/2022   after cardiac arrest.  Patient called EMS for flulike symptoms. She developed altered mental status while on the ambulance. In the ED she underwent CPR with ROSC achieved approximately 10 minutes. Initial rhythm PEA. MRI Brain on 11/25 showed diffuse anoxic brain injury and herniation. There has been no clinical improvement and patient remains unresponsive, completely off sedation now. Assessment/plan:      Cardiopulmonary arrest with ROSC approximately after 10 minutes of resuscitation. Initial rhythm was PEA. Influenza A  Severe acute hypoxic respiratory failure,/ARDS CTA of the chest showed no pulmonary embolism  Shock, cardiogenic and septic  Anoxic encephalopathy - MRI Brain without contrast showed diffuse anoxic brain injury and herniation. Myoclonic jerks/seizure ? Prior history of seizures  Pulmonary edema, echocardiogram reviewed. Normal EF. Hypernatremia  Aspiration pneumonia  Hyperglycemia ?   ,  A1c 6. Nonzero troponinemia, multifactorial  Obesity      PLAN:  Neuro:  -Rewarming process completed 11/20/2022  -Neurology input appreciated, EEG after removal of paralytics  -Stop all sedation  -Palliative care consulted and currently patient remain full code. Per notes the patient has mentation over 15years old.  -patient has diffuse anoxic brain injury and herniation evident on MRI Brain scan.  Her prognosis is terminal and recommend withdrawal of care    Pulm:  -Remain intubated  -Stop all sedation  -Continue broad broad-spectrum antibiotics and completed Tamiflu    CVS:  -Echocardiogram reviewed, normal systolic function,   -Continue vasopressor support as required. Renal:  Creatinine within reasonable range, continue monitoring, monitor urine output. Monitor and replete electrolyte. Hypernatremia: Continue IV fluids. We will change to dextrose. Nephrology consulted. GI:  -GI prophylaxis. ID:  -On cefepime and completed Tamiflu. Procalcitonin improving    Endocrine:  -Off insulin drip and currently on high dose sliding scale insulin    Hem/Onc:  -Monitor CBC       Skin: Continue skin care     Code Status: Full Code     DVT prophylaxis: Lovenox     Disposition: Continue care ICU. Prognosis terminal. She has diffuse anoxic brain injury and herniation evidence on her MRI scan. Recommend withdrawal of care. Will attempt to contact son, who is the next of kin and currently incarcerated to see if he is able to see the patient. Discussed with Dr. Jeff Colon, and resuscitation efforts would be futile if she were to have a cardiac arrest given her anoxic brain injury. Recommend medical DNR. Nutrition. Continue G-tube feeding.     ----------------    Subjective:     No clinical change. Unresponsive. Diet: Diet NPO  ADULT TUBE FEEDING; Orogastric; Peptide Based; Continuous; 10; Yes; 0; Q 4 hours; 10; 300; Q 4 hours; Protein; Bolus 1 proteinex daily. OBJECTIVE     Exam:  BP (!) 94/52   Pulse 70   Temp 97.4 °F (36.3 °C) (Oral)   Resp 22   Ht 5' 3\" (1.6 m)   Wt 210 lb 1.6 oz (95.3 kg)   SpO2 90%   BMI 37.22 kg/m²        Gen: Unresponsive. Head: Normocephalic. Atraumatic. Eyes: dilated and fixed pupils. ENT: ETT and tube feeding  in place, oral feeding tube in place  CVS: RRR   Pulmomary: Intubated  Gastrointestinal: Cannot be assessed the patient is in prone position  Musculoskeletal: No edema. Warm  Neuro: Unresponsive, pupils dilated and fixed. Does not respond to painful stimuli.   Psychiatry:  unable to assess       Medications:  Reviewed    Infusion Medications    dextrose 150 mL/hr at 11/26/22 2582    norepinephrine 7 mcg/min (11/26/22 0409)    dextrose       Scheduled Medications    insulin glargine  10 Units SubCUTAneous Nightly    insulin lispro  0-16 Units SubCUTAneous Q4H    docusate  100 mg Oral Daily    artificial tears   Both Eyes 6 times per day    metroNIDAZOLE  500 mg IntraVENous Q8H    cefepime  2,000 mg IntraVENous Q8H    enoxaparin  40 mg SubCUTAneous Daily    chlorhexidine  15 mL Mouth/Throat BID    lactobacillus  2 capsule Oral BID    pantoprazole  40 mg IntraVENous Daily    levETIRAcetam  1,000 mg IntraVENous Q12H     PRN Meds: sodium phosphate IVPB **OR** sodium phosphate IVPB, acetaminophen **OR** acetaminophen, ondansetron **OR** ondansetron, perflutren lipid microspheres, glucose, dextrose bolus **OR** dextrose bolus, glucagon (rDNA), dextrose      Intake/Output Summary (Last 24 hours) at 11/26/2022 0940  Last data filed at 11/26/2022 0815  Gross per 24 hour   Intake 4922.07 ml   Output 4105 ml   Net 817.07 ml               Labs:   Recent Labs     11/24/22  0424 11/25/22  0341 11/26/22  0512   WBC 18.6* 10.3 7.3   HGB 10.4* 8.5* 7.3*   HCT 33.0* 27.6* 24.0*   * 85* 49*       Recent Labs     11/25/22  0341 11/25/22  1402 11/26/22  0512   * 157* 157*   K 3.4* 4.6 3.8   * 123* 124*   CO2 29 27 26   BUN 23* 22* 23*   CREATININE 0.7 0.6 <0.5*   CALCIUM 7.9* 7.1* 7.4*   PHOS 0.8* 3.3 2.8       Recent Labs     11/24/22  0424   AST 34   ALT 23   BILITOT <0.2   ALKPHOS 79         No results for input(s): INR in the last 72 hours. No results for input(s): Yoel Cater in the last 72 hours.       Urinalysis:      Lab Results   Component Value Date/Time    NITRU Negative 11/19/2022 06:00 AM    WBCUA 3-5 11/19/2022 06:00 AM    BACTERIA 1+ 11/19/2022 06:00 AM    RBCUA 0-2 11/19/2022 06:00 AM    BLOODU TRACE 11/19/2022 06:00 AM    SPECGRAV 1.045 11/19/2022 06:00 AM    GLUCOSEU Negative 11/19/2022 06:00 AM       Radiology:  MRI BRAIN WO CONTRAST   Preliminary Result Diffuse cerebral atrophy with abnormal signal involving the basal ganglia,   cerebellum and cerebral cortex consistent with diffuse anoxic injury. Herniation of the cerebellar tonsils through the foramen magnum consistent   with downward herniation. Opacification of the sinuses and mastoid air cells. Fluid in the nasopharynx   consistent with decreased state of consciousness. XR CHEST PORTABLE   Final Result   Bilateral airspace disease, increased compared to prior         XR CHEST PORTABLE   Final Result   Bilateral airspace opacities right greater than left could represent diffuse   pulmonary edema or infection         CT CHEST PULMONARY EMBOLISM W CONTRAST   Final Result   1. No evidence of pulmonary embolism. 2.  Right mainstem intubation, for which retraction 3 cm is recommended. 3.  Enteric tube terminates in the stomach. 4.  Interstitial edema with small right effusion and trace left effusion. Bilateral airspace disease may represent a component of alveolar edema as   well as aspiration pneumonitis. CT HEAD WO CONTRAST   Final Result   No acute intracranial abnormality.                      Electronically signed by Jayesh Grullon MD on 11/26/2022 at 9:40 AM

## 2022-11-26 NOTE — PROGRESS NOTES
Discussed with Dr. Hamilton. Due to catastrophic brain injury and no decision makers around we have concluded this is not survivable and she will be made medical DNR.   Brain death exam in 24-48 hours if son is not able to arrive (he is in custodial)    DO MASSIEL Cox [place] [facility], Sleep and [facility] [phone]

## 2022-11-26 NOTE — PLAN OF CARE
Problem: Discharge Planning  Goal: Discharge to home or other facility with appropriate resources  Outcome: Progressing  Flowsheets  Taken 11/26/2022 1858 by Duane March, RN  Discharge to home or other facility with appropriate resources: Identify barriers to discharge with patient and caregiver  Taken 11/26/2022 1202 by Duane March, RN  Discharge to home or other facility with appropriate resources: Refer to discharge planning if patient needs post-hospital services based on physician order or complex needs related to functional status, cognitive ability or social support system     Problem: Skin/Tissue Integrity  Goal: Absence of new skin breakdown  Outcome: Progressing     Problem: Safety - Adult  Goal: Free from fall injury  Outcome: Progressing

## 2022-11-26 NOTE — PROGRESS NOTES
MD Jazzy Downs notified of blood sugar of 356. Lantus due to start this evening. MD Jazzy Downs stated earlier to give lantus at night and not earlier in day.

## 2022-11-26 NOTE — PROGRESS NOTES
Patient Aunt reports that patient does have an adult son who would likely be next of kin legally. Patient family members deny any knowledge of alternate POA. Son is incarcerated in [address] [place] [address] should he need to be reached for decision making purposes. Son is unaware of situation at this time.

## 2022-11-27 NOTE — PLAN OF CARE
Problem: Skin/Tissue Integrity  Goal: Absence of new skin breakdown  Description: 1. Monitor for areas of redness and/or skin breakdown  2.   Assess vascular access sites hourly  Outcome: Progressing     Problem: Safety - Adult  Goal: Free from fall injury  Outcome: Progressing  Flowsheets  Free From Fall Injury: Instruct family/caregiver on patient safety    Problem: Discharge Planning  Goal: Discharge to home or other facility with appropriate resources  Outcome: Not Progressing

## 2022-11-27 NOTE — PROGRESS NOTES
4 Eyes Skin Assessment     NAME:  Mikhail Greer  YOB: 1975  MEDICAL RECORD NUMBER:  [id number]    The patient is being assessed for  Shift Handoff    I agree that One RN have performed a thorough Head to Toe Skin Assessment on the patient. ALL assessment sites listed below have been assessed. Areas assessed by both nurses:    Head, Face, Ears, Shoulders, Back, Chest, Arms, Elbows, Hands, Sacrum. Buttock, Coccyx, Ischium, and Legs. Feet and Heels        Does the Patient have a Wound?  No noted wound(s)       [place] Prevention initiated by RN: Yes   Wound Care Orders initiated by RN: No    Pressure Injury (Stage 3,4, Unstageable, DTI, NWPT, and Complex wounds) if present place referral order by RN under : No    New and Established Ostomies, if present place, referral order under : No      Nurse 1 eSignature: Electronically signed by Thaddeus Watkins RN on 11/27/22 at 6:39 AM EST    **SHARE this note so that the co-signing nurse is able to place an eSignature**    Nurse 2 eSignature: {Esignature:[id number]}

## 2022-11-27 NOTE — PROGRESS NOTES
Nephrology ([facility]) Progress Note    CC: hypernatremia    Subjective:    HPI:  Ventilated. No new labs. ROS:  In bed. [address]:  medications reviewed. Family present. Objective:  Blood pressure (!) 104/59, pulse 86, temperature 97.6 °F (36.4 °C), temperature source Oral, resp. rate 22, height 5' 3\" (1.6 m), weight 209 lb 7 oz (95 kg), SpO2 96 %. Intake/Output Summary (Last 24 hours) at 11/27/2022 1446  Last data filed at 11/27/2022 1157  Gross per 24 hour   Intake 7377.93 ml   Output 6300 ml   Net 1077.93 ml       General:  NAD, ventilated  Chest:  coarse BS  CVS:  RRR  Abdominal:  NTND, soft, +BS  Extremities:  no edema  Skin:  no rash    Labs:  Renal panel:  Lab Results   Component Value Date/Time     (H) 11/26/2022 05:12 AM    K 3.8 11/26/2022 05:12 AM    K 3.5 11/19/2022 05:01 AM    CO2 26 11/26/2022 05:12 AM    BUN 23 (H) 11/26/2022 05:12 AM    CREATININE <0.5 (L) 11/26/2022 05:12 AM    CALCIUM 7.4 (L) 11/26/2022 05:12 AM    PHOS 2.8 11/26/2022 05:12 AM    MG 2.20 11/26/2022 05:12 AM     CBC:  Lab Results   Component Value Date/Time    WBC 8.9 11/27/2022 05:00 AM    HGB 8.5 (L) 11/27/2022 05:00 AM    HCT 27.4 (L) 11/27/2022 05:00 AM    PLT 55 (L) 11/27/2022 05:00 AM       Assessment/Plan:  Reviewed old records and labs. 1) hypernatremia              - free water deficit 6.8 liters + 1/2 UO              - increase D5W to 200 ml/hr              - continue free water flushes, but it will not correct free water deficit   - needs labs to determine management of IVF     2) ARDS              - per pulmonary              - if fluid removal is desired, ultrafiltration may be preferable as we would be able to remove isotonic fluid from the patient, unlike with diuretic that she had diuresis > natriuresis.        3) ID              - tamiflu finished              - on empiric cefepime and flagyl              - blood cultures NGTD     4) septic shock              - off levophed gtt              - on solumedrol     5) seizure disorder              - on keppra     6) FEN              - acid/base                          - permissive hypercapnia              - hyperphosphatemia                          - monitor   - keep trophic TF at minimum rate as it will exacerbate hypernatremia   - hyperglycemia    - may need insulin gtt    7) anoxic brain injury   - DNR:CCA   - brain death exam tomorrow     critical care:  30+ min

## 2022-11-27 NOTE — PLAN OF CARE
Problem: Discharge Planning  Goal: Discharge to home or other facility with appropriate resources  11/27/2022 0126 by Brianna Serrano RN  Outcome: Not Progressing  11/26/2022 1858 by Gab Mann RN  Outcome: Progressing  Flowsheets  Taken 11/26/2022 1858 by Gab Mann RN  Discharge to home or other facility with appropriate resources: Identify barriers to discharge with patient and caregiver  Taken 11/26/2022 1202 by Gab Mann RN  Discharge to home or other facility with appropriate resources: Refer to discharge planning if patient needs post-hospital services based on physician order or complex needs related to functional status, cognitive ability or social support system  Taken 11/26/2022 0815 by Rodger Andrews RN  Discharge to home or other facility with appropriate resources: Identify barriers to discharge with patient and caregiver     Problem: Nutrition Deficit:  Goal: Optimize nutritional status  Outcome: Progressing     Problem: Pain  Goal: Verbalizes/displays adequate comfort level or baseline comfort level  Outcome: Progressing     Problem: Skin/Tissue Integrity  Goal: Absence of new skin breakdown  Description: 1. Monitor for areas of redness and/or skin breakdown  2. Assess vascular access sites hourly    11/27/2022 0126 by Brianna Serrano RN  Outcome: Progressing  11/26/2022 1858 by Gab Mann RN  Outcome: Progressing     Problem: ABCDS Injury Assessment  Goal: Absence of physical injury  Outcome: Progressing     Problem: Safety - Adult  Goal: Free from fall injury  11/27/2022 0126 by Brianna Serrano RN  Outcome: Progressing  11/26/2022 1858 by Gab Mann RN  Outcome: Progressing     Problem: Safety - Violent/Self-destructive Restraint  Goal: Remains free of injury from restraints (Restraint for Violent/Self-Destructive Behavior)  Description: INTERVENTIONS:  1.  Determine that de-escalation and other, less restrictive measures have been tried or would not be effective before applying the restraint  2. Identify and document the criteria for restraint  3. Evaluate the patient's condition at the time of restraint application  4. Inform patient/family regarding the reason for restraint/seclusion  5. Q2H: Monitor comfort, nutrition and hydration needs  6. Q15M: Perform safety checks including skin, circulation, sensory, respiratory and psychological status  7. Ensure continuous observation  8. Identify and implement measures to help patient regain control, assess readiness for release and initiate progressive release per policy  Outcome: Completed     Problem: Safety - Medical Restraint  Goal: Remains free of injury from restraints (Restraint for Interference with Medical Device)  Description: INTERVENTIONS:  1. Determine that other, less restrictive measures have been tried or would not be effective before applying the restraint  2. Evaluate the patient's condition at the time of restraint application  3. Inform patient/family regarding the reason for restraint  4.  Q2H: Monitor safety, psychosocial status, comfort, nutrition and hydration  Outcome: Completed     Problem: Discharge Planning  Goal: Discharge to home or other facility with appropriate resources  11/27/2022 0126 by Stephan Ortiz RN  Outcome: Not Progressing  11/26/2022 1858 by Antoinette Monique RN  Outcome: Progressing  Flowsheets  Taken 11/26/2022 1858 by Antoinette Monique RN  Discharge to home or other facility with appropriate resources: Identify barriers to discharge with patient and caregiver  Taken 11/26/2022 1202 by Antoinette Monique RN  Discharge to home or other facility with appropriate resources: Refer to discharge planning if patient needs post-hospital services based on physician order or complex needs related to functional status, cognitive ability or social support system  Taken 11/26/2022 0815 by Herminia Del Angel RN  Discharge to home or other facility with appropriate resources: Identify barriers to discharge with patient and caregiver

## 2022-11-27 NOTE — PROGRESS NOTES
Call placed to [facility]. She confirmed she was updated 11/26 by dr Alfredo Nash. Notified [facility] that it the brain assessment is anticipated 11/28 morning  and it is suggested family to visit. [facility] states she is out right now but to please call Morgan or Karthik ([place]). Chung Wyatt Brother/Sister  Attach       Legal Guardian?:      Primary Phone: [phone] Sarmad Son)     Home Phone: [phone]             Attempted call to contact Morgan, no answer, voicemail left     [address] [facility] Other Relative  Attach Yes      Legal Guardian?:      Primary Phone: [phone] (H)     Home Phone: [phone]     Mobile Phone:      Preferred             Call placed to Carolyn, spoke with her. She confirmed she was present for the converation 11/26 with Dr Ty Lock.   She statese that family knows to come up to see the patient today  Dyan Navarro Other  Attach No      Legal Guardian?: No     Primary Phone: [phone] (M)     Home Phone:      Mobile Phone: [phone]

## 2022-11-27 NOTE — PROGRESS NOTES
11/26/22 2018   ETT    Placement Date/Time: 11/19/22 0601   Present on Admission/Arrival: No  Placed By: In ED  Placement Verified By: Auscultation; Chest X-ray;Colorimetric ETCO2 device  Preoxygenation: Yes  Mask Ventilation: Ventilated by mask (1)  Technique: Video laryngo. .. Secured At 23 cm   Measured From Lips   ETT Placement (S)  Right   Secured By Commercial tube alicia   Site Assessment Drainage (comment); Skin discolored (comment)  (Martin drainage from mouth, L corner of mouth has small wound. Tongue was compressed against ET tube causing a large indentation.)   Cuff Pressure 30 cm H2O   Tie/Alicia Changed No     Drainage from pt mouth and face has been cleansed and tongue has been repositoned to lay flat in the mouth.

## 2022-11-27 NOTE — PROGRESS NOTES
Hospitalist Progress Note    Patient:  Russ Jefferson  Unit/Bed:[id number]-66242111/2111-01   YOB: 1975       MRN: [id number] Acct: [de-identified]  PCP: FARHAN Marquez CNP    Date of Admission: 11/19/2022  --------------------------    Chief Complaint:      Cardiac arrest    Interval history    Russ Jefferson is a 52 y.o. female hospitalized on 11/19/2022   after cardiac arrest.  Patient called EMS for flulike symptoms. She developed altered mental status while on the ambulance. In the ED she underwent CPR with ROSC achieved approximately 10 minutes. Initial rhythm PEA. MRI Brain on 11/25 showed diffuse anoxic brain injury and herniation. There has been no clinical improvement and patient remains unresponsive, completely off sedation now. Assessment/plan:      Cardiopulmonary arrest with ROSC approximately after 10 minutes of resuscitation. Initial rhythm was PEA. Influenza A  Severe acute hypoxic respiratory failure/ARDS CTA of the chest showed no pulmonary embolism  Shock, cardiogenic and septic  Anoxic encephalopathy - MRI Brain without contrast showed diffuse anoxic brain injury and herniation. EEG was consistent with anoxic brain injury  Myoclonic jerks/seizure ? Prior history of seizures. EEG was consistent with anoxic brain injury. No evidence of seizure activity. Pulmonary edema, echocardiogram reviewed. Normal EF. Hypernatremia - nephrology managing with D5W. Aspiration pneumonia  Hyperglycemia ?   ,  A1c 6. Nonzero troponinemia, multifactorial  Obesity      PLAN:  Neuro:  -Rewarming process completed 11/20/2022  -Neurology input appreciated, EEG after removal of paralytics  -Stop all sedation  -Palliative care consulted and currently patient remain full code. Per notes the patient has mentation over 15years old.  -patient has diffuse anoxic brain injury and herniation evident on MRI Brain scan.  Her prognosis is terminal and recommend withdrawal of care  -EEG was consistent with anoxic brain injury. Pulm:  -Remain intubated  -Stopped all sedation  -Continue broad broad-spectrum antibiotics and completed Tamiflu    CVS:  -Echocardiogram reviewed, normal systolic function,   -Continue vasopressor support as required. Renal:  Creatinine within reasonable range, continue monitoring, monitor urine output. Monitor and replete electrolyte. Hypernatremia: Continue IV fluids. We will change to dextrose. Nephrology consulted. GI:  -GI prophylaxis. ID:  -On cefepime and completed Tamiflu. Procalcitonin improving    Endocrine:  -Off insulin drip and currently on high dose sliding scale insulin    Hem/Onc:  -Monitor CBC       Skin: Continue skin care     Code Status: DNR-CCA     DVT prophylaxis: Lovenox (on hold due to thrombocytopenia)     Disposition: Continue care ICU. Prognosis terminal. She has diffuse anoxic brain injury and herniation evidence on her MRI scan. Anoxic brain injury evidence on EEG. Spoke to several family members on 11/26 including sister and spoke with son over the phone who is currently incarcerated. The son is the next of kin and decision maker. Updated all about MRI findings and clinical exam. Explained the condition of anoxic brain injury. Explained that patient will not recover neurologically from this. Explained that an official brain death exam will occur in the next 1-2 days (likely tomorrow). The son stated that he wanted us to do everything to \"save his mother's life\" despite what I explained above. Discussed with Dr. Gricelda Carrington and brain death exam tentatively planned for tomorrow. Family is planning on visiting. Nursing is reaching out to see if they can come today. After brain death exam, will attempt to arrange a phone call with the son with the results. In the meantime, Dr. Gricelda Carrington and I have made the patient a medical DNR given futile terminal clinical status. Nutrition. Continue G-tube feeding. ----------------    Subjective:     No clinical change. Unresponsive. Diet: Diet NPO  ADULT TUBE FEEDING; Orogastric; Peptide Based; Continuous; 10; Yes; 0; Q 4 hours; 10; 300; Q 4 hours; Protein; Bolus 1 proteinex daily. OBJECTIVE     Exam:  /68   Pulse 70   Temp 97.5 °F (36.4 °C)   Resp 22   Ht 5' 3\" (1.6 m)   Wt 209 lb 7 oz (95 kg)   SpO2 97%   BMI 37.10 kg/m²        Gen: Unresponsive. Head: Normocephalic. Atraumatic. Eyes: dilated and fixed pupils. ENT: ETT and tube feeding  in place, oral feeding tube in place  CVS: RRR   Pulmomary: Intubated  Gastrointestinal: Cannot be assessed the patient is in prone position  Musculoskeletal: No edema. Warm  Neuro: Unresponsive, pupils dilated and fixed. Does not respond to painful stimuli.   Psychiatry:  unable to assess       Medications:  Reviewed    Infusion Medications    dextrose 200 mL/hr at 11/27/22 0927    norepinephrine 9 mcg/min (11/27/22 0931)    dextrose       Scheduled Medications    insulin glargine  20 Units SubCUTAneous Nightly    insulin lispro  0-16 Units SubCUTAneous Q4H    docusate  100 mg Oral Daily    artificial tears   Both Eyes 6 times per day    metroNIDAZOLE  500 mg IntraVENous Q8H    cefepime  2,000 mg IntraVENous Q8H    [Held by provider] enoxaparin  40 mg SubCUTAneous Daily    chlorhexidine  15 mL Mouth/Throat BID    lactobacillus  2 capsule Oral BID    pantoprazole  40 mg IntraVENous Daily    levETIRAcetam  1,000 mg IntraVENous Q12H     PRN Meds: sodium phosphate IVPB **OR** sodium phosphate IVPB, acetaminophen **OR** acetaminophen, ondansetron **OR** ondansetron, perflutren lipid microspheres, glucose, dextrose bolus **OR** dextrose bolus, glucagon (rDNA), dextrose      Intake/Output Summary (Last 24 hours) at 11/27/2022 0939  Last data filed at 11/27/2022 0618  Gross per 24 hour   Intake 7327.93 ml   Output 5980 ml   Net 1347.93 ml               Labs:   Recent Labs     11/25/22  0341 11/26/22  0512 11/27/22  0500   WBC 10.3 7.3 8.9   HGB 8.5* 7.3* 8.5*   HCT 27.6* 24.0* 27.4*   PLT 85* 49* 55*       Recent Labs     11/25/22  0341 11/25/22  1402 11/26/22  0512   * 157* 157*   K 3.4* 4.6 3.8   * 123* 124*   CO2 29 27 26   BUN 23* 22* 23*   CREATININE 0.7 0.6 <0.5*   CALCIUM 7.9* 7.1* 7.4*   PHOS 0.8* 3.3 2.8       No results for input(s): AST, ALT, BILIDIR, BILITOT, ALKPHOS in the last 72 hours. No results for input(s): INR in the last 72 hours. No results for input(s): Prashanth Gubler in the last 72 hours. Urinalysis:      Lab Results   Component Value Date/Time    NITRU Negative 11/19/2022 06:00 AM    WBCUA 3-5 11/19/2022 06:00 AM    BACTERIA 1+ 11/19/2022 06:00 AM    RBCUA 0-2 11/19/2022 06:00 AM    BLOODU TRACE 11/19/2022 06:00 AM    SPECGRAV 1.045 11/19/2022 06:00 AM    GLUCOSEU Negative 11/19/2022 06:00 AM       Radiology:  MRI BRAIN WO CONTRAST   Preliminary Result   Diffuse cerebral atrophy with abnormal signal involving the basal ganglia,   cerebellum and cerebral cortex consistent with diffuse anoxic injury. Herniation of the cerebellar tonsils through the foramen magnum consistent   with downward herniation. Opacification of the sinuses and mastoid air cells. Fluid in the nasopharynx   consistent with decreased state of consciousness. XR CHEST PORTABLE   Final Result   Bilateral airspace disease, increased compared to prior         XR CHEST PORTABLE   Final Result   Bilateral airspace opacities right greater than left could represent diffuse   pulmonary edema or infection         CT CHEST PULMONARY EMBOLISM W CONTRAST   Final Result   1. No evidence of pulmonary embolism. 2.  Right mainstem intubation, for which retraction 3 cm is recommended. 3.  Enteric tube terminates in the stomach. 4.  Interstitial edema with small right effusion and trace left effusion.    Bilateral airspace disease may represent a component of alveolar edema as   well as aspiration pneumonitis. CT HEAD WO CONTRAST   Final Result   No acute intracranial abnormality.                      Electronically signed by Julius Matthews MD on 11/27/2022 at 9:39 AM

## 2022-11-27 NOTE — PROGRESS NOTES
Pulmonary Progress Note    CC:  Follow up cardiac arrest, ARDS, influenza    Subjective:  She was made medical DNR after discussing with Dr. Sibley [place] at 90%, PEEP at 14  On pressors  Blood sugar remains elevated  Sodium still elevated     Intake/Output Summary (Last 24 hours) at 11/27/2022 0824  Last data filed at 11/27/2022 0618  Gross per 24 hour   Intake 7327.93 ml   Output 5980 ml   Net 1347.93 ml         PHYSICAL EXAM:  Blood pressure 119/63, pulse 72, temperature 97.5 °F (36.4 °C), resp. rate 22, height 5' 3\" (1.6 m), weight 209 lb 7 oz (95 kg), SpO2 94 %.'  Gen: Comatose   Eyes: Eyes fixed and dilated   ENT: No discharge. Pharynx with ETT and gastric tube  Neck: Trachea with ETT and NG    Resp: Rhonchi without any coughing or spontaneous breaths   CV: Tachycardic. +murmur   GI: Soft, diminished BS  Skin: Edematous   Lymph: No cervical LAD. No supraclavicular LAD. M/S: No cyanosis. No clubbing. No joint deformity.     Neuro: No response to stimuli, pupils fixed and dilated, no cough   Ext:   mild edema    Medications:    Scheduled Meds:   insulin glargine  20 Units SubCUTAneous Nightly    insulin lispro  0-16 Units SubCUTAneous Q4H    docusate  100 mg Oral Daily    artificial tears   Both Eyes 6 times per day    metroNIDAZOLE  500 mg IntraVENous Q8H    cefepime  2,000 mg IntraVENous Q8H    enoxaparin  40 mg SubCUTAneous Daily    chlorhexidine  15 mL Mouth/Throat BID    lactobacillus  2 capsule Oral BID    pantoprazole  40 mg IntraVENous Daily    levETIRAcetam  1,000 mg IntraVENous Q12H       Continuous Infusions:   dextrose 200 mL/hr at 11/27/22 0618    norepinephrine 7 mcg/min (11/27/22 0618)    dextrose         PRN Meds:  sodium phosphate IVPB **OR** sodium phosphate IVPB, acetaminophen **OR** acetaminophen, ondansetron **OR** ondansetron, perflutren lipid microspheres, glucose, dextrose bolus **OR** dextrose bolus, glucagon (rDNA), dextrose    Labs:  CBC:   Recent Labs     11/25/22  6591 22  0512 22  0500   WBC 10.3 7.3 8.9   HGB 8.5* 7.3* 8.5*   HCT 27.6* 24.0* 27.4*   MCV 77.2* 78.6* 77.5*   PLT 85* 49* 55*     BMP:   Recent Labs     22  0341 22  1402 22  0512   * 157* 157*   K 3.4* 4.6 3.8   * 123* 124*   CO2 29 27 26   PHOS 0.8* 3.3 2.8   BUN 23* 22* 23*   CREATININE 0.7 0.6 <0.5*     LIVER PROFILE:   No results for input(s): AST, ALT, LIPASE, BILIDIR, BILITOT, ALKPHOS in the last 72 hours. Invalid input(s): AMYLASE,  ALB      PT/INR:   No results for input(s): PROTIME, INR in the last 72 hours. APTT:   No results for input(s): APTT in the last 72 hours. UA:  No results for input(s): NITRITE, COLORU, PHUR, LABCAST, WBCUA, RBCUA, MUCUS, TRICHOMONAS, YEAST, BACTERIA, CLARITYU, SPECGRAV, LEUKOCYTESUR, UROBILINOGEN, BILIRUBINUR, BLOODU, GLUCOSEU, AMORPHOUS in the last 72 hours. Invalid input(s): Jennifer Diver    No results for input(s): PH, PCO2, PO2 in the last 72 hours. Films:  Chest imaging reports were reviewed and imaging was reviewed by me and showed ETT and gastric tube in place. Diffuse airspace disease bilaterally     AB.38/54/88    Cultures:  Sputum;  Normal wilma   Blood:  NGTD    I reviewed the labs and images listed above    Assessment/Plan:   Cardiopulmonary Arrest PEA  Full vent. Decrease PEEP to 13 due to plateau pressures >04  Daily ABG  Shock, likely cardiogenic and septic   Levophed for MAP of 65  Completed cefepime and flagyl empirically   Cultures NGTD  ARDS  Vent protection strategy   Influenza A  Tamiflu completed   Solumedrol completed   Anoxic Brain injury with herniation  Terminal condition   Needs withdrawal of care  Hypernatremia,  On hypotonic saline per Nephrology   MEDICAL DNR  Trying to see if son can visit (he is in alf)  Brain death exam soon if he is unable to visit. Has mental capacity of a 15year old per family     Insulin control.   Increase lantus and solumedrol discontinued   Enteral feeding  Bowel regimen     GI prophylaxis  Protonix  DVT prophylaxis  Lovenox     Terminal condition. If family elects to not withdraw care then brain death exam.  I am not sure if they are aware of where we are at. I would be more than willing to have them visit before brain death exam but we need a plan for brain death exam if family will not stop care    Made MEDICAL DNR on 11/26    Critical care time of 31 minutes. This does not include procedural time. Please see procedural notes for details.     Destinee Stanford, DO [facility]

## 2022-11-27 NOTE — PROGRESS NOTES
Nereida Dickerson is being seen in follow up for CEREBRAL ANOXIA. H/O INFLUENZA. Currently DNR  Currently on low dose Levophed. Per RN no seizure activity.   No improvement in neurological exam.    MEDICATIONS: Current Facility-Administered Medications: insulin glargine (LANTUS) injection vial 20 Units, 20 Units, SubCUTAneous, Nightly  insulin lispro (HUMALOG) injection vial 0-16 Units, 0-16 Units, SubCUTAneous, Q4H  sodium phosphate 14.97 mmol in sodium chloride 0.9 % 250 mL IVPB, 0.16 mmol/kg, IntraVENous, PRN **OR** sodium phosphate 29.94 mmol in sodium chloride 0.9 % 250 mL IVPB, 0.32 mmol/kg, IntraVENous, PRN  docusate (COLACE) 50 MG/5ML liquid 100 mg, 100 mg, Oral, Daily  dextrose 5 % solution, , IntraVENous, Continuous  acetaminophen (TYLENOL) tablet 650 mg, 650 mg, Oral, Q4H PRN **OR** acetaminophen (TYLENOL) suppository 650 mg, 650 mg, Rectal, Q4H PRN  lubrifresh P.M. (artificial tears) ophthalmic ointment, , Both Eyes, 6 times per day  metronidazole (FLAGYL) 500 mg in 0.9% NaCl 100 mL IVPB premix, 500 mg, IntraVENous, Q8H  [COMPLETED] cefepime (MAXIPIME) 2000 mg IVPB minibag, 2,000 mg, IntraVENous, Once **FOLLOWED BY** cefepime (MAXIPIME) 2000 mg IVPB minibag, 2,000 mg, IntraVENous, Q8H  norepinephrine (LEVOPHED) 16 mg in dextrose 5% 250 mL infusion, 1-50 mcg/min, IntraVENous, Continuous  [Held by provider] enoxaparin (LOVENOX) injection 40 mg, 40 mg, SubCUTAneous, Daily  ondansetron (ZOFRAN-ODT) disintegrating tablet 4 mg, 4 mg, Oral, Q8H PRN **OR** ondansetron (ZOFRAN) injection 4 mg, 4 mg, IntraVENous, Q6H PRN  chlorhexidine (PERIDEX) 0.12 % solution 15 mL, 15 mL, Mouth/Throat, BID  perflutren lipid microspheres (DEFINITY) injection 1.5 mL, 1.5 mL, IntraVENous, ONCE PRN  glucose chewable tablet 16 g, 4 tablet, Oral, PRN  dextrose bolus 10% 125 mL, 125 mL, IntraVENous, PRN **OR** dextrose bolus 10% 250 mL, 250 mL, IntraVENous, PRN  glucagon (rDNA) injection 1 mg, 1 mg, SubCUTAneous, PRN  dextrose 10 % infusion, , IntraVENous, Continuous PRN  lactobacillus (CULTURELLE) capsule 2 capsule, 2 capsule, Oral, BID  pantoprazole (PROTONIX) injection 40 mg, 40 mg, IntraVENous, Daily  levETIRAcetam (KEPPRA) 1000 mg/100 mL IVPB, 1,000 mg, IntraVENous, Q12H     Constitutional    Vital signs: BP, HR, and RR reviewed--TEMP TIME OF EXAM 98. 1.  she is not overbreathing the vent   General INTUBATED. WARMING BLANKET. NO SEDATIVES. Neurologic  Mental status: coma  Cranial nerves:   CN2: Visual Fields--no blink to threat,   CN 3,4,6: pupils 5mm and fixed bilaterally.,absent Doll's eyes  Strength: No withdrawal to stimulation and no spontaneous movement in all 4 limbs  Sensory: no grimace or withdrawal to noxious stimulation in all 4 extremities.     Gait: not able   LABS  CBC with Differential:    Lab Results   Component Value Date/Time    WBC 8.9 11/27/2022 05:00 AM    RBC 3.54 11/27/2022 05:00 AM    HGB 8.5 11/27/2022 05:00 AM    HCT 27.4 11/27/2022 05:00 AM    PLT 55 11/27/2022 05:00 AM    MCV 77.5 11/27/2022 05:00 AM    MCH 24.0 11/27/2022 05:00 AM    MCHC 30.9 11/27/2022 05:00 AM    RDW 16.3 11/27/2022 05:00 AM    NRBC 1 11/26/2022 05:12 AM    BANDSPCT 6 11/26/2022 05:12 AM    METASPCT 1 11/23/2022 04:40 AM    LYMPHOPCT 14.1 11/27/2022 05:00 AM    PROMYELOPCT 2 11/24/2022 04:24 AM    MONOPCT 2.9 11/27/2022 05:00 AM    MYELOPCT 1 11/24/2022 04:24 AM    BASOPCT 0.1 11/27/2022 05:00 AM    MONOSABS 0.3 11/27/2022 05:00 AM    LYMPHSABS 1.3 11/27/2022 05:00 AM    EOSABS 0.2 11/27/2022 05:00 AM    BASOSABS 0.0 11/27/2022 05:00 AM     CMP:    Lab Results   Component Value Date/Time     11/26/2022 05:12 AM    K 3.8 11/26/2022 05:12 AM    K 3.5 11/19/2022 05:01 AM     11/26/2022 05:12 AM    CO2 26 11/26/2022 05:12 AM    BUN 23 11/26/2022 05:12 AM    CREATININE <0.5 11/26/2022 05:12 AM    AGRATIO 1.2 11/24/2022 04:24 AM    LABGLOM >60 11/26/2022 05:12 AM    GLUCOSE 275 11/26/2022 05:12 AM    PROT 5.5 11/24/2022 04:24 AM LABALBU 3.0 11/24/2022 04:24 AM    CALCIUM 7.4 11/26/2022 05:12 AM    BILITOT <0.2 11/24/2022 04:24 AM    ALKPHOS 79 11/24/2022 04:24 AM    AST 34 11/24/2022 04:24 AM    ALT 23 11/24/2022 04:24 AM       IMAGING AND OTHER REPORTS:       MRI BRAIN WO CONTRAST (Preliminary)--11/25/2022--  This result has not been signed. Information might be incomplete. Impression  Diffuse cerebral atrophy with abnormal signal involving the basal ganglia,  cerebellum and cerebral cortex consistent with diffuse anoxic injury. Herniation of the cerebellar tonsils through the foramen magnum consistent  with downward herniation. Opacification of the sinuses and mastoid air cells. Fluid in the nasopharynx  consistent with decreased state of consciousness. IMPRESSION/PLAN: CURRENTLY BRAIN DEAD--complete absence of brain stem reflexes- based on neurological exam today--secondary to PEA, cardiopulmonary arrest with ROSC after 10min also with severe ARDS WITH CARDIOGENIC SHOCK. POSSIBLE seizure with myoclonic jerks--none seen recently. Currently on Keppra  Overall poor prognosis for survival  Per RN-meeting with son is being planned for tomorrow. They are waiting on OK from officials at the [facility] where he is incarcerated.      Electronically signed by Sam Vincent MD on 11/27/2022 at 2:45 PM

## 2022-11-27 NOTE — CARE COORDINATION
Spoke with [facility] SOURAV BIRD alerting me to the need of a conference call with physicians and patient's son/primary decision-maker to discuss patient's prognosis on 11/27/2022 at 2pm.    Call placed to the [facility]160 at [phone]. Spoke with Haroon Alvarenga who was unable to arrange the phone conversation as he stated \"it would have to be approved through the warden's office. \" Haroon Alvarenga instructed me to call back tomorrow morning to get the phone call approved after the warden's office opens at 8 am.     Will re-attempt tomorrow as instructed.     Electronically signed by Alexey Holguin RN on 11/27/2022 at 12:11 PM

## 2022-11-28 NOTE — CARE COORDINATION
[facility]  Palliative Care   Progress Note    NAME:  Ruma Bernal  MEDICAL RECORD NUMBER:  [id number]  AGE: 52 y.o. GENDER: female  : 1975  TODAY'S DATE:  2022    Subjective: Patient remains on vent and pressors, no response to pain or stimuli. Objective:    Vitals:    22 1000   BP:    Pulse: 97   Resp: 20   Temp:    SpO2: 100%     Lab Results   Component Value Date    WBC 11.0 2022    HGB 7.9 (L) 2022    HCT 26.3 (L) 2022    MCV 77.6 (L) 2022    PLT 46 (L) 2022     Lab Results   Component Value Date    CREATININE 0.6 2022    BUN 15 2022     (H) 2022    K 3.9 2022     (H) 2022    CO2 30 2022     Lab Results   Component Value Date    ALT 23 2022    AST 34 2022    ALKPHOS 79 2022    BILITOT <0.2 2022       Plan: 0176 informed her sister Paula Wilkinson of plan to do official brain death exam at 15 noon, she requested I call her cousins. I left message for Jung Colon and spoke with Iván she will try to be here. [address] with son Priyanka Gage on speaker phone, sister Paula Wilkinson and [place] cousin present, Dr Verner Quinones, Imelda Baldwin Director of [place]/Ethics, Jd ageein, [facility] representative and myself to discuss current situation. We have explained reason brain death exam could not be completed and plan on scan tomorrow. All questions were asked and answered. Plan tomorrow is to do scan and call son on video/zoom and let him know results. Her family will be here tomorrow during call. Code Status: DNR-CCA  Discharge Environment:  [] Hospice Consult Agency:  [] Inpatient Hospice    [] Home with [address]   [] ECF with Hospice  [] ECF skilled care with Hospice to follow   [] Other:    Teaching Time:  1hours  30 min     I will continue to follow Ms. Mcnamara's care as needed.       Thank you for allowing me to participate in the care of Ms. Annalisa Spicer .      Electronically signed by Suzie Peguero RN, BSN,CHPN on 11/28/2022 at 10:31 AM  Palliative Care Nurse [facility]  Office: [phone]

## 2022-11-28 NOTE — PROGRESS NOTES
Due to patient's clinical condition, it is imminent that a conversation take place with the patient's son today to update him. We are planning on doing a formal brain death exam, and he will need to be informed of the results as the next of kin and legal decision maker.     Barbara Winter MD, MPH  Hospitalist  Pager: [phone]

## 2022-11-28 NOTE — PROGRESS NOTES
Nephrology ([facility]) Progress Note    CC: hypernatremia    Subjective:    HPI:  Ventilated. Labs noted . ROS:  In bed. [address]:  medications reviewed. Family present. Objective:  Blood pressure 116/63, pulse 97, temperature 99 °F (37.2 °C), temperature source Axillary, resp. rate 20, height 5' 3\" (1.6 m), weight 211 lb 10.3 oz (96 kg), SpO2 100 %. Intake/Output Summary (Last 24 hours) at 11/28/2022 1141  Last data filed at 11/28/2022 1100  Gross per 24 hour   Intake 7504.34 ml   Output 7700 ml   Net -195.66 ml       General:  NAD, ventilated  Chest:  coarse BS  CVS:  RRR  Abdominal:  NTND, soft, +BS  Extremities:  no edema  Skin:  no rash    Labs:  Renal panel:  Lab Results   Component Value Date/Time     (H) 11/28/2022 04:46 AM    K 3.9 11/28/2022 04:46 AM    K 3.5 11/19/2022 05:01 AM    CO2 30 11/28/2022 04:46 AM    BUN 15 11/28/2022 04:46 AM    CREATININE 0.6 11/28/2022 04:46 AM    CALCIUM 8.6 11/28/2022 04:46 AM    PHOS 2.2 (L) 11/28/2022 04:46 AM    MG 2.00 11/28/2022 04:46 AM     CBC:  Lab Results   Component Value Date/Time    WBC 11.0 11/28/2022 04:46 AM    HGB 7.9 (L) 11/28/2022 04:46 AM    HCT 26.3 (L) 11/28/2022 04:46 AM    PLT 46 (L) 11/28/2022 04:46 AM       Assessment/Plan:  Reviewed old records and labs. 1) hypernatremia              - free water deficit 6 L               - On D5W to 200 ml/hr              - continue free water flushes, but it will not correct free water deficit   - needs labs to determine management of IVF     2) ARDS              - per pulmonary              - if fluid removal is desired, ultrafiltration may be preferable as we would be able to remove isotonic fluid from the patient, unlike with diuretic that she had diuresis > natriuresis.        3) ID              - tamiflu finished              - on empiric cefepime and flagyl              - blood cultures NGTD     4) septic shock              - off levophed gtt              - on solumedrol     5) seizure disorder              - on keppra     6) FEN              - acid/base                          - permissive hypercapnia              - hyperphosphatemia                          - monitor   - keep trophic TF at minimum rate as it will exacerbate hypernatremia   - hyperglycemia    - may need insulin gtt    7) anoxic brain injury   - DNR:CCA   - brain death exam today  . Prognosis poor . Plan dw nurse .      Tacos Ramirez MD,FACP

## 2022-11-28 NOTE — CARE COORDINATION
[facility] at [phone] [facility] office #[phone]). Son: Fallon Choudhury : [id number] located in [facility]   Inmate Number: [id number]  Spoke to [facility] they will need statement from Physician stating patient is in imminent risk of death and will need discussion with family. F to [phone]  Confirmed they will discuss with offender if he wishes to participate at 2 pm.     Most recent Physician note sent to institution via epic rightfax. JUANJO Ferguson, DOM, Social Work/Case Management   [phone]  Electronically signed by JUANJO Ferguson LSW on 2022 at 11:25 AM    Called to [facility] office #[phone], spoke to [facility] confirmed they have obtained documentation from physician via fax stating the importance of conference call with inmate. Provided direct contact information.   Electronically signed by JUANJO Ferguson LSW on 2022 at 11:45 AM

## 2022-11-28 NOTE — PLAN OF CARE
Problem: Pain  Goal: Verbalizes/displays adequate comfort level or baseline comfort level  Outcome: Progressing  Flowsheets (Taken 11/28/2022 0800 by Taylor Joy RN)  Verbalizes/displays adequate comfort level or baseline comfort level:   Assess pain using appropriate pain scale   Administer analgesics based on type and severity of pain and evaluate response   Implement non-pharmacological measures as appropriate and evaluate response   Consider cultural and social influences on pain and pain management   Notify Licensed Independent Practitioner if interventions unsuccessful or patient reports new pain     Problem: Skin/Tissue Integrity  Goal: Absence of new skin breakdown  Description: 1. Monitor for areas of redness and/or skin breakdown  2.   Assess vascular access sites hourly    Outcome: Progressing     Problem: Safety - Adult  Goal: Free from fall injury  Outcome: Progressing

## 2022-11-28 NOTE — SIGNIFICANT EVENT
Brain death examination was performed by critical care earlier today. The results of the exam are listed in the separate note. The apnea test was aborted as the patient had rapid desaturation and hypoxia. The NM brain perfusion scan is delayed until tomorrow as they will have the isotope available at that time. Discussed the results of the testing so far with the patient's sister and cousin at bedside. Afterwards, we had a conference call that included the patient's sister, cousin, palliative care RN, two chaplains, social work, and representatives from [employer] with the patient's son, who is currently incarcerated. The results of the testing were explained to the son. He was informed that the brain perfusion nuclear scan will be done tomorrow, and if that scan was consistent with brain death, she would legally be declared dead. It was explained to him that under this circumstance, the patient would not recover neurologically. He inquired whether she could be transferred to another facility, and he was informed she would not because she would be declared brain dead. A video conference call is going to be set up tomorrow to include the son in order for him to see his Mom and potentially other family members, hopefully to be arranged after the nuclear brain perfusion scan.      Kieran Salas MD, MPH  Hospitalist  Pager: [phone]

## 2022-11-28 NOTE — PROGRESS NOTES
4 Eyes Skin Assessment     NAME:  Maryellen Gorman  YOB: 1975  MEDICAL RECORD NUMBER:  [id number]    The patient is being assessed for  Shift Handoff    I agree that One RN have performed a thorough Head to Toe Skin Assessment on the patient. ALL assessment sites listed below have been assessed. Areas assessed by both nurses:    Head, Face, Ears, Shoulders, Back, Chest, Arms, Elbows, Hands, Sacrum. Buttock, Coccyx, Ischium, and Legs. Feet and Heels        Does the Patient have a Wound?  No noted wound(s)       [place] Prevention initiated by RN: Yes   Wound Care Orders initiated by RN: No    Pressure Injury (Stage 3,4, Unstageable, DTI, NWPT, and Complex wounds) if present place referral order by RN under : No    New and Established Ostomies, if present place, referral order under : No      Nurse 1 eSignature: Electronically signed by Nancy Glez RN on 11/28/22 at 5:18 AM EST    **SHARE this note so that the co-signing nurse is able to place an eSignature**    Nurse 2 eSignature: Electronically signed by Diomedes Tomlinson RN on 11/28/22 at 7:35 AM EST

## 2022-11-28 NOTE — PROGRESS NOTES
Hospitalist Progress Note    Patient:  Eric Melgar  Unit/Bed:[id number]-54952111/2111-01   YOB: 1975       MRN: [id number] Acct: [de-identified]  PCP: FARHAN Edwards CNP    Date of Admission: 11/19/2022  --------------------------    Chief Complaint:      Cardiac arrest    Interval history    Eric Melgar is a 52 y.o. female hospitalized on 11/19/2022   after cardiac arrest.  Patient called EMS for flulike symptoms. She developed altered mental status while on the ambulance. In the ED she underwent CPR with ROSC achieved approximately 10 minutes. Initial rhythm PEA. MRI Brain on 11/25 showed diffuse anoxic brain injury and herniation. There has been no clinical improvement and patient remains unresponsive, completely off sedation now. EEG was consistent with anoxic brain injury. Assessment/plan:      Cardiopulmonary arrest with ROSC approximately after 10 minutes of resuscitation. Initial rhythm was PEA. Influenza A  Severe acute hypoxic respiratory failure/ARDS CTA of the chest showed no pulmonary embolism  Shock, cardiogenic and septic  Anoxic encephalopathy - MRI Brain without contrast showed diffuse anoxic brain injury and herniation. EEG was consistent with anoxic brain injury  Myoclonic jerks/seizure ? Prior history of seizures. EEG was consistent with anoxic brain injury. No evidence of seizure activity. Pulmonary edema, echocardiogram reviewed. Normal EF. Hypernatremia - nephrology managing with D5W. Aspiration pneumonia  Hyperglycemia ?   ,  A1c 6. Nonzero troponinemia, multifactorial  Obesity      PLAN:  Neuro:  -Rewarming process completed 11/20/2022  -Neurology input appreciated, EEG after removal of paralytics  -Stop all sedation  -Palliative care consulted and currently patient remain full code. Per notes the patient has mentation over 15years old.  -patient has diffuse anoxic brain injury and herniation evident on MRI Brain scan.  Her prognosis is terminal and recommend withdrawal of care  -EEG was consistent with anoxic brain injury. -brain death exam scheduled for today. Discussed with critical care    Pulm:  -Remain intubated  -Stopped all sedation  -Continue broad broad-spectrum antibiotics and completed Tamiflu    CVS:  -Echocardiogram reviewed, normal systolic function,   -Continue vasopressor support as required. Renal:  Creatinine within reasonable range, continue monitoring, monitor urine output. Monitor and replete electrolyte. Hypernatremia: Continue IV fluids. We will change to dextrose. Nephrology consulted. GI:  -GI prophylaxis. ID:  -On cefepime and completed Tamiflu. Procalcitonin improving    Endocrine:  -Off insulin drip and currently on high dose sliding scale insulin    Hem/Onc:  -Monitor CBC       Skin: Continue skin care     Code Status: DNR-CCA     DVT prophylaxis: Lovenox (on hold due to thrombocytopenia)     Disposition: Continue care ICU. Prognosis terminal. She has diffuse anoxic brain injury and herniation evidence on her MRI scan. Anoxic brain injury evidence on EEG. Spoke to several family members on 11/26 including sister and spoke with son over the phone who is currently incarcerated. The son is the next of kin and decision maker. Updated all about MRI findings and clinical exam. Explained the condition of anoxic brain injury. Explained that patient will not recover neurologically from this. Explained that an official brain death exam will occur in the next 1-2 days (likely tomorrow). The son stated that he wanted us to do everything to \"save his mother's life\" despite what I explained above. Discussed with TOMMIE Jovel - [facility] and brain death exam tentatively planned for tomorrow. Family is planning on visiting. Nursing is reaching out to see if they can come today. After brain death exam, will attempt to arrange a phone call with the son with the results.  In the meantime, Dr. [employer] [facility] and I have made the patient a medical DNR given futile terminal clinical status. Brain death exam scheduled for today. Will attempt to contact patient's son after this is completed. Dr. Vale Allen trying to get family at bedside for the brain death exam.    Nutrition. Continue G-tube feeding.     ----------------    Subjective:     No clinical change. Unresponsive. Diet: Diet NPO  ADULT TUBE FEEDING; Orogastric; Peptide Based; Continuous; 10; Yes; 0; Q 4 hours; 10; 300; Q 4 hours; Protein; Bolus 1 proteinex daily. OBJECTIVE     Exam:  /63   Pulse 97   Temp 99 °F (37.2 °C) (Axillary)   Resp 20   Ht 5' 3\" (1.6 m)   Wt 211 lb 10.3 oz (96 kg)   SpO2 100%   BMI 37.49 kg/m²        Gen: Unresponsive. Head: Normocephalic. Atraumatic. Eyes: dilated and fixed pupils. ENT: ETT and tube feeding  in place, oral feeding tube in place  CVS: RRR   Pulmomary: Intubated  Gastrointestinal: Cannot be assessed the patient is in prone position  Musculoskeletal: No edema. Warm  Neuro: Unresponsive, pupils dilated and fixed. Does not respond to painful stimuli.   Psychiatry:  unable to assess       Medications:  Reviewed    Infusion Medications    dextrose 200 mL/hr at 11/28/22 0613    norepinephrine 4 mcg/min (11/28/22 8805)    dextrose       Scheduled Medications    insulin glargine  20 Units SubCUTAneous Nightly    insulin lispro  0-16 Units SubCUTAneous Q4H    docusate  100 mg Oral Daily    artificial tears   Both Eyes 6 times per day    cefepime  2,000 mg IntraVENous Q8H    [Held by provider] enoxaparin  40 mg SubCUTAneous Daily    chlorhexidine  15 mL Mouth/Throat BID    lactobacillus  2 capsule Oral BID    pantoprazole  40 mg IntraVENous Daily    levETIRAcetam  1,000 mg IntraVENous Q12H     PRN Meds: sodium phosphate IVPB **OR** sodium phosphate IVPB, acetaminophen **OR** acetaminophen, ondansetron **OR** ondansetron, perflutren lipid microspheres, glucose, dextrose bolus **OR** dextrose bolus, glucagon (rDNA), dextrose      Intake/Output Summary (Last 24 hours) at 11/28/2022 1016  Last data filed at 11/28/2022 0800  Gross per 24 hour   Intake 7504.34 ml   Output 6500 ml   Net 1004.34 ml               Labs:   Recent Labs     11/26/22  0512 11/27/22  0500 11/28/22  0446   WBC 7.3 8.9 11.0   HGB 7.3* 8.5* 7.9*   HCT 24.0* 27.4* 26.3*   PLT 49* 55* 46*       Recent Labs     11/25/22  1402 11/26/22  0512 11/27/22  1454 11/28/22  0446   * 157* 156* 156*   K 4.6 3.8 4.1 3.9   * 124* 118* 119*   CO2 27 26 31 30   BUN 22* 23* 20 15   CREATININE 0.6 <0.5* <0.5* 0.6   CALCIUM 7.1* 7.4* 8.5 8.6   PHOS 3.3 2.8  --  2.2*       No results for input(s): AST, ALT, BILIDIR, BILITOT, ALKPHOS in the last 72 hours. No results for input(s): INR in the last 72 hours. No results for input(s): [place] in the last 72 hours. Urinalysis:      Lab Results   Component Value Date/Time    NITRU Negative 11/19/2022 06:00 AM    WBCUA 3-5 11/19/2022 06:00 AM    BACTERIA 1+ 11/19/2022 06:00 AM    RBCUA 0-2 11/19/2022 06:00 AM    BLOODU TRACE 11/19/2022 06:00 AM    SPECGRAV 1.045 11/19/2022 06:00 AM    GLUCOSEU Negative 11/19/2022 06:00 AM       Radiology:  MRI BRAIN WO CONTRAST   Preliminary Result   Diffuse cerebral atrophy with abnormal signal involving the basal ganglia,   cerebellum and cerebral cortex consistent with diffuse anoxic injury. Herniation of the cerebellar tonsils through the foramen magnum consistent   with downward herniation. Opacification of the sinuses and mastoid air cells. Fluid in the nasopharynx   consistent with decreased state of consciousness. XR CHEST PORTABLE   Final Result   Bilateral airspace disease, increased compared to prior         XR CHEST PORTABLE   Final Result   Bilateral airspace opacities right greater than left could represent diffuse   pulmonary edema or infection         CT CHEST PULMONARY EMBOLISM W CONTRAST   Final Result   1. No evidence of pulmonary embolism.       2.  Right mainstem intubation, for which retraction 3 cm is recommended. 3.  Enteric tube terminates in the stomach. 4.  Interstitial edema with small right effusion and trace left effusion. Bilateral airspace disease may represent a component of alveolar edema as   well as aspiration pneumonitis. CT HEAD WO CONTRAST   Final Result   No acute intracranial abnormality.                      Electronically signed by Craig Monique MD on 11/28/2022 at 10:16 AM

## 2022-11-29 NOTE — PROGRESS NOTES
Pulmonary Critical Care Progress Note     Patient's name:  Javier Betancourt  Medical Record Number: [id number]  Patient's account/billing number: [de-identified]  Patient's YOB: 1975  Age: 52 y.o. Date of Admission: 11/19/2022  4:56 AM  Date of Consult: 11/29/2022      Primary Care Physician: FARHAN Campbell CNP      Code Status: DNR-CCA    Chief complaint: Cardiac arrest    Assessment and Plan     Acute respiratory with hypoxia status post intubation mechanical ventilation. Cardiac arrest  anoxic brain injury      Plan:  Pending CT brain perfusion scan to determine brain death. Palliative care on board. Overnight:  Worsening hypoxia and lung compliance. REVIEW OF SYSTEMS:  Review of Systems -   Unable to obtain patient on mechanical ventilation unresponsive        Physical Exam:    Vitals: BP (!) 110/58   Pulse 100   Temp 98.5 °F (36.9 °C) (Oral)   Resp 24   Ht 5' 3\" (1.6 m)   Wt 214 lb 11.7 oz (97.4 kg)   SpO2 99%   BMI 38.04 kg/m²     Last Body weight:   Wt Readings from Last 3 Encounters:   11/29/22 214 lb 11.7 oz (97.4 kg)       Body Mass Index : Body mass index is 38.04 kg/m². Intake and Output summary:   Intake/Output Summary (Last 24 hours) at 11/29/2022 1031  Last data filed at 11/29/2022 0553  Gross per 24 hour   Intake 6570.36 ml   Output 3392 ml   Net 3178.36 ml       Physical Examination:     Gen: Unresponsive on mechanical ventilation. Eyes: Pupils fixed and dilated  ENT: No discharge. Posterior oropharynx clear. External appearance of ears and nose normal.  Neck: Trachea midline. No mass   Resp:  diminished bilaterally   CV: Regular rate. Regular rhythm. Systolic murmur   GI: Soft, Non-tender. Non-distended. +BS  Skin: Warm, dry, w/o erythema. Lymph: No cervical or supraclavicular LAD. M/S: No cyanosis. No clubbing.     Neuro:  no brain stem reflexes, no visible seizures         Laboratory findings:-    CBC:   Recent Labs     11/29/22  0320 WBC 11.6*   HGB 7.5*   PLT 53*     BMP:    Recent Labs     11/27/22  1454 11/28/22  0446 11/29/22  0320   * 156* 149*   K 4.1 3.9 4.3   * 119* 111*   CO2 31 30 31   BUN 20 15 13   CREATININE <0.5* 0.6 0.6   GLUCOSE 290* 233* 247*     S. Calcium:  Recent Labs     11/29/22  0320   CALCIUM 7.7*     S. Ionized Calcium:No results for input(s): IONCA in the last 72 hours. S. Magnesium:  Recent Labs     11/29/22  0320   MG 1.80     S. Phosphorus:  Recent Labs     11/29/22  0320   PHOS 5.1*     S. Glucose:  Recent Labs     11/28/22  2337 11/29/22  0319 11/29/22  0800   POCGLU 236* 232* 260*           Radiology Review:  Pertinent images / reports were reviewed as a part of this visit.     Critical care time 35 minutes                    Damari Negrete MD, M.D.            11/29/2022, 10:31 AM

## 2022-11-29 NOTE — PROGRESS NOTES
Patient declared brain dead at 14:02 We need to able to talk to her son Brijesh Quiroga to discuss possible organ donation.     Electronically signed by Shanti Quiroga MD on 11/29/2022 at 4:01 PM

## 2022-11-29 NOTE — PROGRESS NOTES
Nephrology ([facility]) Progress Note    CC: hypernatremia    Subjective:    HPI:  Ventilated. Labs noted . ROS:  In bed. [address]:  medications reviewed. Objective:  Blood pressure 101/63, pulse 89, temperature 98.5 °F (36.9 °C), temperature source Oral, resp. rate 20, height 5' 3\" (1.6 m), weight 214 lb 11.7 oz (97.4 kg), SpO2 (!) 26 %. Intake/Output Summary (Last 24 hours) at 11/29/2022 1158  Last data filed at 11/29/2022 0553  Gross per 24 hour   Intake 6570.36 ml   Output 2820 ml   Net 3750.36 ml       General:  NAD, ventilated  Chest:  coarse BS  CVS:  RRR  Abdominal:  NTND, soft, +BS  Extremities:  no edema  Skin:  no rash    Labs:  Renal panel:  Lab Results   Component Value Date/Time     (H) 11/29/2022 03:20 AM    K 4.3 11/29/2022 03:20 AM    K 3.5 11/19/2022 05:01 AM    CO2 31 11/29/2022 03:20 AM    BUN 13 11/29/2022 03:20 AM    CREATININE 0.6 11/29/2022 03:20 AM    CALCIUM 7.7 (L) 11/29/2022 03:20 AM    PHOS 5.1 (H) 11/29/2022 03:20 AM    MG 1.80 11/29/2022 03:20 AM     CBC:  Lab Results   Component Value Date/Time    WBC 11.6 (H) 11/29/2022 03:20 AM    HGB 7.5 (L) 11/29/2022 03:20 AM    HCT 25.2 (L) 11/29/2022 03:20 AM    PLT 53 (L) 11/29/2022 03:20 AM       Assessment/Plan:  Reviewed old records and labs. 1) hypernatremia              - on hypotonic fluids , rate adjusted . 2) ARDS              - per pulmonary              - if fluid removal is desired, ultrafiltration may be preferable as we would be able to remove isotonic fluid from the patient, unlike with diuretic that she had diuresis > natriuresis.        3) ID              - tamiflu finished              - on empiric cefepime and flagyl              - blood cultures NGTD     4) septic shock              - off levophed gtt              - on solumedrol     5) seizure disorder              - on keppra     6) FEN              - acid/base                          - permissive hypercapnia              - hyperphosphatemia                          - monitor   - keep trophic TF at minimum rate as it will exacerbate hypernatremia   - hyperglycemia    - may need insulin gtt    7) anoxic brain injury   - DNR:CCA       Prognosis poor .      Mao Maldonado MD,FACP

## 2022-11-29 NOTE — PROGRESS NOTES
Patient declared brain dead 36 by the ICU attending using clinical data and supported by   brain scan. Allison Schwartz Appreciate palliative care team who has been discussing patient care with multiple family member including her son Johnathon Jolley (currently an inmate at correctional facility)  he refused organ donation. He would like the family to come and see her before the decision on removing the ventilator. No consent and family to proceed with vent removal at this time. Patient respiratory status has been unstable today. She remain severely hypoxic despite multiple ventilator setting changes, I expect the patient cardiac activity will  cease in matter of hours. If not will be discussed/educate her son about and inappropriateness  of continuing the ventilator . I discussed the case with the ethic committee  [place], and Tashia Buckley . They relate to me that the protocol of the patient is brain dead is to remove the ventilator. I discussed the plan of care with the ICU attending Dr. Thelma Gardner  who recommended waiting of the removing the ventilator at this time as the patient will likely not have cardiac activity shortly. I agree with weaning off Levophed at this time, no plan for escalation of care . Patient is medical DNR CCA as previously determined.      Electronically signed by Omar York MD on 11/29/2022 at 7:45 PM   .

## 2022-11-29 NOTE — PROGRESS NOTES
BRAIN DEATH EXAMINATION    Physicians Completing Exam: Marah Brenner MD  PCP: Drake Levine APRN - CNP  Attending: Kyung Will MD     Diagnosis: Cardiac arrest ([facility] [address] [I46.9]  Septicemia ([facility] [address] [A41.9]  Abnormal EKG [R94.31]  Aspiration pneumonia, unspecified aspiration pneumonia type, unspecified laterality, unspecified part of lung ([facility] [address] [J69.0]  Probable cause of coma: anoxic brain injury      1. Vital Statistics:   A. SBP greater than or equal to 100 mmHg: [x] Yes   [] No  C. Toxicology results: Invalid input(s): Izzy SALMON. Pentobarbital level: NA  E. Mydriatic drugs used:  [] Yes  [x] No  F. Neuromuscular blocking agents used:  [] Yes   [x] No  G. Any residual neuromuscular blockade:  [] Yes   [x] No    2. Cerebral responsivity:  A. Response to central painful stimulation at any three SUPRACLAVICULAR locations (i.e. supra-orbital pain, TMJ pressure, nares reflex, anterior axillary fold pressure, upper trapezius squeeze):  [] Yes   [x] No    3. Brainstem responsivity:  A. Pupillary reaction present:  [] Yes   [x] No  B. Pupil size dilated  1. Right pupil: 5 mm   2. Left pupil: 5 mm    C. Corneal reflexes present:  [] Yes   [x] No  D. Oculocephalic reflex present:  [] Yes   [x] No  E. Oculovestibular reflex present:  [] Yes   [x] No  F. Gag reflex present:  [] Yes   [x] No  G. Cough reflex present:  [] Yes   [x] No  H. Spontaneous breathing present:  [] Yes   [x] No    4. Apnea test:  Could not complete due to rapid desaturation/hypoxia, test aproted     5. Results of ancillary tests if performed (document time of study result):   Brain perfusion scan was done today and the results were as following:    Absent perfusion to the brain.   This supports a clinical diagnosis of brain   death     Date and Time of death  11/29/2022 at 14:02    Marah Brenner MD, 11/29/2022, 2:18 PM

## 2022-11-29 NOTE — PROGRESS NOTES
4 Eyes Skin Assessment     NAME:  Jay Flores  YOB: 1975  MEDICAL RECORD NUMBER:  [id number]    The patient is being assessed for  Shift Handoff    I agree that One RN have performed a thorough Head to Toe Skin Assessment on the patient. ALL assessment sites listed below have been assessed. Areas assessed by both nurses:    Head, Face, Ears, Shoulders, Back, Chest, Arms, Elbows, Hands, Sacrum. Buttock, Coccyx, Ischium, and Legs. Feet and Heels        Does the Patient have a Wound?  No noted wound(s)       Brenden Prevention initiated by RN: Yes   Wound Care Orders initiated by RN: No    Pressure Injury (Stage 3,4, Unstageable, DTI, NWPT, and Complex wounds) if present place referral order by RN under : NA    New and Established Ostomies, if present place, referral order under : NA      Nurse 1 eSignature: Electronically signed by Bronson Jean RN on 11/28/22 at 7:30 PM EST    **SHARE this note so that the co-signing nurse is able to place an eSignature**    Nurse 2 eSignature: Electronically signed by Raudel Phoenix RN on 11/28/22 at 7:49 PM EST

## 2022-11-29 NOTE — CARE COORDINATION
[facility]  Palliative Care   Progress Note    NAME:  Ammon Wilson  MEDICAL RECORD NUMBER:  [id number]  AGE: 52 y.o. GENDER: female  : 1975  TODAY'S DATE:  2022    Subjective:  Patient with low oxygen levels on vent on full support not able to oxygenate     Objective:    Vitals:    22 0827   BP:    Pulse: 100   Resp: 24   Temp:    SpO2: 99%     Lab Results   Component Value Date    WBC 11.6 (H) 2022    HGB 7.5 (L) 2022    HCT 25.2 (L) 2022    MCV 78.4 (L) 2022    PLT 53 (L) 2022     Lab Results   Component Value Date    CREATININE 0.6 2022    BUN 13 2022     (H) 2022    K 4.3 2022     (H) 2022    CO2 31 2022     Lab Results   Component Value Date    ALT 23 2022    AST 34 2022    ALKPHOS 79 2022    BILITOT <0.2 2022       Plan: I have tried to call [facility] to get video visit they require birth certificate and note from attending physician she is actively dying. Note can be sent but family does not have birth certificate. I called her cousin Iván to inform her of patients decline she will get her as soon as she can with patient's sister Eric Velasquez. 1402 patient had brain perfusion scan was done today and the results were as following:   Absent perfusion to the brain. This supports a clinical diagnosis of brain   death   Documented time of death per physician notes is 2022 at [phone] family sister Laura Kurtz, cousin Iván and cousin Ciaran Mccallum present we have told them scan confirmed brain death. Call placed to her son Avril Glez in correctional institution to inform him of above. We facilitated a phone call with [facility], son, and other family present. Family declined organ donation. They would like family to be able to see her before removing life support. At this time Dr Ashley Pérez in agreement with this plan.  I have reminded family patient is brain dead and has been unstable today. Code Status: DNR-CCA  Discharge Environment:  [] Hospice Consult Agency:  [] Inpatient Hospice    [] Home with [address]   [] ECF with Hospice  [] ECF skilled care with Hospice to follow   [] Other:    Teaching Time:  2hours  30 min     I will continue to follow Ms. Mcnamara's care as needed. Thank you for allowing me to participate in the care of Ms. Comfort Basurto .      Electronically signed by Lissa Byers RN, BSN,CHPN on 11/29/2022 at 11:16 AM  Palliative Care Nurse [facility]  Office: [phone]

## 2022-11-29 NOTE — PROGRESS NOTES
Comprehensive Nutrition Assessment    Type and Reason for Visit:  Reassess    Nutrition Recommendations/Plan:   Resume TF when able. Monitor for changes in D5, currently 200 mL/hr. Awaiting family decision. Malnutrition Assessment:  Malnutrition Status:  Insufficient data (11/19/22 1445)    Context:  Acute Illness     Findings of the 6 clinical characteristics of malnutrition:  Energy Intake:  Mild decrease in energy intake (Comment)    Nutrition Assessment:    Follow-up. Pt remains on vent. TF remains off. Pt likely brain dead and awaiting brain perfusion scan today. Awaiting family meeting, having difficulties getting in contact with family. D5 currently running @ 200 mL/hr providing 806 kcal daily. Resume TF once okay per MD.    Nutrition Related Findings:    non pitting BLE and BUE edema; Na 149 Wound Type: None       Current Nutrition Intake & Therapies:    Average Meal Intake: NPO  Average Supplements Intake: NPO  Diet NPO  ADULT TUBE FEEDING; Orogastric; Peptide Based; Continuous; 10; Yes; 0; Q 4 hours; 10; 300; Q 4 hours; Protein; Bolus 1 proteinex daily. Additional Calorie Sources:   D5 currently running @ 200 mL/hr providing 806 kcal daily. Anthropometric Measures:  Height: 5' 3\" (160 cm)  Ideal Body Weight (IBW): 115 lbs (52 kg)       Current Body Weight: 214 lb (97.1 kg), 186.1 % IBW.  Weight Source: Bed Scale  Current BMI (kg/m2): 37.9                          BMI Categories: Obese Class 2 (BMI 35.0 -39.9)    Estimated Daily Nutrient Needs:  Energy Requirements Based On: Kcal/kg  Weight Used for Energy Requirements: Current  Energy (kcal/day): 3650-1978 kcal (11-14 kcal/kg ABW)  Weight Used for Protein Requirements: Ideal  Protein (g/day): 104 gm (2 gm/kg IBW)  Method Used for Fluid Requirements: Other (Comment)  Fluid (ml/day): per MD    Nutrition Diagnosis:   Inadequate oral intake related to impaired respiratory function as evidenced by NPO or clear liquid status due to medical condition, intubation    Nutrition Interventions:   Food and/or Nutrient Delivery: Start Tube Feeding  Nutrition Education/Counseling: No recommendation at this time  Coordination of Nutrition Care: Continue to monitor while inpatient       Goals:  Previous Goal Met: Progressing toward Goal(s)  Goals: Initiate nutrition support, Tolerate nutrition support at goal rate       Nutrition Monitoring and Evaluation:   Behavioral-Environmental Outcomes: None Identified  Food/Nutrient Intake Outcomes: Enteral Nutrition Intake/Tolerance  Physical Signs/Symptoms Outcomes: Biochemical Data, Hemodynamic Status, Nutrition Focused Physical Findings, Skin, Weight    Discharge Planning:     Too soon to determine     Oleksandr Carrasco RD, LD  Contact: [phone]

## 2022-11-29 NOTE — PROGRESS NOTES
1030 Pt oxygen sat droppped into 30s  Respiratory to bedside, bagging pt, pt placed back on ventilator when sats did not respond to bagging. On respiratory's oxygen saturation monitor, pt satting 65%. Per tele monitor, 3% saturation.     1045 Dr. Walter Sutton to bedside    1046 Levo titrated up to 15 mcg/min for bp     1047 Dr. Bryn Thao to bedside  1048 Levo titrated up to 20mcg/min  1049 on respiratory monitor, 55% o2 sat    1050 EPOC ABG p02 30mmHg  1052 on respiratory monitor, 89%

## 2022-11-29 NOTE — PROGRESS NOTES
Patient hypotensive, patient at maximum dose of norepinephrine drip.  NP aware, no new orders received at this time

## 2022-11-29 NOTE — CARE COORDINATION
Poncemadhuri Boswell [address] at [phone] [facility] office #[phone]). Son: Dilcia Oakes : [id number] located in [facility]   Inmate Number: [id number]    Spoke to [facility] in the [employer] office. They will need a signed document from physician stating the urgency of the meeting. And will need copy of son's birth certificate to verify lineage and confirm patient is inmate's mother. Obtained physician note with signature. Called to [facility] she does not have patient's son's birth certificate. Called to [facility] per Bertha Hammonds request, she does not have son's birth certificate, and advised I contact [employer]. Unable to obtain birth certificate today. Notified Dionne Negrete with Palliative Care.        JUNAJO Rubio, DOM, Social Work/Case Management   [phone]  Electronically signed by JUANJO Rubio, DOM on 2022 at 10:52 AM

## 2022-11-29 NOTE — PROGRESS NOTES
Hospitalist Progress Note    Patient:  Eric Melgar  Unit/Bed:W3J-7235/2111-01   YOB: 1975       MRN: [id number] Acct: [de-identified]  PCP: FARHAN Edwards CNP    Date of Admission: 11/19/2022  --------------------------    Chief Complaint:      Cardiac arrest    Interval history    Eric Melgar is a 52 y.o. female hospitalized on 11/19/2022   after cardiac arrest.  Patient called EMS for flulike symptoms. She developed altered mental status while on the ambulance. In the ED she underwent CPR with ROSC achieved approximately 10 minutes. Initial rhythm PEA. Assessment/plan:      Cardiopulmonary arrest with ROSC approximately after 10 minutes of resuscitation. Initial rhythm was PEA. Treated with hypothermia protocol    Influenza A  Severe acute hypoxic respiratory failure,/ARDS CTA of the chest showed no pulmonary embolism  Anoxic brain injury   Myoclonic jerks/seizure ? Prior history of seizures   Aspiration pneumonia  Hyperglycemia ? Nonzero troponinemia, multifactorial  Obesity Body mass index is 38.04 kg/m². PLAN    Previous note reviewed, discussed with Dr. Cony Bella, ICU team no meaningful neurological recovery, off sedation. Discussed with ICU team.  Patient here with significant hypoxemia preclude performing apnea test.  Plan for CT brain perfusion. Discussed with palliative care team.  Attempting to meeting with her son via Zoom as he is currently incarcerated today to rediscuss goal of the formal brain death examination/scan. Having said that patient found hypoxemia despite maximum respiratory support                Code Status: DNR-CCA         DVT prophylaxis: Lovenox            ----------------      Subjective:     Patient seen and examined  Persistent hypoxemia, discussed with critical care, readjusting vent setting.   Plan for brain scan to confirm brain death      Diet: Diet NPO  ADULT TUBE FEEDING; Orogastric; Peptide Based; Continuous; 10; Yes; 0; Q 4 hours; 10; 300; Q 4 hours; Protein; Bolus 1 proteinex daily. OBJECTIVE     Exam:  /63   Pulse 87   Temp 97.7 °F (36.5 °C) (Axillary)   Resp 20   Ht 5' 3\" (1.6 m)   Wt 214 lb 11.7 oz (97.4 kg)   SpO2 (!) 37%   BMI 38.04 kg/m²       Gen: Dentis nonresponsive off sedation  Head: Normocephalic. Atraumatic. ENT: ETT and tube feeding  in place, feeding tube in place, no oral ulceration. CVS: Nml S1S2, no murmur  RRR  Pulmomary: Persistent hypoxemia  Gastrointestinal: Soft, nondistended,   Musculoskeletal:   edema. Neuro: Unresponsive pupils fixated and dilated.   Psychiatry:  unable to assess          Medications:  Reviewed    Infusion Medications    dextrose 100 mL/hr at 11/29/22 1401    norepinephrine 50 mcg/min (11/29/22 1226)    dextrose       Scheduled Medications    insulin glargine  20 Units SubCUTAneous Nightly    insulin lispro  0-16 Units SubCUTAneous Q4H    docusate  100 mg Oral Daily    artificial tears   Both Eyes 6 times per day    [Held by provider] enoxaparin  40 mg SubCUTAneous Daily    chlorhexidine  15 mL Mouth/Throat BID    lactobacillus  2 capsule Oral BID    pantoprazole  40 mg IntraVENous Daily    levETIRAcetam  1,000 mg IntraVENous Q12H     PRN Meds: sodium phosphate IVPB **OR** sodium phosphate IVPB, acetaminophen **OR** acetaminophen, ondansetron **OR** ondansetron, perflutren lipid microspheres, glucose, dextrose bolus **OR** dextrose bolus, glucagon (rDNA), dextrose      Intake/Output Summary (Last 24 hours) at 11/29/2022 1408  Last data filed at 11/29/2022 1226  Gross per 24 hour   Intake 6020.43 ml   Output 2330 ml   Net 3690.43 ml             Labs:   Recent Labs     11/27/22  0500 11/28/22  0446 11/29/22  0320   WBC 8.9 11.0 11.6*   HGB 8.5* 7.9* 7.5*   HCT 27.4* 26.3* 25.2*   PLT 55* 46* 53*     Recent Labs     11/27/22  1454 11/28/22  0446 11/29/22  0320   * 156* 149*   K 4.1 3.9 4.3   * 119* 111*   CO2 31 30 31   BUN 20 15 13   CREATININE <0.5* 0.6 0. 6   CALCIUM 8.5 8.6 7.7*   PHOS  --  2.2* 5.1*     No results for input(s): AST, ALT, BILIDIR, BILITOT, ALKPHOS in the last 72 hours. No results for input(s): INR in the last 72 hours. No results for input(s): Maryellen Coombes in the last 72 hours. Urinalysis:      Lab Results   Component Value Date/Time    NITRU Negative 11/19/2022 06:00 AM    WBCUA 3-5 11/19/2022 06:00 AM    BACTERIA 1+ 11/19/2022 06:00 AM    RBCUA 0-2 11/19/2022 06:00 AM    BLOODU TRACE 11/19/2022 06:00 AM    SPECGRAV 1.045 11/19/2022 06:00 AM    GLUCOSEU Negative 11/19/2022 06:00 AM       Radiology:  NM BONE SCAN 3 PHASE   Final Result   Absent perfusion to the brain. This supports a clinical diagnosis of brain   death. Critical results were called by Dr. Urmila Rosenthal. Merlin Adie to Dr. Augie Spencer on   11/29/2022 at 14:02. MRI BRAIN WO CONTRAST   Preliminary Result   Diffuse cerebral atrophy with abnormal signal involving the basal ganglia,   cerebellum and cerebral cortex consistent with diffuse anoxic injury. Herniation of the cerebellar tonsils through the foramen magnum consistent   with downward herniation. Opacification of the sinuses and mastoid air cells. Fluid in the nasopharynx   consistent with decreased state of consciousness. XR CHEST PORTABLE   Final Result   Bilateral airspace disease, increased compared to prior         XR CHEST PORTABLE   Final Result   Bilateral airspace opacities right greater than left could represent diffuse   pulmonary edema or infection         CT CHEST PULMONARY EMBOLISM W CONTRAST   Final Result   1. No evidence of pulmonary embolism. 2.  Right mainstem intubation, for which retraction 3 cm is recommended. 3.  Enteric tube terminates in the stomach. 4.  Interstitial edema with small right effusion and trace left effusion. Bilateral airspace disease may represent a component of alveolar edema as   well as aspiration pneumonitis.          CT HEAD WO CONTRAST   Final Result   No acute intracranial abnormality.                      Electronically signed by Violet Gordon MD on 11/29/2022 at 2:08 PM

## 2022-11-30 ENCOUNTER — TELEPHONE (OUTPATIENT)
Dept: SOCIAL WORK | Age: 47
End: 2022-11-30

## 2022-11-30 NOTE — PROGRESS NOTES
This RN contact hospitalist Dr. Rich Beltran for orders. Per MD MAP goal change to 50 with no titrations upward or increase of support if patient goes below. No medication increases or ventilator setting changes are to be made. Ok to discontinue scheduled medications.

## 2022-11-30 NOTE — PROGRESS NOTES
Asystole noted on monitor @ 1933. Dr. Cynthia Ling notified. All drips stopped. [facility] office and [employer].

## 2022-11-30 NOTE — DEATH NOTES
Death Pronouncement Note  Patient's Name: Clifton Greer   Patient's YOB: 1975  MRN Number: [id number]    Admitting Provider: Tre Masterson DO  Attending Provider: Omar York MD    Patient was examined and the following were absent: Pulses, Blood Pressure, and Respiratory effort    I declared the patient dead on 11/29/2022 at 1933    Preliminary Cause of Death: cardiac arrest    Electronically signed by Tre Masterson DO on 11/29/22 at 7:40 PM EST

## 2022-11-30 NOTE — DISCHARGE SUMMARY
Death Summary         Patient:  Erci Melgar      Unit/Bed:N9Q-8321/2111-01    YOB: 1975    MRN: [id number]       Acct: [de-identified]     PCP: FARHAN Edwards CNP    Date of Admission: 11/19/2022      Date and time of death:   Brain-death  14:02  on 11/29/2022    Cessation of cardiac activity (cardiac death) 19:33 on 11/29/2022        Cause of death/primary discharge diagnosis :    Cardiac arrest secondary to severe acute respiratory failure/ARDS likely secondary to influenza A  Bilateral parenchymal pulmonary disease (differential diagnosis viral pneumonia, pulmonary edema, aspiration pneumonia)  Severe anoxic brain injury  Myoclonic jerking seizures  Hyperglycemia          Hospital course: Eric Melgar is a 52 y.o.  female  who was hospitalized for cardiac arrest.  She received CPR in the ED,  ROSC achieved approximately 10 minutes after resuscitation. Initial rhythm PEA. CTA of the chest obtained showed no pulmonary embolism. Bilateral airspace disease noted. Patient initial call was flulike symptoms resulted in acute respiratory failure which is the most likely reason for the cardiac arrest.  Echocardiogram showed normal LV systolic function. Hypothermia protocol initiated, patient evaluated by intensivist, cardiology te am and neurology team.  She received supportive care, Tamiflu, broad-spectrum antibiotics respiratory care protocol and vasopressor for hypotension. Patient to continue high vent setting to maintain reasonable oxygen saturation. Patient had seizure-like activity for which he empirically treated and antiepileptic agent. Initial CT scan of the head showed no acute abnormalities. Patient subsequently weaned off sedation no improvement of mental status despite being off sedation. MRI of the brain indicate severe hypoxic brain injury.   Palliative care team consulted and coordinated care with family including patient's sister, patient's son who was inmate at correctional facility. Patient had nonsurvivable diagnosis, medical DNR ordered by collaboration of critical care care team attending Dr. Sallie Ardon and Dr. Marcela Dial. Brain death examination initiated but apnea test was unable to be completed secondary to severe hypoxemia. Brain nuclear scan showed absent perfusion to the brain. She was declared brain dead on the above-mentioned date and time. .  Ethic committee consulted and cardiac activity ceased on the above-mentioned date and time. This is a summary of the patient hospital stay, for details please review H&P, consultant note, daily progress note, diagnostic and laboratory data associated with this encounter.      Electronically signed by Omar York MD on 11/30/2022 at 8:10 AM

## 2022-11-30 NOTE — TELEPHONE ENCOUNTER
Spoke with patient's sister Maria Teresa Stevenson, she asked me to call Meka Martins - other family member- who will be helping with arrangements, called Meka Martins and Left message.

## 2022-12-06 ENCOUNTER — TELEPHONE (OUTPATIENT)
Dept: SOCIAL WORK | Age: 47
End: 2022-12-06

## 2022-12-06 NOTE — TELEPHONE ENCOUNTER
Shelli reports that she doesn't known how to make arrangements and has no  money to do so. Asked if I would call other family members.

## 2022-12-07 ENCOUNTER — TELEPHONE (OUTPATIENT)
Dept: SOCIAL WORK | Age: 47
End: 2022-12-07

## 2022-12-07 NOTE — TELEPHONE ENCOUNTER
Called Family member- Edilma Huynh, she placed other family members on 3 ways call- Afsaneh Ordoñez and another cousin Isabel Willis- no one has made arrangements for patient, they will call other family member and call back to this . They believe Edith Vega stated that she would made arrangement -    has tried to call Karen Rogers  twice and unable to leave message.

## 2022-12-09 ENCOUNTER — TELEPHONE (OUTPATIENT)
Dept: SOCIAL WORK | Age: 47
End: 2022-12-09

## 2023-01-11 ENCOUNTER — TELEPHONE (OUTPATIENT)
Dept: SOCIAL WORK | Age: 48
End: 2023-01-11

## 2023-01-11 NOTE — TELEPHONE ENCOUNTER
Percy Morales from  [facility] called and she has not been able to get Riaz Hicks- pt's son's rep to call her back, advised I would call him and ask him to reach out to melissa Loja to  pt's ashes.

## 2024-06-28 NOTE — PROGRESS NOTES
BRAIN DEATH EXAMINATION    Physicians Completing Exam: Cyndie Falcon MD  PCP: Altagracia Rubio APRN - CNP  Attending: Iza Garcia MD     Diagnosis: Cardiac arrest ([facility] [address] [I46.9]  Septicemia ([facility] [address] [A41.9]  Abnormal EKG [R94.31]  Aspiration pneumonia, unspecified aspiration pneumonia type, unspecified laterality, unspecified part of lung ([facility] [address] [J69.0]  Probable cause of coma: anoxic brain injury      1. Vital Statistics:   A. SBP greater than or equal to 100 mmHg: [x] Yes   [] No  B. Body temperature greater than or equal to 36.5 degrees Celsius:  [x] Yes   [] No  C. Toxicology results: Invalid input(s): Tiny Balbuena  D. Pentobarbital level: NA  E. Mydriatic drugs used:  [] Yes  [x] No  F. Neuromuscular blocking agents used:  [] Yes   [x] No  G. Any residual neuromuscular blockade:  [] Yes   [x] No    2. Cerebral responsivity:  A. Response to central painful stimulation at any three SUPRACLAVICULAR locations (i.e. supra-orbital pain, TMJ pressure, nares reflex, anterior axillary fold pressure, upper trapezius squeeze):  [] Yes   [x] No    3. Brainstem responsivity:  A. Pupillary reaction present:  [] Yes   [x] No  B. Pupil size dilated  1. Right pupil: 5 mm   2. Left pupil: 5 mm    C. Corneal reflexes present:  [] Yes   [x] No  D. Oculocephalic reflex present:  [] Yes   [x] No  E. Oculovestibular reflex present:  [] Yes   [x] No  F. Gag reflex present:  [] Yes   [x] No  G. Cough reflex present:  [] Yes   [x] No  H. Spontaneous breathing present:  [] Yes   [x] No    4. Apnea test:  Could not complete due to rapid desaturation/hypoxia, test aproted     5.  Results of ancillary tests if performed (document time of study result): Discussed with [facility] department the availability of brain perfusion scans, they are out of isotope for today will have some tomorrow      Critical Care time 40 minutes         Cyndie Falcon MD, 11/28/2022, 12:25 PM Medication: Atorvastatin 40mg passed protocol.   Last office visit date: 4/24/2024  Next appointment scheduled?: No   Number of refills given: 3

## 2024-09-27 NOTE — PROGRESS NOTES
Advance Care Planning   Healthcare Decision Maker:    Primary Decision Maker (Active): PO TORRES [facility] - [phone]   Physician Progress Note      PATIENT:               Peter Brumfield  CSN #:                  [id number]  :                       1975  ADMIT DATE:       2022 4:56 AM  DISCH DATE:  RESPONDING  PROVIDER #:        Kavya Hillman MD          QUERY TEXT:    Pt admitted with cardiac arrest.  Per EMS had flu-like symptoms, walked to   ambulance, then began vomiting and lost consciousness. Diagnosed with   Influenza pneumonia and aspiration pneumonia with ARDS. On arrival, WBC 13.9,   lactic acid 7.3, glucose 198, no temp documented prior to initiation of   hypothermia protocol. Procalcitonin done on  was 8.34. Shock documented   by pulmonary consult beginning . If possible, please document in the   progress notes and discharge summary if you are evaluating and /or treating   any of the following: The medical record reflects the following:  Risk Factors: influenza and aspiration pneumonia  Clinical Indicators: WBC 13.9, lactic acid 7.3, glucose 198, no temp   documented prior to initiation of hypothermia protocol. Procalcitonin done    was 8.34. Shock documented by pulmonary consult beginning . Treatment: Flagyl, Maxipime, Tamiflu, intubation and vent, Levophed    Thank you,  Leslie Quinones, RN, BSN, CCDS  [email or web address]  Options provided:  -- Sepsis present on admission  -- Sepsis developed subsequent to admission  -- Influenza pneumonia and aspiration pneumonia without sepsis  -- Other - I will add my own diagnosis  -- Disagree - Not applicable / Not valid  -- Disagree - Clinically unable to determine / Unknown  -- Refer to Clinical Documentation Reviewer    PROVIDER RESPONSE TEXT:    This patient has sepsis which was present on admission. Query created by: Doris Martinez on 2022 9:31 AM      Electronically signed by:   Kavya Hillman MD 2022 1:18 PM